# Patient Record
Sex: MALE | Race: WHITE | NOT HISPANIC OR LATINO | Employment: OTHER | URBAN - METROPOLITAN AREA
[De-identification: names, ages, dates, MRNs, and addresses within clinical notes are randomized per-mention and may not be internally consistent; named-entity substitution may affect disease eponyms.]

---

## 2018-02-14 ENCOUNTER — HOSPITAL ENCOUNTER (INPATIENT)
Facility: MEDICAL CENTER | Age: 58
LOS: 3 days | DRG: 175 | End: 2018-02-17
Attending: EMERGENCY MEDICINE | Admitting: INTERNAL MEDICINE
Payer: MEDICARE

## 2018-02-14 ENCOUNTER — RESOLUTE PROFESSIONAL BILLING HOSPITAL PROF FEE (OUTPATIENT)
Dept: HOSPITALIST | Facility: MEDICAL CENTER | Age: 58
End: 2018-02-14
Payer: MEDICARE

## 2018-02-14 ENCOUNTER — HOSPITAL ENCOUNTER (OUTPATIENT)
Dept: RADIOLOGY | Facility: MEDICAL CENTER | Age: 58
End: 2018-02-14

## 2018-02-14 DIAGNOSIS — E11.65 TYPE 2 DIABETES MELLITUS WITH HYPERGLYCEMIA, WITHOUT LONG-TERM CURRENT USE OF INSULIN (HCC): ICD-10-CM

## 2018-02-14 DIAGNOSIS — R79.89 ELEVATED TROPONIN: ICD-10-CM

## 2018-02-14 DIAGNOSIS — I26.09 OTHER ACUTE PULMONARY EMBOLISM WITH ACUTE COR PULMONALE (HCC): ICD-10-CM

## 2018-02-14 PROBLEM — I26.99 BILATERAL PULMONARY EMBOLISM (HCC): Status: ACTIVE | Noted: 2018-02-14

## 2018-02-14 PROBLEM — J96.01 ACUTE RESPIRATORY FAILURE WITH HYPOXIA (HCC): Status: ACTIVE | Noted: 2018-02-14

## 2018-02-14 LAB
ALBUMIN SERPL BCP-MCNC: 3.6 G/DL (ref 3.2–4.9)
ALBUMIN/GLOB SERPL: 1.2 G/DL
ALP SERPL-CCNC: 75 U/L (ref 30–99)
ALT SERPL-CCNC: 62 U/L (ref 2–50)
ANION GAP SERPL CALC-SCNC: 11 MMOL/L (ref 0–11.9)
APTT PPP: 53.9 SEC (ref 24.7–36)
AST SERPL-CCNC: 59 U/L (ref 12–45)
BASOPHILS # BLD AUTO: 0 % (ref 0–1.8)
BASOPHILS # BLD: 0 K/UL (ref 0–0.12)
BILIRUB SERPL-MCNC: 0.6 MG/DL (ref 0.1–1.5)
BNP SERPL-MCNC: 327 PG/ML (ref 0–100)
BUN SERPL-MCNC: 15 MG/DL (ref 8–22)
CALCIUM SERPL-MCNC: 8.4 MG/DL (ref 8.5–10.5)
CHLORIDE SERPL-SCNC: 107 MMOL/L (ref 96–112)
CO2 SERPL-SCNC: 19 MMOL/L (ref 20–33)
CREAT SERPL-MCNC: 0.74 MG/DL (ref 0.5–1.4)
EKG IMPRESSION: NORMAL
EOSINOPHIL # BLD AUTO: 0 K/UL (ref 0–0.51)
EOSINOPHIL NFR BLD: 0 % (ref 0–6.9)
ERYTHROCYTE [DISTWIDTH] IN BLOOD BY AUTOMATED COUNT: 43.5 FL (ref 35.9–50)
EST. AVERAGE GLUCOSE BLD GHB EST-MCNC: 309 MG/DL
FLUAV RNA SPEC QL NAA+PROBE: NEGATIVE
FLUBV RNA SPEC QL NAA+PROBE: NEGATIVE
GLOBULIN SER CALC-MCNC: 2.9 G/DL (ref 1.9–3.5)
GLUCOSE BLD-MCNC: 205 MG/DL (ref 65–99)
GLUCOSE SERPL-MCNC: 284 MG/DL (ref 65–99)
HBA1C MFR BLD: 12.4 % (ref 0–5.6)
HCT VFR BLD AUTO: 42.6 % (ref 42–52)
HGB BLD-MCNC: 14.2 G/DL (ref 14–18)
INR PPP: 1.22 (ref 0.87–1.13)
LIPASE SERPL-CCNC: 99 U/L (ref 11–82)
LYMPHOCYTES # BLD AUTO: 2.5 K/UL (ref 1–4.8)
LYMPHOCYTES NFR BLD: 24.8 % (ref 22–41)
MANUAL DIFF BLD: NORMAL
MCH RBC QN AUTO: 30.3 PG (ref 27–33)
MCHC RBC AUTO-ENTMCNC: 33.3 G/DL (ref 33.7–35.3)
MCV RBC AUTO: 90.8 FL (ref 81.4–97.8)
MONOCYTES # BLD AUTO: 0.44 K/UL (ref 0–0.85)
MONOCYTES NFR BLD AUTO: 4.4 % (ref 0–13.4)
MORPHOLOGY BLD-IMP: NORMAL
NEUTROPHILS # BLD AUTO: 7.15 K/UL (ref 1.82–7.42)
NEUTROPHILS NFR BLD: 69.9 % (ref 44–72)
NEUTS BAND NFR BLD MANUAL: 0.9 % (ref 0–10)
NRBC # BLD AUTO: 0 K/UL
NRBC BLD-RTO: 0 /100 WBC
PLATELET # BLD AUTO: 142 K/UL (ref 164–446)
PLATELET BLD QL SMEAR: NORMAL
PMV BLD AUTO: 9.9 FL (ref 9–12.9)
POTASSIUM SERPL-SCNC: 4.5 MMOL/L (ref 3.6–5.5)
PROT SERPL-MCNC: 6.5 G/DL (ref 6–8.2)
PROTHROMBIN TIME: 15.1 SEC (ref 12–14.6)
RBC # BLD AUTO: 4.69 M/UL (ref 4.7–6.1)
RBC BLD AUTO: PRESENT
SODIUM SERPL-SCNC: 137 MMOL/L (ref 135–145)
TROPONIN I SERPL-MCNC: 0.41 NG/ML (ref 0–0.04)
VARIANT LYMPHS BLD QL SMEAR: NORMAL
WBC # BLD AUTO: 10.1 K/UL (ref 4.8–10.8)

## 2018-02-14 PROCEDURE — 93005 ELECTROCARDIOGRAM TRACING: CPT | Performed by: EMERGENCY MEDICINE

## 2018-02-14 PROCEDURE — 83690 ASSAY OF LIPASE: CPT

## 2018-02-14 PROCEDURE — 83880 ASSAY OF NATRIURETIC PEPTIDE: CPT

## 2018-02-14 PROCEDURE — 83036 HEMOGLOBIN GLYCOSYLATED A1C: CPT

## 2018-02-14 PROCEDURE — 85610 PROTHROMBIN TIME: CPT

## 2018-02-14 PROCEDURE — 85007 BL SMEAR W/DIFF WBC COUNT: CPT

## 2018-02-14 PROCEDURE — 96365 THER/PROPH/DIAG IV INF INIT: CPT

## 2018-02-14 PROCEDURE — 82962 GLUCOSE BLOOD TEST: CPT

## 2018-02-14 PROCEDURE — 96375 TX/PRO/DX INJ NEW DRUG ADDON: CPT

## 2018-02-14 PROCEDURE — 84484 ASSAY OF TROPONIN QUANT: CPT

## 2018-02-14 PROCEDURE — 99291 CRITICAL CARE FIRST HOUR: CPT

## 2018-02-14 PROCEDURE — 37212 THROMBOLYTIC VENOUS THERAPY: CPT

## 2018-02-14 PROCEDURE — 770022 HCHG ROOM/CARE - ICU (200)

## 2018-02-14 PROCEDURE — 302128 INFUSION PUMP: Performed by: EMERGENCY MEDICINE

## 2018-02-14 PROCEDURE — 87502 INFLUENZA DNA AMP PROBE: CPT

## 2018-02-14 PROCEDURE — 93971 EXTREMITY STUDY: CPT

## 2018-02-14 PROCEDURE — 85730 THROMBOPLASTIN TIME PARTIAL: CPT

## 2018-02-14 PROCEDURE — 99223 1ST HOSP IP/OBS HIGH 75: CPT | Mod: AI | Performed by: INTERNAL MEDICINE

## 2018-02-14 PROCEDURE — 80053 COMPREHEN METABOLIC PANEL: CPT

## 2018-02-14 PROCEDURE — 700111 HCHG RX REV CODE 636 W/ 250 OVERRIDE (IP): Mod: JG | Performed by: INTERNAL MEDICINE

## 2018-02-14 PROCEDURE — 700102 HCHG RX REV CODE 250 W/ 637 OVERRIDE(OP): Performed by: INTERNAL MEDICINE

## 2018-02-14 PROCEDURE — 85027 COMPLETE CBC AUTOMATED: CPT

## 2018-02-14 RX ORDER — DEXTROSE MONOHYDRATE 25 G/50ML
25 INJECTION, SOLUTION INTRAVENOUS
Status: DISCONTINUED | OUTPATIENT
Start: 2018-02-14 | End: 2018-02-17 | Stop reason: HOSPADM

## 2018-02-14 RX ORDER — ONDANSETRON 2 MG/ML
4 INJECTION INTRAMUSCULAR; INTRAVENOUS EVERY 4 HOURS PRN
Status: DISCONTINUED | OUTPATIENT
Start: 2018-02-14 | End: 2018-02-17 | Stop reason: HOSPADM

## 2018-02-14 RX ORDER — ONDANSETRON 4 MG/1
4 TABLET, ORALLY DISINTEGRATING ORAL EVERY 4 HOURS PRN
Status: DISCONTINUED | OUTPATIENT
Start: 2018-02-14 | End: 2018-02-17 | Stop reason: HOSPADM

## 2018-02-14 RX ORDER — AMOXICILLIN 250 MG
2 CAPSULE ORAL 2 TIMES DAILY
Status: DISCONTINUED | OUTPATIENT
Start: 2018-02-14 | End: 2018-02-17 | Stop reason: HOSPADM

## 2018-02-14 RX ORDER — HEPARIN SODIUM 1000 [USP'U]/ML
3200 INJECTION, SOLUTION INTRAVENOUS; SUBCUTANEOUS PRN
Status: DISCONTINUED | OUTPATIENT
Start: 2018-02-14 | End: 2018-02-14 | Stop reason: ALTCHOICE

## 2018-02-14 RX ORDER — PROMETHAZINE HYDROCHLORIDE 25 MG/1
12.5-25 SUPPOSITORY RECTAL EVERY 4 HOURS PRN
Status: DISCONTINUED | OUTPATIENT
Start: 2018-02-14 | End: 2018-02-17 | Stop reason: HOSPADM

## 2018-02-14 RX ORDER — BISACODYL 10 MG
10 SUPPOSITORY, RECTAL RECTAL
Status: DISCONTINUED | OUTPATIENT
Start: 2018-02-14 | End: 2018-02-17 | Stop reason: HOSPADM

## 2018-02-14 RX ORDER — POLYETHYLENE GLYCOL 3350 17 G/17G
1 POWDER, FOR SOLUTION ORAL
Status: DISCONTINUED | OUTPATIENT
Start: 2018-02-14 | End: 2018-02-17 | Stop reason: HOSPADM

## 2018-02-14 RX ORDER — SODIUM CHLORIDE 9 MG/ML
1000 INJECTION, SOLUTION INTRAVENOUS ONCE
Status: ACTIVE | OUTPATIENT
Start: 2018-02-14 | End: 2018-02-15

## 2018-02-14 RX ORDER — LABETALOL HYDROCHLORIDE 5 MG/ML
10 INJECTION, SOLUTION INTRAVENOUS EVERY 4 HOURS PRN
Status: DISCONTINUED | OUTPATIENT
Start: 2018-02-14 | End: 2018-02-15

## 2018-02-14 RX ORDER — ENALAPRILAT 1.25 MG/ML
1.25 INJECTION INTRAVENOUS EVERY 6 HOURS PRN
Status: DISCONTINUED | OUTPATIENT
Start: 2018-02-14 | End: 2018-02-15

## 2018-02-14 RX ORDER — SODIUM CHLORIDE 9 MG/ML
INJECTION, SOLUTION INTRAVENOUS ONCE
Status: ACTIVE | OUTPATIENT
Start: 2018-02-14 | End: 2018-02-15

## 2018-02-14 RX ORDER — PROMETHAZINE HYDROCHLORIDE 25 MG/1
12.5-25 TABLET ORAL EVERY 4 HOURS PRN
Status: DISCONTINUED | OUTPATIENT
Start: 2018-02-14 | End: 2018-02-17 | Stop reason: HOSPADM

## 2018-02-14 RX ORDER — ACETAMINOPHEN 325 MG/1
650 TABLET ORAL EVERY 6 HOURS PRN
Status: DISCONTINUED | OUTPATIENT
Start: 2018-02-14 | End: 2018-02-17 | Stop reason: HOSPADM

## 2018-02-14 RX ORDER — HEPARIN SODIUM 1000 [USP'U]/ML
4000 INJECTION, SOLUTION INTRAVENOUS; SUBCUTANEOUS PRN
Status: DISCONTINUED | OUTPATIENT
Start: 2018-02-14 | End: 2018-02-17 | Stop reason: HOSPADM

## 2018-02-14 RX ADMIN — INSULIN HUMAN 2 UNITS: 100 INJECTION, SOLUTION PARENTERAL at 20:53

## 2018-02-14 RX ADMIN — ALTEPLASE 10 MG: KIT at 16:41

## 2018-02-14 RX ADMIN — HEPARIN SODIUM 25000 UNITS: 5000 INJECTION, SOLUTION INTRAVENOUS at 19:07

## 2018-02-14 RX ADMIN — HEPARIN SODIUM 4000 UNITS: 1000 INJECTION, SOLUTION INTRAVENOUS; SUBCUTANEOUS at 19:07

## 2018-02-14 RX ADMIN — ALTEPLASE 40 MG: KIT at 16:42

## 2018-02-14 ASSESSMENT — LIFESTYLE VARIABLES
EVER HAD A DRINK FIRST THING IN THE MORNING TO STEADY YOUR NERVES TO GET RID OF A HANGOVER: NO
HAVE YOU EVER FELT YOU SHOULD CUT DOWN ON YOUR DRINKING: NO
EVER FELT BAD OR GUILTY ABOUT YOUR DRINKING: NO
TOTAL SCORE: 0
HOW MANY TIMES IN THE PAST YEAR HAVE YOU HAD 5 OR MORE DRINKS IN A DAY: 2
HAVE PEOPLE ANNOYED YOU BY CRITICIZING YOUR DRINKING: NO
EVER_SMOKED: NEVER
EVER_SMOKED: YES
ALCOHOL_USE: YES
TOTAL SCORE: 0
TOTAL SCORE: 0
CONSUMPTION TOTAL: POSITIVE
AVERAGE NUMBER OF DAYS PER WEEK YOU HAVE A DRINK CONTAINING ALCOHOL: 2
ON A TYPICAL DAY WHEN YOU DRINK ALCOHOL HOW MANY DRINKS DO YOU HAVE: 1

## 2018-02-14 ASSESSMENT — ENCOUNTER SYMPTOMS
CHILLS: 0
SHORTNESS OF BREATH: 1
FEVER: 0
LOSS OF CONSCIOUSNESS: 1

## 2018-02-14 ASSESSMENT — COPD QUESTIONNAIRES
DURING THE PAST 4 WEEKS HOW MUCH DID YOU FEEL SHORT OF BREATH: NONE/LITTLE OF THE TIME
DO YOU EVER COUGH UP ANY MUCUS OR PHLEGM?: NO/ONLY WITH OCCASIONAL COLDS OR INFECTIONS
HAVE YOU SMOKED AT LEAST 100 CIGARETTES IN YOUR ENTIRE LIFE: NO/DON'T KNOW
COPD SCREENING SCORE: 1

## 2018-02-14 ASSESSMENT — PATIENT HEALTH QUESTIONNAIRE - PHQ9
SUM OF ALL RESPONSES TO PHQ9 QUESTIONS 1 AND 2: 0
SUM OF ALL RESPONSES TO PHQ QUESTIONS 1-9: 0
1. LITTLE INTEREST OR PLEASURE IN DOING THINGS: NOT AT ALL
2. FEELING DOWN, DEPRESSED, IRRITABLE, OR HOPELESS: NOT AT ALL

## 2018-02-14 ASSESSMENT — PAIN SCALES - GENERAL
PAINLEVEL_OUTOF10: 0

## 2018-02-14 NOTE — ED TRIAGE NOTES
58 y/o male bib air ambulance from [facility] in [facility] for evaluation of bilateral pulmonary embolism found on CT today. Pt reports he was not feeling well on Sunday, he felt sob last night and awoke this morning feeling worse, he was heading to the hospital with his wife and had a syncopal episode, EMS was called to bring pt to the ED in [place]. Pt on a heparin drip upon arrival to ED.

## 2018-02-14 NOTE — CONSULTS
Pulmonary & Critical Care Consult Note    DATE OF CONSULTATION:  2/14/2018     REFERRING PHYSICIAN:  Som Gudino M.D     CONSULTANT:  Douglas Barth DO     REASON FOR CONSULTATION: Submassive pulmonary embolus, syncope     HISTORY OF PRESENT ILLNESS: Mr. Small is a 57-year-old male with a past medical history of diabetes, ischemic CVA 3.5 years ago, and heart failure (now resolved per patient)  Patient [facility] [place] for evaluation of bilateral PEs. The patient is noted pain swelling and redness to his left leg for approximately 2 weeks, and yesterday began having shortness of breath. This morning he awoke feeling more short of breath and presented to the hospital after a syncopal episode. A CT scan of the chest was performed which demonstrated bilateral pulmonary emboli without abnormal RV:LV. He was started on heparin infusion and transferred here for subspecialty care. In our emergency department the patient was found to have an elevated troponin at 0.41, elevated BNP at 327, PTT was noted at 53.9 (on heparin drip), he is requiring 3 L oxygen via nasal cannula and has been hemodynamically stable.    He denies any melena, hematochezia, hematuria, no recent surgeries, no ischemic CVA within the last 3 months, no known malignant intracranial neoplasm, no known structural intracranial cerebrovascular disease, and no recent closed head injury.    He denies any trauma, hormone replacement therapy, history of blood clots, recent travel, family history of venous thromboembolic disease or known malignancy.     PAST MEDICAL HISTORY:  Past Medical History:   Diagnosis Date   • CVA (cerebral vascular accident) (CMS-[facility])     left sided weakness   • DM II (diabetes mellitus, type II), controlled (CMS-[facility])    • TIA (transient ischemic attack)         PAST SURGICAL HISTORY:    Past Surgical History:   Procedure Laterality Date   • TONSILLECTOMY          ALLERGIES:  Patient has no  "known allergies.     MEDICATIONS PRIOR TO ADMISSION:  none  No current facility-administered medications on file prior to encounter.      No current outpatient prescriptions on file prior to encounter.       SOCIAL HISTORY:   Social History     Social History   • Marital status:      Spouse name: N/A   • Number of children: N/A   • Years of education: N/A     Occupational History   • Not on file.     Social History Main Topics   • Smoking status: Never Smoker   • Smokeless tobacco: Never Used   • Alcohol use Yes      Comment: occ   • Drug use: No   • Sexual activity: Not on file     Other Topics Concern   • Not on file     Social History Narrative   • No narrative on file       FAMILY HISTORY:  History reviewed. No pertinent family history.     REVIEW OF SYSTEMS:   Constitutional: No fever, no chills,  Respiratory: No cough, no hemoptysis, +dyspnea  Cardiovascular: No chest pain, no palpitations, no orthopnea, no PND, no lower extremity swelling  GI: No nausea, no vomiting, no abdominal pain, no diarrhea, no constipation, no hematochezia, no melena  : no dysuria, no frequency, no urgency  Endocrine: No polyuria, no polydipsia, no hair loss  Hematology: No easy bruising, no bleeding  Musculoskeletal: No joint swelling, no joint erythema, no arthralgia, no myalgias, +LE swelling  Neuro: No seizures, no numbness, no tingling sensation, no extremity weakness, no change in vision.  Psychiatry: no depression, no suicidal ideation     PHYSICAL EXAMINATION:  /82   Pulse (!) 108   Temp 36.4 °C (97.6 °F)   Resp (!) 22   Ht 1.778 m (5' 10\")   Wt 95.3 kg (210 lb)   SpO2 96%   BMI 30.13 kg/m²   GENERAL: Well-nourished, well-developed, age-appropriate appearing male, in minimal distress  HEENT: Normocephalic, atraumatic, PERRL, EOMI, external ears normal, external nose normal, nasal cannula in place  NECK: Supple, no JVD, trachea midline  PULM: Clear to auscultation bilaterally, no wheeze  CVS: Mild " tachycardia, regular rhythm  ABDOMEN: Soft, nontender, bowel sounds, no rebound, no guarding  EXTREMITIES: Left lower extremity erythematous, edematous, tender to palpation  SKIN: Warm,  and dry  NEURO: Alert and oriented ×4 left upper extremity weakness    LABORATORY DATA:    Lab Results   Component Value Date/Time    WBC 10.1 02/14/2018 12:45 PM    RBC 4.69 (L) 02/14/2018 12:45 PM    HEMOGLOBIN 14.2 02/14/2018 12:45 PM    HEMATOCRIT 42.6 02/14/2018 12:45 PM    MCV 90.8 02/14/2018 12:45 PM    MCH 30.3 02/14/2018 12:45 PM    MCHC 33.3 (L) 02/14/2018 12:45 PM    MPV 9.9 02/14/2018 12:45 PM    NEUTSPOLYS 69.90 02/14/2018 12:45 PM    LYMPHOCYTES 24.80 02/14/2018 12:45 PM    MONOCYTES 4.40 02/14/2018 12:45 PM    EOSINOPHILS 0.00 02/14/2018 12:45 PM    BASOPHILS 0.00 02/14/2018 12:45 PM      Lab Results   Component Value Date/Time    SODIUM 137 02/14/2018 12:45 PM    POTASSIUM 4.5 02/14/2018 12:45 PM    CHLORIDE 107 02/14/2018 12:45 PM    CO2 19 (L) 02/14/2018 12:45 PM    GLUCOSE 284 (H) 02/14/2018 12:45 PM    BUN 15 02/14/2018 12:45 PM    CREATININE 0.74 02/14/2018 12:45 PM      Lab Results   Component Value Date/Time    PROTHROMBTM 15.1 (H) 02/14/2018 12:45 PM    INR 1.22 (H) 02/14/2018 12:45 PM         IMAGING:   CXR (personally reviewed)  LE VENOUS DUPLEX (Specify in Comments Left, Right Or Bilateral)         OUTSIDE IMAGES-CT CHEST   Final Result      ECHOCARDIOGRAM COMP W/O CONT    (Results Pending)     ASSESSMENT/RECS:  Submassive pulmonary embolus   - With evidence of myocardial necrosis: Troponin 0.41, . RV:LV ~1   - TPA offered to the patient and wife: Risks and benefits explained including 2% risk of cranial hemorrhage, 6% risk of hemorrhage requiring transfusion. Less long-term pulmonary hypertension, decreased recurrence of PE, decreased death and hemodynamic instability.   - They have elected to proceed with thrombolysis   - ICU orders have been placed   - neurochecks    Acute hypoxic respiratory  failure   - Secondary to pulmonary embolus   - RT/O2 Protocols   - Titrate supplemental FiO2 to maintain SpO2 >88%    Diabetes   - With hyperglycemia   - SSI, accuchecks    Hypertension   - Goal SBP <180 mmHg   - PRN's available    Prophylaxis: heparin    Patient is critically ill at this time.  I have spent 60 minutes examining this patient, all lab data, x-ray, and discussion with RN, RT, ED physician. Critical care time: 60 min. No time overlap. Procedures not included in time. Thank you for asking me to consult on the patient.  I appreciate the opportunity to assist in their care and will follow along closely with you.    Douglas Barth, DO  Critical Care Medicine    This dictation was created using voice recognition software. The accuracy of the dictation is limited to the abilities of the software. Errors of grammar and possibly content are to be expected.

## 2018-02-14 NOTE — ED NOTES
"Med rec updated and complete  Allergie reviewed  Pt states \"No prescription medications, OTC'S, or vitamins\".  Pt states \"No antibiotics in the last 30 days\".    "

## 2018-02-14 NOTE — ED PROVIDER NOTES
ED Provider Note    Scribed for Som Gudino M.D. by Afshan Azevedo. 2/14/2018, 12:14 PM.    Primary care provider: PCP, pt states none  Means of arrival: Med Flight  History obtained from: Patient  History limited by: None    CHIEF COMPLAINT  Chief Complaint   Patient presents with   • Pulmonary Embolism   • Syncope       HPI  Ryan Small is a 57 y.o. male who presents to the Emergency Department via Med Flight from [facility] [place], operated by [facility] in [facility] for evaluation of known bilateral PE, diagnosed via CT. Patient endorses shortness of breath, onset yesterday. His SOB worsened this morning with mild chest pain and a syncopal episode. 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 No complaints of fevers or chills. No new weakness to his extremities, however patient has felt slightly weaker in his left upper and lower extremities s/p CVA 2 years ago. Patient denies any fevers, chills, nausea, vomiting, is here for continued treatment and therapy.      REVIEW OF SYSTEMS  Review of Systems   Constitutional: Negative for chills and fever.   Respiratory: Positive for shortness of breath.    Cardiovascular: Positive for chest pain.   Musculoskeletal:        LE swelling   Neurological: Positive for loss of consciousness.        No new weakness   All other systems reviewed and are negative.  C.     PAST MEDICAL HISTORY   has a past medical history of CVA (cerebral vascular accident) (CMS-[facility]); DM II (diabetes mellitus, type II), controlled (CMS-[facility]); and TIA (transient ischemic attack).    SURGICAL HISTORY   has a past surgical history that includes tonsillectomy.    SOCIAL HISTORY  Social History   Substance Use Topics   • Smoking status: Never Smoker   • Smokeless tobacco: Never Used   • Alcohol use Yes      Comment: occ      History   Drug Use No   Works at a restaurant.     FAMILY HISTORY  History reviewed. No  pertinent family history.    CURRENT MEDICATIONS    Current Facility-Administered Medications:   •  NS infusion 1,000 mL, 1,000 mL, Intravenous, Once, Som Gudino M.D.  •  senna-docusate (PERICOLACE or SENOKOT S) 8.6-50 MG per tablet 2 Tab, 2 Tab, Oral, BID **AND** polyethylene glycol/lytes (MIRALAX) PACKET 1 Packet, 1 Packet, Oral, QDAY PRN **AND** magnesium hydroxide (MILK OF MAGNESIA) suspension 30 mL, 30 mL, Oral, QDAY PRN **AND** bisacodyl (DULCOLAX) suppository 10 mg, 10 mg, Rectal, QDAY PRN, Ki Chan M.D.  •  Respiratory Care per Protocol, , Nebulization, Continuous RT, Ki Chan M.D.  •  labetalol (NORMODYNE,TRANDATE) injection 10 mg, 10 mg, Intravenous, Q4HRS PRN, Ki Chan M.D.  •  enalaprilat (VASOTEC) injection 1.25 mg, 1.25 mg, Intravenous, Q6HRS PRN, Ki Chan M.D.  •  ondansetron (ZOFRAN) syringe/vial injection 4 mg, 4 mg, Intravenous, Q4HRS PRN, Ki Chan M.D.  •  ondansetron (ZOFRAN ODT) dispertab 4 mg, 4 mg, Oral, Q4HRS PRN, Ki Chan M.D.  •  promethazine (PHENERGAN) tablet 12.5-25 mg, 12.5-25 mg, Oral, Q4HRS PRN, Ki Chan M.D.  •  promethazine (PHENERGAN) suppository 12.5-25 mg, 12.5-25 mg, Rectal, Q4HRS PRN, Ki Chan M.D.  •  prochlorperazine (COMPAZINE) injection 5-10 mg, 5-10 mg, Intravenous, Q4HRS PRN, Ki Chan M.D.  •  acetaminophen (TYLENOL) tablet 650 mg, 650 mg, Oral, Q6HRS PRN, Ki Chan M.D.  •  insulin regular (HUMULIN R) injection 1-6 Units, 1-6 Units, Subcutaneous, 4X/DAY ACHS **AND** Accu-Chek ACHS, , , Q AC AND BEDTIME(S) **AND** NOTIFY MD and PharmD, , , Once **AND** glucose 4 g chewable tablet 16 g, 16 g, Oral, Q15 MIN PRN **AND** dextrose 50% (D50W) injection 25 mL, 25 mL, Intravenous, Q15 MIN PRN, Ki Chan M.D.  •  [COMPLETED] alteplase (ACTIVASE) SOLR 10 mg, 10 mg, Intravenous, Once, 10 mg at 02/14/18 1641 **FOLLOWED BY** [COMPLETED] alteplase (ACTIVASE) SOLR 40 mg, 40 mg, Intravenous, Once, 40 mg at  02/14/18 1642 **FOLLOWED BY** NS infusion, , Intravenous, Once, Douglas Barth Jr. D.O.  •  heparin injection 4,000 Units, 4,000 Units, Intravenous, PRN, 4,000 Units at 02/14/18 1907 **AND** heparin infusion 25,000 units in 500 ml 0.45% nacl, , Intravenous, Continuous, Last Rate: 20 mL/hr at 02/14/18 1937, 1,000 Units/hr at 02/14/18 1937 **AND** Action is required: Protocol 209 Heparin Post Fibrinolytic has been implemented, , , Once **AND** Protocol 209 Heparin Post Fibrinolytic Discontinue Enoxaparin (Lovenox), Dabigatran (Pradaxa), Rivaroxaban (Xarelto), Apixaban (Eliquis), Edoxaban (Savaysa, Lixiana), and Fondaparinux (Arixtra) and Argatroban prior to heparin administration, , , CONTINUOUS **AND** APTT, , , Q6H(S) **AND** [START ON 2/15/2018] APTT, , , TOMORROW AM (0300) **AND** RN to place APTT Orders IF, , , CONTINUOUS **AND** Protocol 209 HEPARIN LOADING DOSE - 1000 UNITS/ML GOAL APPROXIMATELY 1000 UNITS/ML, , , CONTINUOUS **AND** Protocol 209 INITIAL HEPARIN INFUSION - 25,000 UNITS  ML OF 0.45% NaCl = 50 UNITS/ML GOAL APPROXIMATELY 12 UNITS/KG/HOUR, , , CONTINUOUS **AND** Protocol 209 HEPARIN ADJUSTMENT RELOAD OR HOLD / RATE CHANGE, , , CONTINUOUS, Douglas Barth Jr., D.O.      ALLERGIES  No Known Allergies    PHYSICAL EXAM  VITAL SIGNS: /82   Pulse (!) 108   Temp 36.4 °C (97.6 °F)   Resp (!) 22   Wt 95.3 kg (210 lb)   SpO2 96%     Constitutional: Well developed, Well nourished, No acute distress, Non-toxic appearance.   HENT: Normocephalic, Atraumatic, Bilateral external ears normal, oropharynx moist, No oral exudates, Nose normal.   Eyes: Pupils are equal round and react to light, extraocular motions are intact, conjunctiva is normal, there are no signs of exudate.   Neck: Supple, no meningeal signs.  Lymphatic: No lymphadenopathy noted.   Cardiovascular: Tachycardic, regular rhythm without murmurs gallops or rubs.   Thorax & Lungs: No respiratory distress. Breathing comfortably. Lungs  are clear to auscultation bilaterally, there are no wheezes no rales. Chest wall is nontender.  Abdomen: Soft, nontender, nondistended. Bowel sounds are present.   Skin: Warm, Dry, No erythema,   Back: No tenderness, No CVA tenderness.  Musculoskeletal: 2+ pitting edema to LLE, no edema to RLE. Tenderness to calf of LLE.   Neurologic: Alert & oriented x 3, Moving all extremities. 4/5 Strength to LUE, LLE. 5/5 strength to RUE, RLE.   Psychiatric: Affect normal, Judgment normal, Mood normal.       LABS  Results for orders placed or performed during the hospital encounter of 02/14/18   CBC with Differential   Result Value Ref Range    WBC 10.1 4.8 - 10.8 K/uL    RBC 4.69 (L) 4.70 - 6.10 M/uL    Hemoglobin 14.2 14.0 - 18.0 g/dL    Hematocrit 42.6 42.0 - 52.0 %    MCV 90.8 81.4 - 97.8 fL    MCH 30.3 27.0 - 33.0 pg    MCHC 33.3 (L) 33.7 - 35.3 g/dL    RDW 43.5 35.9 - 50.0 fL    Platelet Count 142 (L) 164 - 446 K/uL    MPV 9.9 9.0 - 12.9 fL    Neutrophils-Polys 69.90 44.00 - 72.00 %    Lymphocytes 24.80 22.00 - 41.00 %    Monocytes 4.40 0.00 - 13.40 %    Eosinophils 0.00 0.00 - 6.90 %    Basophils 0.00 0.00 - 1.80 %    Nucleated RBC 0.00 /100 WBC    Neutrophils (Absolute) 7.15 1.82 - 7.42 K/uL    Lymphs (Absolute) 2.50 1.00 - 4.80 K/uL    Monos (Absolute) 0.44 0.00 - 0.85 K/uL    Eos (Absolute) 0.00 0.00 - 0.51 K/uL    Baso (Absolute) 0.00 0.00 - 0.12 K/uL    NRBC (Absolute) 0.00 K/uL   Complete Metabolic Panel (CMP)   Result Value Ref Range    Sodium 137 135 - 145 mmol/L    Potassium 4.5 3.6 - 5.5 mmol/L    Chloride 107 96 - 112 mmol/L    Co2 19 (L) 20 - 33 mmol/L    Anion Gap 11.0 0.0 - 11.9    Glucose 284 (H) 65 - 99 mg/dL    Bun 15 8 - 22 mg/dL    Creatinine 0.74 0.50 - 1.40 mg/dL    Calcium 8.4 (L) 8.5 - 10.5 mg/dL    AST(SGOT) 59 (H) 12 - 45 U/L    ALT(SGPT) 62 (H) 2 - 50 U/L    Alkaline Phosphatase 75 30 - 99 U/L    Total Bilirubin 0.6 0.1 - 1.5 mg/dL    Albumin 3.6 3.2 - 4.9 g/dL    Total Protein 6.5 6.0 - 8.2 g/dL     Globulin 2.9 1.9 - 3.5 g/dL    A-G Ratio 1.2 g/dL   Btype Natriuretic Peptide (BNP)   Result Value Ref Range    B Natriuretic Peptide 327 (H) 0 - 100 pg/mL   Prothrombin Time (PT/INR)   Result Value Ref Range    PT 15.1 (H) 12.0 - 14.6 sec    INR 1.22 (H) 0.87 - 1.13   APTT   Result Value Ref Range    APTT 53.9 (H) 24.7 - 36.0 sec   Lipase   Result Value Ref Range    Lipase 99 (H) 11 - 82 U/L   Troponin STAT   Result Value Ref Range    Troponin I 0.41 (H) 0.00 - 0.04 ng/mL   ESTIMATED GFR   Result Value Ref Range    GFR If African American >60 >60 mL/min/1.73 m 2    GFR If Non African American >60 >60 mL/min/1.73 m 2   HEMOGLOBIN A1C   Result Value Ref Range    Glycohemoglobin 12.4 (H) 0.0 - 5.6 %    Est Avg Glucose 309 mg/dL   INFLUENZA A/B BY PCR   Result Value Ref Range    Influenza virus A RNA Negative Negative    Influenza virus B, PCR Negative Negative   DIFFERENTIAL MANUAL   Result Value Ref Range    Bands-Stabs 0.90 0.00 - 10.00 %    Manual Diff Status PERFORMED    PERIPHERAL SMEAR REVIEW   Result Value Ref Range    Peripheral Smear Review see below    PLATELET ESTIMATE   Result Value Ref Range    Plt Estimation Decreased    MORPHOLOGY   Result Value Ref Range    RBC Morphology Present     Reactive Lymphocytes Few    EKG (ER)   Result Value Ref Range    Report       [facility] Emergency Dept.    Test Date:  2018  Pt Name:    JUNAID NAVARRETE                  Department: ER  MRN:        [id number]                      Room:       RD 11  Gender:     Male                         Technician: [id number]  :        1960                   Requested By:ER TRIAGE PROTOCOL  Order #:    [id number]                    Reading MD: LEIGHTON YANES MD    Measurements  Intervals                                Axis  Rate:       102                          P:          62  MT:         228                          QRS:        70  QRSD:       112                          T:          -71  QT:          340  QTc:        443    Interpretive Statements  SINUS TACHYCARDIA  FIRST DEGREE AV BLOCK  NONSPECIFIC INTRAVENTRICULAR CONDUCTION DELAY  BORDERLINE INFERIOR Q WAVES  No previous ECG available for comparison    Electronically Signed On 2- 12:21:22 PST by LEIGHTON YANES MD     All labs reviewed by me.    EKG  Interpreted by me as above.      RADIOLOGY  LE VENOUS DUPLEX (Specify in Comments Left, Right Or Bilateral)   Final Result      OUTSIDE IMAGES-CT CHEST   Final Result      ECHOCARDIOGRAM COMP W/O CONT    (Results Pending)     The radiologist's interpretation of all radiological studies have been reviewed by me.    COURSE & MEDICAL DECISION MAKING  Pertinent Labs & Imaging studies reviewed. (See chart for details)    12:14 PM - Patient seen and examined at bedside. Patient will be treated with IV Fluids for elevated sugars and tachycardia and Lovenox 100 mg. Ordered LE Venous Duplex, CBC, CMP, BNP, Prothrombin Time, APTT, Lipase, Troponin STAT, EKG to evaluate his symptoms. Discussed plan to admit, patient understands and agrees to plan. The differential diagnoses include but are not limited to: Pulmonary as above. Signs of cor pulmonale    12:22 PM - Records review note from [facility]: Sugars elevated at 345, Creatinine normal, TROP 0.21, CT scan shows prominent bilateral pulmonary embolism. Discussed case with Pharmacist.    12:31 PM - I discussed the patient's case and the above findings with Dr. Chan, Hospitalist, who advised that I consult Pulmonology.     2:58 PM - Per Radiologist, US shows DVT in left leg.       Decision Making:  Patient presents for evaluation. The patient has known pulmonary emboli done on CT scan today. There are signs of heart involvement. Because of this, I did speak with pulmonary. At this point, they will value. The patient most likely start the patient on thrombolytics. At this point, the patient will be admitted for further treatment and care.    DISPOSITION:  Patient  will be admitted to Dr. Chan, Hospitalist, in guarded condition.      FINAL IMPRESSION  1. Other acute pulmonary embolism with acute cor pulmonale (CMS-[facility])    2. Elevated troponin    3. Type 2 diabetes mellitus with hyperglycemia, without long-term current use of insulin (CMS-[facility])          Afshan YOUSIF (Scribe), am scribing for, and in the presence of, Som Gudino M.D..    Electronically signed by: Afshan Azevedo ([place]), 2/14/2018    ISom M.D. personally performed the services described in this documentation, as scribed by Afshan Azevedo in my presence, and it is both accurate and complete.    The note accurately reflects work and decisions made by me.  Som Gudino  2/14/2018  8:03 PM

## 2018-02-15 PROBLEM — I42.9 CARDIOMYOPATHY (HCC): Status: ACTIVE | Noted: 2018-02-15

## 2018-02-15 PROBLEM — I82.4Y2 DVT, LOWER EXTREMITY, PROXIMAL, ACUTE, LEFT (HCC): Status: ACTIVE | Noted: 2018-02-15

## 2018-02-15 PROBLEM — E11.9 TYPE 2 DIABETES MELLITUS (HCC): Status: ACTIVE | Noted: 2018-02-15

## 2018-02-15 LAB
ALBUMIN SERPL BCP-MCNC: 3 G/DL (ref 3.2–4.9)
ALBUMIN/GLOB SERPL: 1.1 G/DL
ALP SERPL-CCNC: 64 U/L (ref 30–99)
ALT SERPL-CCNC: 44 U/L (ref 2–50)
ANION GAP SERPL CALC-SCNC: 7 MMOL/L (ref 0–11.9)
ANION GAP SERPL CALC-SCNC: 8 MMOL/L (ref 0–11.9)
APTT PPP: 41.1 SEC (ref 24.7–36)
APTT PPP: 50.2 SEC (ref 24.7–36)
APTT PPP: 52.9 SEC (ref 24.7–36)
APTT PPP: 61.5 SEC (ref 24.7–36)
AST SERPL-CCNC: 20 U/L (ref 12–45)
BASOPHILS # BLD AUTO: 0 % (ref 0–1.8)
BASOPHILS # BLD: 0 K/UL (ref 0–0.12)
BILIRUB SERPL-MCNC: 0.6 MG/DL (ref 0.1–1.5)
BUN SERPL-MCNC: 16 MG/DL (ref 8–22)
BUN SERPL-MCNC: 16 MG/DL (ref 8–22)
CALCIUM SERPL-MCNC: 8 MG/DL (ref 8.5–10.5)
CALCIUM SERPL-MCNC: 8.4 MG/DL (ref 8.5–10.5)
CHLORIDE SERPL-SCNC: 105 MMOL/L (ref 96–112)
CHLORIDE SERPL-SCNC: 107 MMOL/L (ref 96–112)
CO2 SERPL-SCNC: 19 MMOL/L (ref 20–33)
CO2 SERPL-SCNC: 22 MMOL/L (ref 20–33)
CREAT SERPL-MCNC: 0.64 MG/DL (ref 0.5–1.4)
CREAT SERPL-MCNC: 0.7 MG/DL (ref 0.5–1.4)
EOSINOPHIL # BLD AUTO: 0.09 K/UL (ref 0–0.51)
EOSINOPHIL NFR BLD: 0.9 % (ref 0–6.9)
ERYTHROCYTE [DISTWIDTH] IN BLOOD BY AUTOMATED COUNT: 43.1 FL (ref 35.9–50)
GLOBULIN SER CALC-MCNC: 2.7 G/DL (ref 1.9–3.5)
GLUCOSE BLD-MCNC: 182 MG/DL (ref 65–99)
GLUCOSE BLD-MCNC: 186 MG/DL (ref 65–99)
GLUCOSE BLD-MCNC: 217 MG/DL (ref 65–99)
GLUCOSE BLD-MCNC: 226 MG/DL (ref 65–99)
GLUCOSE SERPL-MCNC: 185 MG/DL (ref 65–99)
GLUCOSE SERPL-MCNC: 199 MG/DL (ref 65–99)
HCT VFR BLD AUTO: 36.2 % (ref 42–52)
HGB BLD-MCNC: 12.2 G/DL (ref 14–18)
LV EJECT FRACT  99904: 20
LV EJECT FRACT MOD 2C 99903: 28.31
LV EJECT FRACT MOD 4C 99902: 24.57
LV EJECT FRACT MOD BP 99901: 27.38
LYMPHOCYTES # BLD AUTO: 2.17 K/UL (ref 1–4.8)
LYMPHOCYTES NFR BLD: 22.8 % (ref 22–41)
MANUAL DIFF BLD: NORMAL
MCH RBC QN AUTO: 30.3 PG (ref 27–33)
MCHC RBC AUTO-ENTMCNC: 33.7 G/DL (ref 33.7–35.3)
MCV RBC AUTO: 90 FL (ref 81.4–97.8)
MONOCYTES # BLD AUTO: 0.33 K/UL (ref 0–0.85)
MONOCYTES NFR BLD AUTO: 3.5 % (ref 0–13.4)
MORPHOLOGY BLD-IMP: NORMAL
NEUTROPHILS # BLD AUTO: 6.92 K/UL (ref 1.82–7.42)
NEUTROPHILS NFR BLD: 72.8 % (ref 44–72)
NRBC # BLD AUTO: 0 K/UL
NRBC BLD-RTO: 0 /100 WBC
PLATELET # BLD AUTO: 140 K/UL (ref 164–446)
PLATELET BLD QL SMEAR: NORMAL
PMV BLD AUTO: 10.6 FL (ref 9–12.9)
POTASSIUM SERPL-SCNC: 4.3 MMOL/L (ref 3.6–5.5)
POTASSIUM SERPL-SCNC: 5.6 MMOL/L (ref 3.6–5.5)
PROT SERPL-MCNC: 5.7 G/DL (ref 6–8.2)
RBC # BLD AUTO: 4.02 M/UL (ref 4.7–6.1)
RBC BLD AUTO: NORMAL
SODIUM SERPL-SCNC: 134 MMOL/L (ref 135–145)
SODIUM SERPL-SCNC: 134 MMOL/L (ref 135–145)
TROPONIN I SERPL-MCNC: 0.24 NG/ML (ref 0–0.04)
WBC # BLD AUTO: 9.5 K/UL (ref 4.8–10.8)

## 2018-02-15 PROCEDURE — 770022 HCHG ROOM/CARE - ICU (200)

## 2018-02-15 PROCEDURE — 93306 TTE W/DOPPLER COMPLETE: CPT | Mod: 26 | Performed by: INTERNAL MEDICINE

## 2018-02-15 PROCEDURE — 700111 HCHG RX REV CODE 636 W/ 250 OVERRIDE (IP): Performed by: INTERNAL MEDICINE

## 2018-02-15 PROCEDURE — 700102 HCHG RX REV CODE 250 W/ 637 OVERRIDE(OP): Performed by: HOSPITALIST

## 2018-02-15 PROCEDURE — A9270 NON-COVERED ITEM OR SERVICE: HCPCS | Performed by: INTERNAL MEDICINE

## 2018-02-15 PROCEDURE — 80053 COMPREHEN METABOLIC PANEL: CPT

## 2018-02-15 PROCEDURE — 85730 THROMBOPLASTIN TIME PARTIAL: CPT

## 2018-02-15 PROCEDURE — 700102 HCHG RX REV CODE 250 W/ 637 OVERRIDE(OP): Performed by: INTERNAL MEDICINE

## 2018-02-15 PROCEDURE — 85027 COMPLETE CBC AUTOMATED: CPT

## 2018-02-15 PROCEDURE — 84484 ASSAY OF TROPONIN QUANT: CPT

## 2018-02-15 PROCEDURE — 82962 GLUCOSE BLOOD TEST: CPT | Mod: 91

## 2018-02-15 PROCEDURE — A9270 NON-COVERED ITEM OR SERVICE: HCPCS | Performed by: HOSPITALIST

## 2018-02-15 PROCEDURE — 99233 SBSQ HOSP IP/OBS HIGH 50: CPT | Performed by: HOSPITALIST

## 2018-02-15 PROCEDURE — 93306 TTE W/DOPPLER COMPLETE: CPT

## 2018-02-15 PROCEDURE — 85007 BL SMEAR W/DIFF WBC COUNT: CPT

## 2018-02-15 PROCEDURE — 80048 BASIC METABOLIC PNL TOTAL CA: CPT

## 2018-02-15 RX ORDER — GLYBURIDE 5 MG/1
5 TABLET ORAL 2 TIMES DAILY WITH MEALS
Status: DISCONTINUED | OUTPATIENT
Start: 2018-02-15 | End: 2018-02-17 | Stop reason: HOSPADM

## 2018-02-15 RX ORDER — SPIRONOLACTONE 25 MG/1
12.5 TABLET ORAL
Status: DISCONTINUED | OUTPATIENT
Start: 2018-02-15 | End: 2018-02-17

## 2018-02-15 RX ORDER — FUROSEMIDE 20 MG/1
20 TABLET ORAL
Status: DISCONTINUED | OUTPATIENT
Start: 2018-02-15 | End: 2018-02-17 | Stop reason: HOSPADM

## 2018-02-15 RX ORDER — METOPROLOL SUCCINATE 25 MG/1
50 TABLET, EXTENDED RELEASE ORAL EVERY EVENING
Status: DISCONTINUED | OUTPATIENT
Start: 2018-02-15 | End: 2018-02-16

## 2018-02-15 RX ORDER — LISINOPRIL 5 MG/1
5 TABLET ORAL
Status: DISCONTINUED | OUTPATIENT
Start: 2018-02-15 | End: 2018-02-17 | Stop reason: HOSPADM

## 2018-02-15 RX ADMIN — INSULIN HUMAN 1 UNITS: 100 INJECTION, SOLUTION PARENTERAL at 17:18

## 2018-02-15 RX ADMIN — ACETAMINOPHEN 650 MG: 325 TABLET, FILM COATED ORAL at 23:30

## 2018-02-15 RX ADMIN — HEPARIN SODIUM 4000 UNITS: 1000 INJECTION, SOLUTION INTRAVENOUS; SUBCUTANEOUS at 09:06

## 2018-02-15 RX ADMIN — HEPARIN SODIUM 1100 UNITS/HR: 5000 INJECTION, SOLUTION INTRAVENOUS at 21:10

## 2018-02-15 RX ADMIN — METOPROLOL SUCCINATE 50 MG: 25 TABLET, EXTENDED RELEASE ORAL at 21:07

## 2018-02-15 RX ADMIN — INSULIN HUMAN 2 UNITS: 100 INJECTION, SOLUTION PARENTERAL at 21:10

## 2018-02-15 RX ADMIN — LISINOPRIL 5 MG: 5 TABLET ORAL at 17:44

## 2018-02-15 RX ADMIN — SPIRONOLACTONE 12.5 MG: 25 TABLET, FILM COATED ORAL at 17:44

## 2018-02-15 RX ADMIN — GLYBURIDE 5 MG: 5 TABLET ORAL at 17:18

## 2018-02-15 RX ADMIN — INSULIN HUMAN 2 UNITS: 100 INJECTION, SOLUTION PARENTERAL at 11:30

## 2018-02-15 RX ADMIN — HEPARIN SODIUM 4000 UNITS: 1000 INJECTION, SOLUTION INTRAVENOUS; SUBCUTANEOUS at 02:18

## 2018-02-15 RX ADMIN — INSULIN HUMAN 1 UNITS: 100 INJECTION, SOLUTION PARENTERAL at 08:19

## 2018-02-15 RX ADMIN — HEPARIN SODIUM 4000 UNITS: 1000 INJECTION, SOLUTION INTRAVENOUS; SUBCUTANEOUS at 23:22

## 2018-02-15 RX ADMIN — FUROSEMIDE 20 MG: 20 TABLET ORAL at 17:44

## 2018-02-15 ASSESSMENT — PAIN SCALES - GENERAL
PAINLEVEL_OUTOF10: 0
PAINLEVEL_OUTOF10: 6
PAINLEVEL_OUTOF10: 0

## 2018-02-15 ASSESSMENT — ENCOUNTER SYMPTOMS
FEVER: 0
NAUSEA: 0
SHORTNESS OF BREATH: 0
CHILLS: 0
COUGH: 0

## 2018-02-15 NOTE — ASSESSMENT & PLAN NOTE
This is his second DVT with the first being in 2002  Ultrasound:  Left lower extremity -    Echolucent material is visualized extending from the distal external iliac    vein into the common femoral, profunda femoral, superficial femoral, and    the  popliteal veins that has the appearance of acute venous thrombosis.    The peroneal and posterior tibial veins appear to be partially compressible    with limited flow response to augmentation demonstrated.

## 2018-02-15 NOTE — ASSESSMENT & PLAN NOTE
Cardiology consult.  Unclear if ischemic or non-ischemic thus stress test ordered and +/- heart cath afterwards thus continue IV heparin which can be turned off.  Echo:  CONCLUSIONS  No prior study is available for comparison.   Severely reduced left ventricular systolic function.  Left ventricular ejection fraction is visually estimated to be 20%.  Diastolic function is difficult to asses.   Mild mitral regurgitation.  Unable to estimate pulmonary artery pressure due to an inadequate   tricuspid regurgitant jet

## 2018-02-15 NOTE — H&P
CHIEF COMPLAINT:  Shortness of breath, lower extremity swelling, collapse.    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    He was doing well until Sunday morning when he started to feel sick and   unwell, and prompting him to stay in bed the whole day.  He also shared that   he has noticed left lower extremity swelling about 1 week ago, which   persisted.    He felt okay on Monday and Tuesday, and went back to work.  However, this   morning, he started to feel short of breath even at rest, and even worse with   movement.  He denied any chest pain.  His wife recommended for him to go to   the hospital, but as he was walking to the car patient collapsed, although   patient denied loss of consciousness or syncope.  He stated that he just could   not walk, and had no strength in his legs.  EMS was then called, who then   brought him to [facility], where a CT scan was obtained, which   showed bilateral submassive PE with right heart strain.  He was then   transferred to the [facility] for further evaluation and   management.    Additionally, he denied any fevers, chills, nausea or vomiting, abdominal   pain, diarrhea, or any other symptoms except those above.    EMERGENCY DEPARTMENT COURSE:  The patient was initially evaluated in the   emergency department, was maintaining good hemodynamics, although tachycardic,   and requiring 3 liters nasal cannula to keep saturations up.  He was   afebrile.  Initial blood workup showed unremarkable CBC, and unremarkable BMP   except for hyperglycemia.  Initial troponin was 0.21, which increased to 0.41.    BNP was 327.  INR was 1.22.  CT angiogram of the chest from [place] was   reviewed which showed bilateral submassive PE, with right heart strain.     Patient was just started on IV Lovenox and was subsequently admitted to the   hospitalist service.    REVIEW OF SYSTEMS  A complete review of system was done. All other systems were negative.    PMH/PSH/FMH: I personally reviewed all ancillary histories as noted.    PAST MEDICAL HISTORY:  Past Medical History:   Diagnosis Date   • CVA (cerebral vascular accident) (CMS-formerly [facility])     left sided weakness   • DM II (diabetes mellitus, type II), controlled (CMS-HCC)    • TIA (transient ischemic attack)        PAST SURGICAL HISTORY:  Past Surgical History:   Procedure Laterality Date   • TONSILLECTOMY         PERSONAL/SOCIAL HISTORY:  Social History   Substance Use Topics   • Smoking status: Never Smoker   • Smokeless tobacco: Never Used   • Alcohol use Yes      Comment: occ       FAMILY MEDICAL HISTORY:  History reviewed. No pertinent family history.    ALLERGIES:  Patient has no known allergies.    HOME MEDICATIONS:  Prior to Admission medications    Not on File           PHYSICAL EXAMINATION:  VITAL SIGNS:  Blood pressure 114/82, heart rate 108, respiratory rate 22,   oxygen saturation 96% on 3 liters nasal cannula, temperature 36.4 degrees   Celsius.  CONSTITUTIONAL:  Weak looking, not in cardiorespiratory distress.  HENT: Normocephalic, atraumatic, (-) tonsillopharyngal congestion or exudate.  EYES: PERRLA, pink conjuctivae, (-) icteric sclerae  NECK: (-) cervical lymphadenopathy, (-) neck rigidity  RESPIRATORY:  Diminished air entry, bilateral lung bases, otherwise clear to   auscultation bilaterally.  CARDIOVASCULAR:  Tachycardic, regular rhythm.  No murmurs, rubs, or gallops.    2+ left lower extremity edema, with calf tenderness.  GASTROINTESTINAL: normoactive bowel sounds, soft, (-) tenderness, (-) masses, (-) guarding/rebound  MUSCULOSKELETAL:  No joint tenderness or swelling.  (+) 2+ left lower extremity   edema with positive Sebastian sign. (-) gross limitation of movement of 4 extremities  SKIN: (-) erythema,  warmth, rashes, ulcers, open wounds  PSYCHIATRIC: mood, affect, and thought content WNL, behavior age appropriate  NEUROLOGIC: Non-focal, moves all 4 extremities, sensory grossly intact      PERTINENT DIAGNOSTIC RESULTS:  Reviewed, and as mentioned above. Please refer to ED course.      ASSESSMENT:  1.  Acute respiratory failure with hypoxia secondary to bilateral submassive   pulmonary embolism.  2.  Bilateral submassive pulmonary embolism with right heart strain.  3.  Left lower extremity edema, suspect deep vein thrombosis.  4.  Elevated troponin, likely demand ischemia from pulmonary embolism and   right heart strain.  5.  History of left lower extremity deep vein thrombosis.  6.  History of cerebrovascular accident with residual left-sided weakness.  7.  Type 2 diabetes mellitus.    PLAN:  ---  I will admit him to the telemetry unit for now.  I anticipate that he will   need at least 2 midnights hospital stay to provide medically necessary   services.  I will continue him on anticoagulation with therapeutic dose   Lovenox.  Given his right heart strain, and elevated troponin, I asked the ER   physician to call pulmonology for consideration of thrombolysis.  If he does   qualify for thrombolysis, he will need to be sent to the ICU following that.    I will obtain an echocardiogram to further evaluate his right heart strain,   and we will do serial troponins.  We will continue to monitor him on   telemetry.  I will continue him on respiratory support with RT protocol, and   oxygen supplementation to keep saturations above 88%.  ---  I suspect that he will need lifelong anticoagulation as this is already   his second episode of venous thromboembolism.  I will defer starting him on   oral anticoagulants until pulmonology inputs regarding thrombolysis.  ---  His leg duplex ultrasound is pending, but I highly suspect thrombosis.    Continue anticoagulation as above.  ---  The rest of his home medications needs to be  reconciled.  I will start him   on sliding scale insulin while he is in house, I will send for hemoglobin   A1c, and we will do Accu-Cheks before meals and at bedtime.  I will put him on   diabetic diet.      Deep venous thrombosis prophylaxis - Lovenox subcutaneously daily.  Gastrointestinal prophylaxis - not indicated.  Code status - full code.       ____________________________________     IVELISSE Carballo / DAX    DD:  02/14/2018 17:21:24  DT:  02/14/2018 18:25:14    D#:  [id number]  Job#:  [id number]

## 2018-02-15 NOTE — PROGRESS NOTES
[facility] Hospitalist Progress Note    Date of Service: 2/15/2018    Chief Complaint  57 y.o. male admitted 2018 with syncope.    Interval Problem Update  Mr. Small has a history of DM and CVA that had been experiencing chest pain and shortness of breath for a day prior to a syncopal episode. He had also been experiencing swelling of the left leg for 2 weeks. He presented to the ER in [place] and was found to have bilaterally PEs thus was transferred to [facility] where he was given alteplase therapy and admitted to the ICU.  He notes bruising of his tongue. He is on 2 liters nasal cannula oxygen. We had a long discussion about anticoagulation and diabetes control.   Consultants/Specialty  Critical Care. I discussed his condition with Dr. Gonda on ICU Hot Rounds.  Cardiology  Disposition  ICU        Review of Systems   Constitutional: Negative for chills and fever.   Respiratory: Negative for cough and shortness of breath.    Cardiovascular: Negative for chest pain.   Gastrointestinal: Negative for nausea.   All other systems reviewed and are negative.     Physical Exam  Laboratory/Imaging   Hemodynamics  Temp (24hrs), Av.4 °C (97.6 °F), Min:36.4 °C (97.5 °F), Max:36.5 °C (97.7 °F)   Temperature: 36.5 °C (97.7 °F)  Pulse  Av.2  Min: 74  Max: 109 Heart Rate (Monitored): 75  Blood Pressure: 114/82, NIBP: 129/81      Respiratory      Respiration: (!) 10, Pulse Oximetry: 99 %, O2 Daily Delivery Respiratory : Silicone Nasal Cannula     Work Of Breathing / Effort: Moderate  RUL Breath Sounds: Clear;Diminished, RML Breath Sounds: Clear;Diminished, RLL Breath Sounds: Diminished, LATASHA Breath Sounds: Clear;Diminished, LLL Breath Sounds: Diminished    Fluids    Intake/Output Summary (Last 24 hours) at 02/15/18 0654  Last data filed at 02/15/18 0600   Gross per 24 hour   Intake           429.39 ml   Output              250 ml   Net           179.39 ml       Nutrition  Orders Placed This Encounter   Procedures   • Diet  Order     Standing Status:   Standing     Number of Occurrences:   1     Order Specific Question:   Diet:     Answer:   Diabetic [3]     Physical Exam   Constitutional: He is oriented to person, place, and time. No distress.   Neck: Neck supple.   Cardiovascular: Normal rate and regular rhythm.    Murmur heard.  Pulmonary/Chest: Effort normal. No respiratory distress. He has no wheezes.   Abdominal: Soft. He exhibits no distension. There is no tenderness.   Musculoskeletal:   Swelling left leg   Neurological: He is alert and oriented to person, place, and time.   Skin: Skin is warm and dry. He is not diaphoretic.   Psychiatric: He has a normal mood and affect. His behavior is normal.   Nursing note and vitals reviewed.      Recent Labs      02/14/18   1245  02/15/18   0300   WBC  10.1  9.5   RBC  4.69*  4.02*   HEMOGLOBIN  14.2  12.2*   HEMATOCRIT  42.6  36.2*   MCV  90.8  90.0   MCH  30.3  30.3   MCHC  33.3*  33.7   RDW  43.5  43.1   PLATELETCT  142*  140*   MPV  9.9  10.6     Recent Labs      02/14/18   1245  02/15/18   0420   SODIUM  137  134*   POTASSIUM  4.5  5.6*   CHLORIDE  107  107   CO2  19*  19*   GLUCOSE  284*  199*   BUN  15  16   CREATININE  0.74  0.64   CALCIUM  8.4*  8.0*     Recent Labs      02/14/18   1245  02/15/18   0045   APTT  53.9*  50.2*   INR  1.22*   --      Recent Labs      02/14/18   1245   BNPBTYPENAT  327*              Assessment/Plan     * Bilateral pulmonary embolism (CMS-HCC)- (present on admission)   Assessment & Plan    Noted on CT scan from [place].   Troponin is elevated at 0.41 consistent with right heart strain.  Alteplace was administered on 2/14 with admission to the ICU.  IV heparin drip will transition to DOAC after 24 hours post lytic therapy.  Critical care consulted.        DVT, lower extremity, proximal, acute, left (CMS-HCC)- (present on admission)   Assessment & Plan    This is his second DVT with the first being in 2002  Ultrasound:  Left lower extremity -     Echolucent material is visualized extending from the distal external iliac    vein into the common femoral, profunda femoral, superficial femoral, and    the  popliteal veins that has the appearance of acute venous thrombosis.    The peroneal and posterior tibial veins appear to be partially compressible    with limited flow response to augmentation demonstrated.         Acute respiratory failure with hypoxia (CMS-[facility])- (present on admission)   Assessment & Plan    Requiring supplemental oxygen.        Cardiomyopathy (CMS-[facility])- (present on admission)   Assessment & Plan    Cardiology consult.  Unclear if ischemic or non-ischemic  Echo:  CONCLUSIONS  No prior study is available for comparison.   Severely reduced left ventricular systolic function.  Left ventricular ejection fraction is visually estimated to be 20%.  Diastolic function is difficult to asses.   Mild mitral regurgitation.  Unable to estimate pulmonary artery pressure due to an inadequate   tricuspid regurgitant jet        Type 2 diabetes mellitus (CMS-[facility])- (present on admission)   Assessment & Plan    Glycohemoglobin is 12 consistent with poor outpatient control.  He is not interested in insulin but is amenable to oral metformin (hold due to contrast) and glyburide which he has been on.  Diet and exercise discussed.          Quality-Core Measures

## 2018-02-15 NOTE — PROGRESS NOTES
Pulmonary Critical Care Progress Note        Date of admission: 2/14/2018  Date of service: 2/15/2018    Chief Complaint: SOB, leg swelling    History of Present Illness: 57 y.o. male with a past medical history of diabetes, ischemic CVA 3.5 years ago, and heart failure (now resolved per patient)  Patient presents as a transfer from [facility] for evaluation of bilateral PEs. The patient is noted pain swelling and redness to his left leg for approximately 2 weeks, and yesterday began having shortness of breath. This morning he awoke feeling more short of breath and presented to the hospital after a syncopal episode. A CT scan of the chest was performed which demonstrated bilateral pulmonary emboli without abnormal RV:LV. He was started on heparin infusion and transferred here for subspecialty care. In our emergency department the patient was found to have an elevated troponin at 0.41, elevated BNP at 327, PTT was noted at 53.9 (on heparin drip), he is requiring 3 L oxygen via nasal cannula and has been hemodynamically stable.     He denies any melena, hematochezia, hematuria, no recent surgeries, no ischemic CVA within the last 3 months, no known malignant intracranial neoplasm, no known structural intracranial cerebrovascular disease, and no recent closed head injury.     He denies any trauma, hormone replacement therapy, history of blood clots, recent travel, family history of venous thromboembolic disease or known malignancy      ROS:  Respiratory: negative cough, negative hemoptysis, positive pleuritic chest pain and negative shortness of breath, Cardiac: negative chest pain, negative orthopnea and negative leg swelling, GI: negative nausea, negative vomiting and negative abdominal pain.  All other systems negative.    Interval Events:  24 hour interval history reviewed    - L sided weakness from prior CVA, unchanged   - mild drop in Hgb   - TPA @ 1641, now on heparin gtt   - mild tongue  ecchymosis/hematoma    - elevated glucose, HGBA1c @ 12   - decreased trop   - LE doppler: CONCLUSIONS Left  lower extremity - Acute thrombosis of iliac through popliteal veins and probably calf veins.     PFSH:  No change.    Respiratory:   3-->2 lpm n/c  Pulse Oximetry: 99 %          Exam: unlabored respirations, no intercostal retractions or accessory muscle use and rhonchi bibasilar  ImagingAvailable data reviewed (personally reviewed CT chest): B extensive pulmonary emboli with elevated RV/LV ratio and contrast reflux seen into liver        Invalid input(s): [id number]    HemoDynamics:  Pulse: 75, Heart Rate (Monitored): 75  Blood Pressure: 114/82, NIBP: 129/81       Exam: regular rate and rhythm, regular rhythm (Sinus)  Imaging: Available data reviewed  Recent Labs      02/14/18   1245  02/15/18   0300   TROPONINI  0.41*  0.24*   BNPBTYPENAT  327*   --        Neuro:  GCS Total [place] Coma Score: 15       Exam: no focal deficits noted mental status intact oriented for age x3  Imaging: Available data reviewed    Fluids:  Intake/Output       02/13/18 0700 - 02/14/18 0659 (Not Admitted) 02/14/18 0700 - 02/15/18 0659 02/15/18 0700 - 02/16/18 0659      [id number] Total 8745-0548 4553-0805 Total 6134-3720 0443-5215 Total       Intake    P.O.  --  -- --  --  200 200  --  -- --    P.O. -- -- -- -- 200 200 -- -- --    I.V.  --  -- --  --  229.4 229.4  --  -- --    Heparin Volume -- -- -- -- 229.4 229.4 -- -- --    Total Intake -- -- -- -- 429.4 429.4 -- -- --       Output    Urine  --  -- --  --  250 250  --  -- --    Void (ml) -- -- -- -- 250 250 -- -- --    Total Output -- -- -- -- 250 250 -- -- --       Net I/O     -- -- -- -- 179.4 179.4 -- -- --        Weight: 95.3 kg (210 lb)  Recent Labs      02/14/18   1245  02/15/18   0420   SODIUM  137  134*   POTASSIUM  4.5  5.6*   CHLORIDE  107  107   CO2  19*  19*   BUN  15  16   CREATININE  0.74  0.64   CALCIUM  8.4*  8.0*       GI/Nutrition:  Exam: abdomen is  soft and non-tender, normal active bowel sounds  Imaging: Available data reviewed  taking PO  Liver Function  Recent Labs      18   1245  02/15/18   0420   ALTSGPT  62*   --    ASTSGOT  59*   --    ALKPHOSPHAT  75   --    TBILIRUBIN  0.6   --    LIPASE  99*   --    GLUCOSE  284*  199*       Heme:  Recent Labs      18   1245  02/15/18   0045  02/15/18   0300   RBC  4.69*   --   4.02*   HEMOGLOBIN  14.2   --   12.2*   HEMATOCRIT  42.6   --   36.2*   PLATELETCT  142*   --   140*   PROTHROMBTM  15.1*   --    --    APTT  53.9*  50.2*   --    INR  1.22*   --    --        Infectious Disease:  Temp  Av.4 °C (97.6 °F)  Min: 36.4 °C (97.5 °F)  Max: 36.5 °C (97.7 °F)  Micro: reviewed  Recent Labs      18   1245  02/15/18   0300   WBC  10.1  9.5   NEUTSPOLYS  69.90  72.80*   LYMPHOCYTES  24.80  22.80   MONOCYTES  4.40  3.50   EOSINOPHILS  0.00  0.90   BASOPHILS  0.00  0.00   ASTSGOT  59*   --    ALTSGPT  62*   --    ALKPHOSPHAT  75   --    TBILIRUBIN  0.6   --      Current Facility-Administered Medications   Medication Dose Frequency Provider Last Rate Last Dose   • NS infusion 1,000 mL  1,000 mL Once Som Gudino M.D.       • senna-docusate (PERICOLACE or SENOKOT S) 8.6-50 MG per tablet 2 Tab  2 Tab BID Ki Chan M.D.        And   • polyethylene glycol/lytes (MIRALAX) PACKET 1 Packet  1 Packet QDAY PRN Ki Chan M.D.        And   • magnesium hydroxide (MILK OF MAGNESIA) suspension 30 mL  30 mL QDAY PRN Ki Chan M.D.        And   • bisacodyl (DULCOLAX) suppository 10 mg  10 mg QDAY PRN Ki Chan M.D.       • Respiratory Care per Protocol   Continuous RT Ki Chan M.D.       • labetalol (NORMODYNE,TRANDATE) injection 10 mg  10 mg Q4HRS PRN Ki Chan M.D.       • enalaprilat (VASOTEC) injection 1.25 mg  1.25 mg Q6HRS PRN Ki Chan M.D.       • ondansetron (ZOFRAN) syringe/vial injection 4 mg  4 mg Q4HRS PRN Ki Chan M.D.       • ondansetron (ZOFRAN ODT)  dispertab 4 mg  4 mg Q4HRS PRN Ki Chan M.D.       • promethazine (PHENERGAN) tablet 12.5-25 mg  12.5-25 mg Q4HRS PRN Ki Chan M.D.       • promethazine (PHENERGAN) suppository 12.5-25 mg  12.5-25 mg Q4HRS PRN Ki Chan M.D.       • prochlorperazine (COMPAZINE) injection 5-10 mg  5-10 mg Q4HRS PRN Ki Chan M.D.       • acetaminophen (TYLENOL) tablet 650 mg  650 mg Q6HRS PRN Ki Chan M.D.       • insulin regular (HUMULIN R) injection 1-6 Units  1-6 Units 4X/DAY ACHS Ki Chan M.D.   2 Units at 02/14/18 2053    And   • glucose 4 g chewable tablet 16 g  16 g Q15 MIN PRN Ki Chan M.D.        And   • dextrose 50% (D50W) injection 25 mL  25 mL Q15 MIN PRN Ki Chan M.D.       • NS infusion   Once Douglas Barth Jr., D.O.       • heparin injection 4,000 Units  4,000 Units PRN Douglas Barth Jr., D.O.   4,000 Units at 02/15/18 0218    And   • heparin infusion 25,000 units in 500 ml 0.45% nacl   Continuous Douglas Barth Jr., D.O. 21 mL/hr at 02/15/18 0217 1,050 Units/hr at 02/15/18 0217     Last reviewed on 2/14/2018 12:48 PM by Gennaro oSsa    Quality  Measures:  Radiology images reviewed, Labs reviewed and Medications reviewed        DVT Prophylaxis: Heparin  DVT prophylaxis - mechanical: SCDs  Ulcer prophylaxis: Not indicated    Assessed for rehab: Patient returned to prior level of function, rehabilitation not indicated at this time      Assessment/Plan:  Submassive pulmonary embolus with evidence of right heart strain s/p TPA 2/14              - cont pos-fibrinolysis heparin gtt   - TTE pending   - will resume Eliquis (has been on it in the past) post 24 hr TPA window   - monitor for bleeding complications including neuro checks   Acute hypoxic respiratory failure Secondary to pulmonary embolus              - RT/O2 Protocols              - Titrate supplemental FiO2 to maintain SpO2 >88%  Diabetes - refusing insulin   - glyburide, eventual metformin (72  hrs post-CT with contrast)   - monitor glucose closely   Hypertension              - Goal SBP <180 mmHg              - PRN's available  Acute LLE DVT - anticoagulation as described above  Prior CVA with deficits  Prophylaxis, diet       Discussed patient condition and risk of morbidity and/or mortality with RN, RT, Pharmacy, Charge nurse / hot rounds, Patient and hospitalist.    The patient remains critically ill.  Critical care time = 32 minutes in directly providing and coordinating critical care and extensive data review.  No time overlap and excludes procedures.

## 2018-02-15 NOTE — ASSESSMENT & PLAN NOTE
Glycohemoglobin is 12 consistent with poor outpatient control.  He is not interested in insulin but is amenable to oral metformin (hold due to contrast and possible heart cath) and glyburide which he has been on in the past.  Diet and exercise discussed at length.

## 2018-02-15 NOTE — CARE PLAN
Problem: Communication  Goal: The ability to communicate needs accurately and effectively will improve    Intervention: Educate patient and significant other/support system about the plan of care, procedures, treatments, medications and allow for questions  Educated patient on plan of care, medication and safety. Patient and wife verbalized understanding.       Problem: Safety  Goal: Will remain free from injury  Outcome: PROGRESSING AS EXPECTED  Educated patient on safety precautions and bed rest due to medication TPA. Patient verbalized understanding.

## 2018-02-15 NOTE — ASSESSMENT & PLAN NOTE
Noted on CT scan from [place].   Troponin is elevated at 0.41 consistent with right heart strain.  Alteplace was administered on 2/14 with admission to the ICU.  IV heparin drip will transition to DOAC if stress test is negative otherwise keep heparin until heart cath  Critical care consulted.

## 2018-02-15 NOTE — CARE PLAN
Problem: Safety  Goal: Will remain free from falls    Intervention: Assess risk factors for falls  Progressing as expected      Problem: Knowledge Deficit  Goal: Knowledge of the prescribed therapeutic regimen will improve    Intervention: Discuss information regarding therpeutic regimen and document in education  Progressing as expected.

## 2018-02-15 NOTE — PROGRESS NOTES
Late Entry:    Pt transported to unit with MANISHA Agee at 1930. All belongings with patient. Family at bedside. CHG done. Skin check done. Admit profile done. Call light in place. Bed alarm on. Pt educated on bed rest and plan of care. Verbalized understanding.

## 2018-02-16 LAB
ANION GAP SERPL CALC-SCNC: 9 MMOL/L (ref 0–11.9)
APTT PPP: 35.1 SEC (ref 24.7–36)
APTT PPP: 46.8 SEC (ref 24.7–36)
APTT PPP: 55 SEC (ref 24.7–36)
BUN SERPL-MCNC: 14 MG/DL (ref 8–22)
CALCIUM SERPL-MCNC: 8.3 MG/DL (ref 8.5–10.5)
CHLORIDE SERPL-SCNC: 105 MMOL/L (ref 96–112)
CO2 SERPL-SCNC: 19 MMOL/L (ref 20–33)
CREAT SERPL-MCNC: 0.55 MG/DL (ref 0.5–1.4)
ERYTHROCYTE [DISTWIDTH] IN BLOOD BY AUTOMATED COUNT: 42.1 FL (ref 35.9–50)
GLUCOSE BLD-MCNC: 136 MG/DL (ref 65–99)
GLUCOSE BLD-MCNC: 164 MG/DL (ref 65–99)
GLUCOSE BLD-MCNC: 200 MG/DL (ref 65–99)
GLUCOSE SERPL-MCNC: 125 MG/DL (ref 65–99)
HCT VFR BLD AUTO: 39.3 % (ref 42–52)
HGB BLD-MCNC: 13.1 G/DL (ref 14–18)
MCH RBC QN AUTO: 30.3 PG (ref 27–33)
MCHC RBC AUTO-ENTMCNC: 33.3 G/DL (ref 33.7–35.3)
MCV RBC AUTO: 91 FL (ref 81.4–97.8)
PLATELET # BLD AUTO: 189 K/UL (ref 164–446)
PMV BLD AUTO: 9.8 FL (ref 9–12.9)
POTASSIUM SERPL-SCNC: 4 MMOL/L (ref 3.6–5.5)
RBC # BLD AUTO: 4.32 M/UL (ref 4.7–6.1)
SODIUM SERPL-SCNC: 133 MMOL/L (ref 135–145)
WBC # BLD AUTO: 10.1 K/UL (ref 4.8–10.8)

## 2018-02-16 PROCEDURE — 82962 GLUCOSE BLOOD TEST: CPT

## 2018-02-16 PROCEDURE — 85730 THROMBOPLASTIN TIME PARTIAL: CPT

## 2018-02-16 PROCEDURE — 85027 COMPLETE CBC AUTOMATED: CPT

## 2018-02-16 PROCEDURE — 80048 BASIC METABOLIC PNL TOTAL CA: CPT

## 2018-02-16 PROCEDURE — A9270 NON-COVERED ITEM OR SERVICE: HCPCS | Performed by: HOSPITALIST

## 2018-02-16 PROCEDURE — 36415 COLL VENOUS BLD VENIPUNCTURE: CPT

## 2018-02-16 PROCEDURE — A9270 NON-COVERED ITEM OR SERVICE: HCPCS | Performed by: INTERNAL MEDICINE

## 2018-02-16 PROCEDURE — 700102 HCHG RX REV CODE 250 W/ 637 OVERRIDE(OP): Performed by: HOSPITALIST

## 2018-02-16 PROCEDURE — 99233 SBSQ HOSP IP/OBS HIGH 50: CPT | Performed by: HOSPITALIST

## 2018-02-16 PROCEDURE — 700102 HCHG RX REV CODE 250 W/ 637 OVERRIDE(OP): Performed by: INTERNAL MEDICINE

## 2018-02-16 PROCEDURE — 770020 HCHG ROOM/CARE - TELE (206)

## 2018-02-16 PROCEDURE — 700111 HCHG RX REV CODE 636 W/ 250 OVERRIDE (IP): Performed by: INTERNAL MEDICINE

## 2018-02-16 RX ORDER — METOPROLOL SUCCINATE 50 MG/1
100 TABLET, EXTENDED RELEASE ORAL EVERY EVENING
Status: DISCONTINUED | OUTPATIENT
Start: 2018-02-16 | End: 2018-02-17 | Stop reason: HOSPADM

## 2018-02-16 RX ADMIN — METOPROLOL SUCCINATE 100 MG: 50 TABLET, EXTENDED RELEASE ORAL at 20:31

## 2018-02-16 RX ADMIN — GLYBURIDE 5 MG: 5 TABLET ORAL at 17:48

## 2018-02-16 RX ADMIN — HEPARIN SODIUM 1300 UNITS/HR: 5000 INJECTION, SOLUTION INTRAVENOUS at 20:43

## 2018-02-16 RX ADMIN — SPIRONOLACTONE 12.5 MG: 25 TABLET, FILM COATED ORAL at 08:16

## 2018-02-16 RX ADMIN — LISINOPRIL 5 MG: 5 TABLET ORAL at 08:16

## 2018-02-16 RX ADMIN — GLYBURIDE 5 MG: 5 TABLET ORAL at 08:16

## 2018-02-16 RX ADMIN — HEPARIN SODIUM 4000 UNITS: 1000 INJECTION, SOLUTION INTRAVENOUS; SUBCUTANEOUS at 22:55

## 2018-02-16 RX ADMIN — HEPARIN SODIUM 4000 UNITS: 1000 INJECTION, SOLUTION INTRAVENOUS; SUBCUTANEOUS at 08:18

## 2018-02-16 RX ADMIN — INSULIN HUMAN 1 UNITS: 100 INJECTION, SOLUTION PARENTERAL at 10:33

## 2018-02-16 RX ADMIN — FUROSEMIDE 20 MG: 20 TABLET ORAL at 16:51

## 2018-02-16 RX ADMIN — FUROSEMIDE 20 MG: 20 TABLET ORAL at 06:21

## 2018-02-16 ASSESSMENT — ENCOUNTER SYMPTOMS
FEVER: 0
COUGH: 0
CHILLS: 0
VOMITING: 0
SHORTNESS OF BREATH: 0
ROS GI COMMENTS: EATING WELL
DIZZINESS: 0
NAUSEA: 0

## 2018-02-16 ASSESSMENT — COGNITIVE AND FUNCTIONAL STATUS - GENERAL
SUGGESTED CMS G CODE MODIFIER MOBILITY: CH
DAILY ACTIVITIY SCORE: 24
SUGGESTED CMS G CODE MODIFIER DAILY ACTIVITY: CH
MOBILITY SCORE: 24

## 2018-02-16 ASSESSMENT — PAIN SCALES - GENERAL
PAINLEVEL_OUTOF10: 0
PAINLEVEL_OUTOF10: 6
PAINLEVEL_OUTOF10: 0
PAINLEVEL_OUTOF10: 0

## 2018-02-16 NOTE — PROGRESS NOTES
Late Entry    Bedside report received from Manjit Julien RN and pt care assumed at approx. 1845.  Lines and infusions verified.  Heparin infusing at 1100 units/hr through R PIV.  Next APTT at 2230.  Plan of care discussed with pt and pt's wife.      2230 APTT 41.1.  Gave 4000 unit heparin bolus and increased heparin infusion to 1200 units/hr per post-fibrinolytic protocol.

## 2018-02-16 NOTE — CARE PLAN
Problem: Safety  Goal: Will remain free from injury  Bed alarm on. Patient educated on fall risks and importance of using call light.  Fall precautions in place: treaded slipper socks, bed is in low position, personal belongings, wastebasket, call light, and phone are within patient's reach.        Problem: Pain Management  Goal: Pain level will decrease to patient's comfort goal    Intervention: Follow pain managment plan developed in collaboration with patient and Interdisciplinary Team  Pain assessed q4hrs and PRN.  Pain medications given per MAR.  Refer to flowsheet for non-pharmalogical interventions. Reviewed pain goals with patient and family.

## 2018-02-16 NOTE — CONSULTS
DATE OF SERVICE:  02/15/2018    REQUESTING PHYSICIAN:  Matty Valentine MD    PATIENT IDENTIFICATION:  The patient is a 57-year-old male who was admitted   with a submassive bilateral pulmonary emboli and received thrombolytic   therapy.  Cardiology consultation was requested because of a cardiomyopathy   noted on echocardiography.    HISTORY OF PRESENT ILLNESS:  Patient has a history of DVT in 2006.  In   addition, he had a stroke in August 2015.  At that time, he was found to have   a cardiomyopathy.  He underwent stress testing, but no cardiac   catheterization.  He was placed on medical therapy and his ejection fraction   improved.  He was followed by [facility] cardiology, but   he has not followed up with the intensive medications and were discontinued in   April of 2016.  He was doing well until Sunday when he had an episode of   nausea and chills.  He pretty much stayed in bed that day.  Went back to work   earlier in the week, but began having shortness of breath even at rest.  He   became very weak and ultimately ended up in [facility]   emergency room.  A CT scan was obtained, which showed submassive pulmonary   embolism with EKG evidence of right heart strain.  He was transferred to   [facility] where pulmonary consultation was obtained and   thrombolytic therapy was recommended.  He received thrombolytic therapy and   then today he had an echocardiogram, which showed a left ventricular ejection   fraction of approximately 20%.    Prior to his episode of pulmonary embolism, patient was feeling well.  He has   had no chest discomfort, dyspnea on exertion, PND, orthopnea, or edema.  He   has had no palpitations or lightheadedness.    PAST MEDICAL HISTORY AND MEDICAL ILLNESSES:  Remarkable for prior CVA, which   was felt to be embolic in etiology.  He has some mild residual left-sided   weakness.  He has type 2 diabetes, but is not taking medical  "therapy.  Patient   also has a history of DVT.    PAST SURGICAL HISTORY:  Tonsillectomy.    ALLERGIES:  None known.    MEDICATIONS:  None.    SOCIAL HISTORY:  Patient is .  He does not smoke.  He does occasionally   drink.    FAMILY HISTORY:  Remarkable for his father having bypass graft surgery in his   mid to late 70s.    REVIEW OF SYSTEMS:  CONSTITUTIONAL:  Remarkable for feeling cold \"all the time.\"  EYES:  Unremarkable.  ENT:  Unremarkable.  RESPIRATORY:  Remarkable for chronic cough productive of clear sputum.  Does   have some postnasal drip, which may be contributory.  CARDIOVASCULAR:  As noted above.  GI:  Unremarkable.  :  Unremarkable.  ORTHOPEDIC:  Unremarkable.  NEUROLOGIC:  Remarkable for some mild left-sided weakness since his stroke.  HEMATOLOGIC:  Unremarkable.  ENDOCRINE:  Remarkable for adult-onset diabetes for which he is not taking any   medications.  He does not know his thyroid status.  SKIN:  Unremarkable.  ALLERGIC AND IMMUNOLOGIC:  Unremarkable.    PHYSICAL EXAMINATION:  VITAL SIGNS:  Blood pressure 140/95, pulse of 90.  Patient is afebrile, height   5 feet 10 inches, weight 210 pounds.  GENERAL APPEARANCE:  Alert, mildly obese, well-developed, middle-aged male in   no acute distress.  EYES:  PERRLA, EOMI.  Lids and conjunctivae are clear.  MOUTH:  Good oral hygiene.  Mucosa is normal.  NECK:  No thyromegaly, cervical adenopathy, jugular venous distention is   noted.  CHEST:  Respiratory is normal.  LUNGS:  Clear to auscultation and percussion.  CARDIAC:  There is a regular rate and rhythm.  S1, S2 are grossly normal.    There is no S3 or S4 appreciated.  No murmurs, clicks, rubs are noted.  ABDOMEN:  Soft, obese, nontender, no hepatosplenomegaly noted.  MUSCULOSKELETAL:  Patient has significant kyphoscoliosis.  Muscle strength and   tone are normal.  EXTREMITIES:  No clubbing or cyanosis is noted.  The patient does have 2+ left   pretibial edema.  Pulses are 2+ and symmetrical.  " No carotid bruits are   noted.  SKIN:  Inspection and palpation noncontributory.  NEUROLOGIC:  No focal exam is noted.  The patient actually has good bilateral   strength in the upper and lower extremities.  PSYCHIATRIC:  The patient is alert and oriented x3.  Mood and affect are   appropriate.    LABORATORY DATA:  Hemoglobin 12.2, white blood cell count 9500.  Sodium 134,   potassium 4.3, BUN 16, creatinine 0.70.  LFTs are normal, though they were   minimally elevated on admission yesterday.  TSH has not been obtained.  A   lower extremity venous study showed extensive left lower extremity DVT   extending into the iliac vein.  Echocardiogram showed normal right atrial and   right ventricular size.  The estimated right ventricular systolic pressure was   unable to be obtained because of an inadequate TR jet.  There was dilated   inferior vena cava with inspiratory collapse.  Patient's left ventricle was   normal size and there was mild concentric LVH.  There is severe reduction in   ventricular systolic function with global hypokinesis.  Ejection fraction was   visually estimated to be 20%.    EKGs:  Patient's EKG from yesterday showed a normal sinus rhythm with a   nonspecific intraventricular conduction delay.  Borderline first-degree AV   block was present.  I do not have the EKGs to review from [place].    IMPRESSION:  1.  Deep venous thrombosis with submassive pulmonary emboli.  I suspect the   patient did have significant right ventricular dysfunction prior to   thrombolytic therapy.  However, post thrombolytic therapy, his lower extremity edema is improving and the   right side of his heart is of normal size.  Thrombolytic therapy appears to   have been quite successful.  2.  Cardiomyopathy.  The patient has a history of a cardiomyopathy and prior   stress test, which was probably a myocardial perfusion scan.  I would like to   obtain those records for review.  However, given his prior history, I feel we    should place him back on medical therapy and see if his LV function improves.    If it does not, then he needs to be considered for a defibrillator and   cardiac catheterization at that time.  However, we would repeat a myocardial   perfusion scan during this admission.  Obviously, if he has evidence of   significant ischemia, then we will consider earlier cardiac catheterization.  3.  Recurrent deep venous thrombosis.  Patient will need lifelong   anticoagulation.  4.  History of prior stroke with minimal residual.  This was probably embolic.    RECOMMENDATIONS:  1.  Obtain records from [facility] cardiology.  2.  Start medical therapy for cardiomyopathy, including low-dose   beta-blocker therapy, ACE inhibitor therapy and diuretics.  3.  Patient will be considered for a myocardial perfusion scan once he is out   of the coronary intensive care unit.       ____________________________________     MD AGUEDA PINON / DAX    DD:  02/15/2018 17:15:02  DT:  02/15/2018 22:45:19    D#:  [id number]  Job#:  [id number]

## 2018-02-16 NOTE — PROGRESS NOTES
Cardiology Progress Note               Author: Michel Gonzalez Date & Time created: 2018  8:42 AM     Interval History:  Stable over night    Chief Complaint:  Dyspnea due to pulmonary emboli  Cardiomyopathy     Review of Systems   Respiratory: Negative for shortness of breath.    Cardiovascular: Positive for leg swelling. Negative for chest pain.       Physical Exam   Neck: No JVD present.   Cardiovascular: Normal rate and regular rhythm.  Exam reveals no gallop.    No murmur heard.  Pulmonary/Chest: Effort normal. He has no rales.   Abdominal: Soft. There is no tenderness.   Musculoskeletal: He exhibits edema (1-2+ left pretibial).       Hemodynamics:  Temp (24hrs), Av.2 °C (97.1 °F), Min:35.9 °C (96.6 °F), Max:36.3 °C (97.4 °F)  Temperature: 36.3 °C (97.3 °F)  Pulse  Av.8  Min: 64  Max: 109Heart Rate (Monitored): 84  NIBP: 121/73     Respiratory:    Respiration: 20, Pulse Oximetry: 100 %     Work Of Breathing / Effort: Mild  RUL Breath Sounds: Clear, RML Breath Sounds: Clear, RLL Breath Sounds: Diminished, LATASHA Breath Sounds: Clear, LLL Breath Sounds: Diminished  Fluids:     Weight: 99.7 kg (219 lb 12.8 oz)  GI/Nutrition:  Orders Placed This Encounter   Procedures   • DIET ORDER     Standing Status:   Standing     Number of Occurrences:   1     Order Specific Question:   Diet:     Answer:   Regular [1]     Order Specific Question:   Miscellaneous modifications:     Answer:   No Decaf, No Caffeine(for test) [11]     Comments:   Protocol 1313 Patient to have no caffeine for 12 hours prior to exam (decaf, coffee, cola, tea, chocolate)     Lab Results:  Recent Labs      18   1245  02/15/18   0300  18   0600   WBC  10.1  9.5  10.1   RBC  4.69*  4.02*  4.32*   HEMOGLOBIN  14.2  12.2*  13.1*   HEMATOCRIT  42.6  36.2*  39.3*   MCV  90.8  90.0  91.0   MCH  30.3  30.3  30.3   MCHC  33.3*  33.7  33.3*   RDW  43.5  43.1  42.1   PLATELETCT  142*  140*  189   MPV  9.9  10.6  9.8     Recent Labs       02/15/18   0420  02/15/18   0804  02/16/18   0600   SODIUM  134*  134*  133*   POTASSIUM  5.6*  4.3  4.0   CHLORIDE  107  105  105   CO2  19*  22  19*   GLUCOSE  199*  185*  125*   BUN  16  16  14   CREATININE  0.64  0.70  0.55   CALCIUM  8.0*  8.4*  8.3*     Recent Labs      02/14/18   1245   02/15/18   1537  02/15/18   2235  02/16/18   0600   APTT  53.9*   < >  61.5*  41.1*  35.1   INR  1.22*   --    --    --    --     < > = values in this interval not displayed.     Recent Labs      02/14/18   1245   BNPBTYPENAT  327*     Recent Labs      02/14/18   1245  02/15/18   0300   TROPONINI  0.41*  0.24*   BNPBTYPENAT  327*   --              Medical Decision Making, by Problem:  Active Hospital Problems    Diagnosis   • *Bilateral pulmonary embolism (CMS-[facility]) [I26.99]   • DVT, lower extremity, proximal, acute, left (CMS-[facility]) [I82.4Y2]   • Acute respiratory failure with hypoxia (CMS-[facility]) [J96.01]   • Type 2 diabetes mellitus (CMS-[facility]) [E11.9]   • Cardiomyopathy (CMS-[facility]) [I42.9]     Pt stable. Awaiting records from [facility].    Plan:  OK for tele  MPI in AM  Increase BB    Quality-Core Measures

## 2018-02-16 NOTE — RESPIRATORY CARE
COPD EDUCATION by COPD CLINICAL EDUCATOR  2/15/2018 at 5:34 PM by Julieta Jackson     Patient reviewed by COPD education team. Patient does not qualify for COPD program.

## 2018-02-16 NOTE — PROGRESS NOTES
Pulmonary Critical Care Progress Note        Date of admission: 2/14/2018  Date of service: 2/16/2018    Chief Complaint: SOB, leg swelling    History of Present Illness: 57 y.o. male with a past medical history of diabetes, ischemic CVA 3.5 years ago, and heart failure (now resolved per patient)  Patient presents as a transfer from [facility] for evaluation of bilateral PEs. The patient is noted pain swelling and redness to his left leg for approximately 2 weeks, and yesterday began having shortness of breath. This morning he awoke feeling more short of breath and presented to the hospital after a syncopal episode. A CT scan of the chest was performed which demonstrated bilateral pulmonary emboli without abnormal RV:LV. He was started on heparin infusion and transferred here for subspecialty care. In our emergency department the patient was found to have an elevated troponin at 0.41, elevated BNP at 327, PTT was noted at 53.9 (on heparin drip), he is requiring 3 L oxygen via nasal cannula and has been hemodynamically stable.     He denies any melena, hematochezia, hematuria, no recent surgeries, no ischemic CVA within the last 3 months, no known malignant intracranial neoplasm, no known structural intracranial cerebrovascular disease, and no recent closed head injury.     He denies any trauma, hormone replacement therapy, history of blood clots, recent travel, family history of venous thromboembolic disease or known malignancy      ROS:  Respiratory: negative cough, negative hemoptysis, negative pleuritic chest pain and negative shortness of breath, Cardiac: negative chest pain, negative palpitations and negative leg swelling, GI: negative nausea, negative abdominal pain and negative blood in stool.  All other systems negative.    Interval Events:  24 hour interval history reviewed    - no overnight events   - Hgb and neuro exam wnl    PFSH:  No change.    Physical Exam   Constitutional: He is  oriented to person, place, and time. He appears well-developed and well-nourished. No distress.   HENT:   Head: Normocephalic and atraumatic.   Mouth/Throat: Oropharynx is clear and moist. No oropharyngeal exudate.   Eyes: Conjunctivae and EOM are normal. Pupils are equal, round, and reactive to light.   Neck: Normal range of motion. Neck supple. No JVD present.   Cardiovascular: Normal rate, regular rhythm, normal heart sounds and intact distal pulses.    No murmur heard.  Pulmonary/Chest: Effort normal and breath sounds normal. No respiratory distress. He has no wheezes. He has no rales.   Abdominal: Soft. Bowel sounds are normal. He exhibits no distension. There is no tenderness.   Musculoskeletal: Normal range of motion. He exhibits no edema or tenderness.   Neurological: He is alert and oriented to person, place, and time. No cranial nerve deficit or sensory deficit.   Skin: Skin is warm and dry. Capillary refill takes less than 2 seconds. No erythema.   Psychiatric: He has a normal mood and affect. His behavior is normal. Judgment and thought content normal.   Nursing note and vitals reviewed.      Respiratory:   2 lpm n/c  Pulse Oximetry: 100 %          ImagingAvailable data reviewedno film today        Invalid input(s): [id number]    HemoDynamics:  Pulse: 68, Heart Rate (Monitored): 84  NIBP: 108/70       Imaging: Available data reviewed - TTE reviewed (2/15):  CONCLUSIONS  No prior study is available for comparison.   Severely reduced left ventricular systolic function.  Left ventricular ejection fraction is visually estimated to be 20%.  Diastolic function is difficult to asses.   Mild mitral regurgitation.  Unable to estimate pulmonary artery pressure due to an inadequate   tricuspid regurgitant jet.  Recent Labs      02/14/18   1245  02/15/18   0300   TROPONINI  0.41*  0.24*   BNPBTYPENAT  327*   --        Neuro:  GCS Total Marilynn Coma Score: 15       Imaging: Available data  reviewed    Fluids:  Intake/Output       02/14/18 0700 - 02/15/18 0659 02/15/18 0700 - 02/16/18 0659 02/16/18 0700 - 02/17/18 0659      0259-7158 4160-1918 Total 3345-3966 2885-0257 Total 7826-3929 6621-7495 Total       Intake    P.O.  --  200 200  1200  360 1560  --  -- --    P.O. --  360 1560 -- -- --    I.V.  --  229.4 229.4  264.9  277 541.9  --  -- --    Heparin Volume -- 229.4 229.4 264.9 277 541.9 -- -- --    Total Intake -- 429.4 429.4 1464.9 637 2101.9 -- -- --       Output    Urine  --  250 250  1050  -- 1050  --  -- --    Number of Times Voided -- -- -- 2 x 1 x 3 x -- -- --    Void (ml) --  -- 1050 -- -- --    Total Output --  -- 1050 -- -- --       Net I/O     -- 179.4 179.4 414.9 637 1051.9 -- -- --        Weight: 99.7 kg (219 lb 12.8 oz)  Recent Labs      02/15/18   0420  02/15/18   0804  02/16/18   0600   SODIUM  134*  134*  133*   POTASSIUM  5.6*  4.3  4.0   CHLORIDE  107  105  105   CO2  19*  22  19*   BUN  16  16  14   CREATININE  0.64  0.70  0.55   CALCIUM  8.0*  8.4*  8.3*       GI/Nutrition:    Imaging: Available data reviewed  taking PO  Liver Function  Recent Labs      02/14/18   1245  02/15/18   0420  02/15/18   0804  02/16/18   0600   ALTSGPT  62*   --   44   --    ASTSGOT  59*   --   20   --    ALKPHOSPHAT  75   --   64   --    TBILIRUBIN  0.6   --   0.6   --    LIPASE  99*   --    --    --    GLUCOSE  284*  199*  185*  125*       Heme:  Recent Labs      02/14/18   1245   02/15/18   0300   02/15/18   1537  02/15/18   2235  02/16/18   0600   RBC  4.69*   --   4.02*   --    --    --   4.32*   HEMOGLOBIN  14.2   --   12.2*   --    --    --   13.1*   HEMATOCRIT  42.6   --   36.2*   --    --    --   39.3*   PLATELETCT  142*   --   140*   --    --    --   189   PROTHROMBTM  15.1*   --    --    --    --    --    --    APTT  53.9*   < >   --    < >  61.5*  41.1*  35.1   INR  1.22*   --    --    --    --    --    --     < > = values in this interval not displayed.        Infectious Disease:  Temp  Av.2 °C (97.1 °F)  Min: 35.9 °C (96.6 °F)  Max: 36.3 °C (97.4 °F)  Micro: reviewed  Recent Labs      18   1245  02/15/18   0300  02/15/18   0804  18   0600   WBC  10.1  9.5   --   10.1   NEUTSPOLYS  69.90  72.80*   --    --    LYMPHOCYTES  24.80  22.80   --    --    MONOCYTES  4.40  3.50   --    --    EOSINOPHILS  0.00  0.90   --    --    BASOPHILS  0.00  0.00   --    --    ASTSGOT  59*   --   20   --    ALTSGPT  62*   --   44   --    ALKPHOSPHAT  75   --   64   --    TBILIRUBIN  0.6   --   0.6   --      Current Facility-Administered Medications   Medication Dose Frequency Provider Last Rate Last Dose   • glyBURIDE (DIABETA) tablet 5 mg  5 mg BID WITH MEALS Matty Valentine M.D.   5 mg at 02/15/18 1718   • metoprolol SR (TOPROL XL) tablet 50 mg  50 mg Q EVENING Michel Gonzalez M.D.   50 mg at 02/15/18 2107   • lisinopril (PRINIVIL) tablet 5 mg  5 mg Q DAY Michel Gonzalez M.D.   5 mg at 02/15/18 1744   • spironolactone (ALDACTONE) tablet 12.5 mg  12.5 mg Q DAY Michel Gonzalez M.D.   12.5 mg at 02/15/18 1744   • furosemide (LASIX) tablet 20 mg  20 mg BID DIURETIC Michel Gonzalez M.D.   20 mg at 18 0621   • senna-docusate (PERICOLACE or SENOKOT S) 8.6-50 MG per tablet 2 Tab  2 Tab BID Ki Chan M.D.   Stopped at 02/15/18 0900    And   • polyethylene glycol/lytes (MIRALAX) PACKET 1 Packet  1 Packet QDAY PRN Ki Chan M.D.        And   • magnesium hydroxide (MILK OF MAGNESIA) suspension 30 mL  30 mL QDAY PRN Ki Chan M.D.        And   • bisacodyl (DULCOLAX) suppository 10 mg  10 mg QDAY PRN Ki Chan M.D.       • Respiratory Care per Protocol   Continuous RT Ki Chan M.D.       • ondansetron (ZOFRAN) syringe/vial injection 4 mg  4 mg Q4HRS PRN Ki Chan M.D.       • ondansetron (ZOFRAN ODT) dispertab 4 mg  4 mg Q4HRS PRN Ki Chan M.D.       • promethazine (PHENERGAN) tablet 12.5-25 mg  12.5-25 mg Q4HRS PRN Ki FOSTER  IVELISSE Chan       • promethazine (PHENERGAN) suppository 12.5-25 mg  12.5-25 mg Q4HRS PRN Ki Chan M.D.       • prochlorperazine (COMPAZINE) injection 5-10 mg  5-10 mg Q4HRS PRN Ki Chan M.D.       • acetaminophen (TYLENOL) tablet 650 mg  650 mg Q6HRS PRN Ki Chan M.D.   650 mg at 02/15/18 2330   • insulin regular (HUMULIN R) injection 1-6 Units  1-6 Units 4X/DAY ACHLOWELL Chan M.D.   Stopped at 02/16/18 0630    And   • glucose 4 g chewable tablet 16 g  16 g Q15 MIN PRN Ki Chan M.D.        And   • dextrose 50% (D50W) injection 25 mL  25 mL Q15 MIN PRN Ki Chan M.D.       • heparin injection 4,000 Units  4,000 Units PRN Douglas Barth Jr., D.O.   4,000 Units at 02/15/18 2322    And   • heparin infusion 25,000 units in 500 ml 0.45% nacl   Continuous Douglas Barth Jr., D.O. 24 mL/hr at 02/15/18 2322 1,200 Units/hr at 02/15/18 2322     Last reviewed on 2/14/2018 12:48 PM by Gennaro Sosa    Quality  Measures:  Radiology images reviewed, Labs reviewed and Medications reviewed  Platt catheter: No Platt      DVT Prophylaxis: Heparin  DVT prophylaxis - mechanical: SCDs  Ulcer prophylaxis: Not indicated    Assessed for rehab: Patient returned to prior level of function, rehabilitation not indicated at this time      Assessment/Plan:  Submassive pulmonary embolus with evidence of right heart strain s/p TPA 2/14              - cont heparin gtt with plans for Eliquis once cardiac eval completed   - monitor for bleeding  Acute hypoxic respiratory failure Secondary to pulmonary embolus - improving              - RT/O2 Protocols  Diabetes - refusing insulin   - glyburide, eventual metformin (72 hrs post-CT with contrast)   - monitor glucose closely   Hypertension              - Goal SBP <180 mmHg              - PRN's available  Acute LLE DVT - anticoagulation as described above  Acute ischemic systolic heart failure with EF 20%   - cardiology consulted, ongoing ischemic w/u  (MPI)   - diuresis, BB, ACE-I, aldactone  Prior CVA with deficits  Prophylaxis, diet     Ok to transfer patient out of ICU today. [facility] will sign off at transfer. Please call with questions.    Discussed patient condition and risk of morbidity and/or mortality with RN, RT, Pharmacy, Charge nurse / hot rounds, Patient and hospitalist.

## 2018-02-16 NOTE — PROGRESS NOTES
Report called to RN on T811 be 2. All belongs were accounted for and with pt at time of transfer. Pt was transferred to tele on tele via wheel chair.

## 2018-02-16 NOTE — PROGRESS NOTES
[facility] Hospitalist Progress Note    Date of Service: 2018    Chief Complaint  57 y.o. male admitted 2018 with syncope.    Interval Problem Update  Mr. Small has a history of DM and CVA that had been experiencing chest pain and shortness of breath for a day prior to a syncopal episode. He had also been experiencing swelling of the left leg for 2 weeks. He presented to the ER in [place] and was found to have bilaterally PEs thus was transferred to [facility] where he was given alteplase therapy and admitted to the ICU.  Today he is feeling much better. We discussed the stress test which is ordered for risk stratification. His wife and son are at bedside and we discussed the cardiac work up.   Consultants/Specialty  Critical Care. I discussed his condition with Dr. Gonda on ICU Hot Rounds.  Cardiology. I discussed with Dr. Gonzalez this morning.  Disposition  tele        Review of Systems   Constitutional: Negative for chills and fever.   Respiratory: Negative for cough and shortness of breath.    Cardiovascular: Negative for chest pain.   Gastrointestinal: Negative for nausea and vomiting.        Eating well   Musculoskeletal:        Persistent left leg swelling   Neurological: Negative for dizziness.   All other systems reviewed and are negative.     Physical Exam  Laboratory/Imaging   Hemodynamics  Temp (24hrs), Av.2 °C (97.1 °F), Min:35.9 °C (96.6 °F), Max:36.3 °C (97.4 °F)   Temperature: 36.3 °C (97.3 °F)  Pulse  Av.8  Min: 64  Max: 109 Heart Rate (Monitored): 84  NIBP: 121/73      Respiratory      Respiration: 20, Pulse Oximetry: 100 %     Work Of Breathing / Effort: Mild  RUL Breath Sounds: Clear, RML Breath Sounds: Clear, RLL Breath Sounds: Diminished, LATASHA Breath Sounds: Clear, LLL Breath Sounds: Diminished    Fluids    Intake/Output Summary (Last 24 hours) at 18 0835  Last data filed at 18 0600   Gross per 24 hour   Intake          1699.88 ml   Output              800 ml   Net            899.88 ml       Nutrition  Orders Placed This Encounter   Procedures   • DIET ORDER     Standing Status:   Standing     Number of Occurrences:   1     Order Specific Question:   Diet:     Answer:   Regular [1]     Order Specific Question:   Miscellaneous modifications:     Answer:   No Decaf, No Caffeine(for test) [11]     Comments:   Protocol 1313 Patient to have no caffeine for 12 hours prior to exam (decaf, coffee, cola, tea, chocolate)     Physical Exam   Constitutional: He is oriented to person, place, and time. He appears well-developed and well-nourished. No distress.   HENT:   Bruising of the tongue   Neck: Neck supple.   Cardiovascular: Normal rate and regular rhythm.    Murmur heard.  Pulmonary/Chest: Effort normal. No respiratory distress. He has no wheezes.   Abdominal: Soft. He exhibits no distension.   Musculoskeletal:   Swelling left leg though negative Homans sign   Neurological: He is alert and oriented to person, place, and time.   Skin: Skin is warm and dry. He is not diaphoretic.   Bruise at previous IV site   Psychiatric: He has a normal mood and affect. His behavior is normal.   He is in good spirits.   Nursing note and vitals reviewed.      Recent Labs      02/14/18   1245  02/15/18   0300  02/16/18   0600   WBC  10.1  9.5  10.1   RBC  4.69*  4.02*  4.32*   HEMOGLOBIN  14.2  12.2*  13.1*   HEMATOCRIT  42.6  36.2*  39.3*   MCV  90.8  90.0  91.0   MCH  30.3  30.3  30.3   MCHC  33.3*  33.7  33.3*   RDW  43.5  43.1  42.1   PLATELETCT  142*  140*  189   MPV  9.9  10.6  9.8     Recent Labs      02/15/18   0420  02/15/18   0804  02/16/18   0600   SODIUM  134*  134*  133*   POTASSIUM  5.6*  4.3  4.0   CHLORIDE  107  105  105   CO2  19*  22  19*   GLUCOSE  199*  185*  125*   BUN  16  16  14   CREATININE  0.64  0.70  0.55   CALCIUM  8.0*  8.4*  8.3*     Recent Labs      02/14/18   1245   02/15/18   1537  02/15/18   2235  02/16/18   0600   APTT  53.9*   < >  61.5*  41.1*  35.1   INR  1.22*   --    --     --    --     < > = values in this interval not displayed.     Recent Labs      02/14/18   1245   BNPBTYPENAT  327*              Assessment/Plan     * Bilateral pulmonary embolism (CMS-HCC)- (present on admission)   Assessment & Plan    Noted on CT scan from [place].   Troponin is elevated at 0.41 consistent with right heart strain.  Alteplace was administered on 2/14 with admission to the ICU.  IV heparin drip will transition to DOAC if stress test is negative otherwise keep heparin until heart cath  Critical care consulted.        DVT, lower extremity, proximal, acute, left (CMS-HCC)- (present on admission)   Assessment & Plan    This is his second DVT with the first being in 2002  Ultrasound:  Left lower extremity -    Echolucent material is visualized extending from the distal external iliac    vein into the common femoral, profunda femoral, superficial femoral, and    the  popliteal veins that has the appearance of acute venous thrombosis.    The peroneal and posterior tibial veins appear to be partially compressible    with limited flow response to augmentation demonstrated.         Acute respiratory failure with hypoxia (CMS-[facility])- (present on admission)   Assessment & Plan    Requiring supplemental oxygen.        Cardiomyopathy (CMS-[facility])- (present on admission)   Assessment & Plan    Cardiology consult.  Unclear if ischemic or non-ischemic thus stress test ordered and +/- heart cath afterwards thus continue IV heparin which can be turned off.  Echo:  CONCLUSIONS  No prior study is available for comparison.   Severely reduced left ventricular systolic function.  Left ventricular ejection fraction is visually estimated to be 20%.  Diastolic function is difficult to asses.   Mild mitral regurgitation.  Unable to estimate pulmonary artery pressure due to an inadequate   tricuspid regurgitant jet        Type 2 diabetes mellitus (CMS-[facility])- (present on admission)   Assessment & Plan    Glycohemoglobin is 12  consistent with poor outpatient control.  He is not interested in insulin but is amenable to oral metformin (hold due to contrast and possible heart cath) and glyburide which he has been on in the past.  Diet and exercise discussed at length.          Quality-Core Measures   Reviewed items::  Labs reviewed and Medications reviewed  Platt catheter::  No Platt  DVT prophylaxis pharmacological::  Heparin

## 2018-02-17 ENCOUNTER — PATIENT OUTREACH (OUTPATIENT)
Dept: HEALTH INFORMATION MANAGEMENT | Facility: OTHER | Age: 58
End: 2018-02-17

## 2018-02-17 ENCOUNTER — APPOINTMENT (OUTPATIENT)
Dept: RADIOLOGY | Facility: MEDICAL CENTER | Age: 58
DRG: 175 | End: 2018-02-17
Attending: HOSPITALIST
Payer: MEDICARE

## 2018-02-17 VITALS
OXYGEN SATURATION: 95 % | HEART RATE: 68 BPM | SYSTOLIC BLOOD PRESSURE: 101 MMHG | DIASTOLIC BLOOD PRESSURE: 68 MMHG | BODY MASS INDEX: 31.5 KG/M2 | RESPIRATION RATE: 16 BRPM | HEIGHT: 70 IN | TEMPERATURE: 98.1 F | WEIGHT: 220.02 LBS

## 2018-02-17 LAB
ANION GAP SERPL CALC-SCNC: 9 MMOL/L (ref 0–11.9)
APTT PPP: 54.3 SEC (ref 24.7–36)
APTT PPP: 77.3 SEC (ref 24.7–36)
BUN SERPL-MCNC: 15 MG/DL (ref 8–22)
CALCIUM SERPL-MCNC: 7.9 MG/DL (ref 8.5–10.5)
CHLORIDE SERPL-SCNC: 106 MMOL/L (ref 96–112)
CO2 SERPL-SCNC: 21 MMOL/L (ref 20–33)
CREAT SERPL-MCNC: 0.72 MG/DL (ref 0.5–1.4)
GLUCOSE BLD-MCNC: 104 MG/DL (ref 65–99)
GLUCOSE BLD-MCNC: 115 MG/DL (ref 65–99)
GLUCOSE BLD-MCNC: 127 MG/DL (ref 65–99)
GLUCOSE SERPL-MCNC: 106 MG/DL (ref 65–99)
POTASSIUM SERPL-SCNC: 3.8 MMOL/L (ref 3.6–5.5)
SODIUM SERPL-SCNC: 136 MMOL/L (ref 135–145)
TSH SERPL DL<=0.005 MIU/L-ACNC: 2.06 UIU/ML (ref 0.38–5.33)

## 2018-02-17 PROCEDURE — A9502 TC99M TETROFOSMIN: HCPCS

## 2018-02-17 PROCEDURE — 700102 HCHG RX REV CODE 250 W/ 637 OVERRIDE(OP): Performed by: INTERNAL MEDICINE

## 2018-02-17 PROCEDURE — A9270 NON-COVERED ITEM OR SERVICE: HCPCS | Performed by: INTERNAL MEDICINE

## 2018-02-17 PROCEDURE — 36415 COLL VENOUS BLD VENIPUNCTURE: CPT

## 2018-02-17 PROCEDURE — 700111 HCHG RX REV CODE 636 W/ 250 OVERRIDE (IP)

## 2018-02-17 PROCEDURE — 700111 HCHG RX REV CODE 636 W/ 250 OVERRIDE (IP): Performed by: INTERNAL MEDICINE

## 2018-02-17 PROCEDURE — 84443 ASSAY THYROID STIM HORMONE: CPT

## 2018-02-17 PROCEDURE — 85730 THROMBOPLASTIN TIME PARTIAL: CPT

## 2018-02-17 PROCEDURE — 80048 BASIC METABOLIC PNL TOTAL CA: CPT

## 2018-02-17 PROCEDURE — 99239 HOSP IP/OBS DSCHRG MGMT >30: CPT | Performed by: INTERNAL MEDICINE

## 2018-02-17 PROCEDURE — 700102 HCHG RX REV CODE 250 W/ 637 OVERRIDE(OP): Performed by: HOSPITALIST

## 2018-02-17 PROCEDURE — 82962 GLUCOSE BLOOD TEST: CPT

## 2018-02-17 PROCEDURE — A9270 NON-COVERED ITEM OR SERVICE: HCPCS | Performed by: HOSPITALIST

## 2018-02-17 RX ORDER — SPIRONOLACTONE 25 MG/1
25 TABLET ORAL DAILY
Qty: 30 TAB | Refills: 3 | Status: SHIPPED | OUTPATIENT
Start: 2018-02-18 | End: 2018-07-02 | Stop reason: SDUPTHER

## 2018-02-17 RX ORDER — REGADENOSON 0.08 MG/ML
INJECTION, SOLUTION INTRAVENOUS
Status: COMPLETED
Start: 2018-02-17 | End: 2018-02-17

## 2018-02-17 RX ORDER — SPIRONOLACTONE 25 MG/1
25 TABLET ORAL
Status: DISCONTINUED | OUTPATIENT
Start: 2018-02-18 | End: 2018-02-17 | Stop reason: HOSPADM

## 2018-02-17 RX ORDER — ATORVASTATIN CALCIUM 40 MG/1
40 TABLET, FILM COATED ORAL
Qty: 30 TAB | Refills: 2 | Status: SHIPPED | OUTPATIENT
Start: 2018-02-17 | End: 2018-03-21 | Stop reason: SINTOL

## 2018-02-17 RX ORDER — GLYBURIDE 5 MG/1
5 TABLET ORAL 2 TIMES DAILY WITH MEALS
Qty: 30 TAB | Refills: 2 | Status: SHIPPED | OUTPATIENT
Start: 2018-02-17 | End: 2018-03-21

## 2018-02-17 RX ORDER — FUROSEMIDE 20 MG/1
20 TABLET ORAL 2 TIMES DAILY
Qty: 60 TAB | Refills: 2 | Status: SHIPPED | OUTPATIENT
Start: 2018-02-17 | End: 2018-03-21 | Stop reason: SDUPTHER

## 2018-02-17 RX ORDER — LISINOPRIL 5 MG/1
5 TABLET ORAL DAILY
Qty: 30 TAB | Refills: 2 | Status: SHIPPED | OUTPATIENT
Start: 2018-02-18 | End: 2018-02-22

## 2018-02-17 RX ORDER — ATORVASTATIN CALCIUM 40 MG/1
40 TABLET, FILM COATED ORAL
Status: DISCONTINUED | OUTPATIENT
Start: 2018-02-17 | End: 2018-02-17 | Stop reason: HOSPADM

## 2018-02-17 RX ORDER — METOPROLOL SUCCINATE 100 MG/1
100 TABLET, EXTENDED RELEASE ORAL EVERY EVENING
Qty: 30 TAB | Refills: 2 | Status: SHIPPED | OUTPATIENT
Start: 2018-02-17 | End: 2018-03-21 | Stop reason: SDUPTHER

## 2018-02-17 RX ADMIN — REGADENOSON 0.4 MG: 0.08 INJECTION, SOLUTION INTRAVENOUS at 09:46

## 2018-02-17 RX ADMIN — LISINOPRIL 5 MG: 5 TABLET ORAL at 08:06

## 2018-02-17 RX ADMIN — GLYBURIDE 5 MG: 5 TABLET ORAL at 07:55

## 2018-02-17 RX ADMIN — SPIRONOLACTONE 12.5 MG: 25 TABLET, FILM COATED ORAL at 07:55

## 2018-02-17 RX ADMIN — FUROSEMIDE 20 MG: 20 TABLET ORAL at 05:47

## 2018-02-17 RX ADMIN — HEPARIN SODIUM 4000 UNITS: 1000 INJECTION, SOLUTION INTRAVENOUS; SUBCUTANEOUS at 12:05

## 2018-02-17 ASSESSMENT — PAIN SCALES - GENERAL
PAINLEVEL_OUTOF10: 0

## 2018-02-17 ASSESSMENT — ENCOUNTER SYMPTOMS
COUGH: 0
PALPITATIONS: 0
ORTHOPNEA: 0
CLAUDICATION: 0
SHORTNESS OF BREATH: 0
PND: 0

## 2018-02-17 NOTE — DISCHARGE INSTRUCTIONS
Discharge Instructions    Discharged to home by car with relative. Discharged via walking, hospital escort: Yes.  Special equipment needed: Not Applicable    Be sure to schedule a follow-up appointment with your primary care doctor or any specialists as instructed.     Discharge Plan:   Diet Plan: Discussed  Activity Level: Discussed  Confirmed Follow up Appointment: Appointment Scheduled  Confirmed Symptoms Management: Discussed  Medication Reconciliation Updated: Yes  Influenza Vaccine Indication: Patient Refuses    I understand that a diet low in cholesterol, fat, and sodium is recommended for good health. Unless I have been given specific instructions below for another diet, I accept this instruction as my diet prescription.   Other diet: Diabetic, we recommend less than 2 grams of sodium per day     Special Instructions:   HF Patient Discharge Instructions  · Monitor your weight daily, and maintain a weight chart, to track your weight changes.   · Activity as tolerated, unless your Doctor has ordered otherwise. Other activity order: as tolerated.  · Follow a low fat, low cholesterol, low salt diet unless instructed otherwise by your Doctor. Read the labels on the back of food products and track your intake of fat, cholesterol and salt.   · Fluid Restriction No. If a Fluid Restriction has been ordered by your Doctor, measure fluids with a measuring cup to ensure that you are not exceeding the restriction.   · No smoking.  · Oxygen No. If your Doctor has ordered that you wear Oxygen at home, it is important to wear it as ordered.  · Did you receive an explanation from staff on the importance of taking each of your medications and why it is necessary to keep taking them unless your doctor says to stop? Yes  · Were all of your questions answered about how to manage your heart failure and what to do if you have increased signs and symptoms after you go home? Yes  · Do you feel like your heart failure care team  involved you in the care treatment plan and allowed you to make decisions regarding your care while in the hospital and addressed any discharge needs you might have? Yes    See the educational handout provided at discharge for more information on monitoring your daily weight, activity and diet. This also explains more about Heart Failure, symptoms of a flare-up and some of the tests that you have undergone.     Warning Signs of a Flare-Up include:  · Swelling in the ankles or lower legs.  · Shortness of breath, while at rest, or while doing normal activities.   · Shortness of breath at night when in bed, or coughing in bed.   · Requiring more pillows to sleep at night, or needing to sit up at night to sleep.  · Feeling weak, dizzy or fatigued.     When to call your Doctor:  · Call [facility] seven days a week from 8:00 a.m. to 8:00 p.m. for medical questions [phone].  · Call your Primary Care Physician or Cardiologist if:   1. You experience any pain radiating to your jaw or neck.  2. You have any difficulty breathing.  3. You experience weight gain of 3 lbs in a day or 5 lbs in a week.   4. You feel any palpitations or irregular heartbeats.  5. You become dizzy or lose consciousness.   If you have had an angiogram or had a pacemaker or AICD placed, and experience:  1. Bleeding, drainage or swelling at the surgical / puncture site.  2. Fever greater than 100.0 F  3. Shock from internal defibrillator.  4. Cool and / or numb extremities.      · Is patient discharged on Warfarin / Coumadin?   No     Depression / Suicide Risk    As you are discharged from this [facility], it is important to learn how to keep safe from harming yourself.    Recognize the warning signs:  · Abrupt changes in personality, positive or negative- including increase in energy   · Giving away possessions  · Change in eating patterns- significant weight changes-  positive or negative  · Change in sleeping patterns-  unable to sleep or sleeping all the time   · Unwillingness or inability to communicate  · Depression  · Unusual sadness, discouragement and loneliness  · Talk of wanting to die  · Neglect of personal appearance   · Rebelliousness- reckless behavior  · Withdrawal from people/activities they love  · Confusion- inability to concentrate     If you or a loved one observes any of these behaviors or has concerns about self-harm, here's what you can do:  · Talk about it- your feelings and reasons for harming yourself  · Remove any means that you might use to hurt yourself (examples: pills, rope, extension cords, firearm)  · Get professional help from the community (Mental Health, Substance Abuse, psychological counseling)  · Do not be alone:Call your Safe Contact- someone whom you trust who will be there for you.  · Call your local CRISIS HOTLINE 813-1037 or 055-066-7115  · Call your local Children's Mobile Crisis Response Team [place] (478) 349-6091 or www.[employer]  · Call the toll free National Suicide Prevention Hotlines   · National Suicide Prevention Lifeline 264-851-QERI (0434)  · National Hope Line Network 800-SUICIDE (797-8015)      Pulmonary Embolism  A pulmonary (lung) embolism (PE) is a blood clot that has traveled to the lung and results in a blockage of blood flow in the affected lung. Most clots come from deep veins in the legs or pelvis. PE is a dangerous and potentially life-threatening condition that can be treated if identified.  CAUSES  Blood clots form in a vein for different reasons. Usually several things cause blood clots. They include:  · The flow of blood slows down.  · The inside of the vein is damaged in some way.  · The person has a condition that makes the blood clot more easily.  RISK FACTORS  Some people are more likely than others to develop PE. Risk factors include:   · Smoking.  · Being overweight (obese).  · Sitting or lying still for a long time. This includes long-distance  travel, paralysis, or recovery from an illness or surgery.  Other factors that increase risk are:   · Older age, especially over 75 years of age.  · Having a family history of blood clots or if you have already had a blood clot.  · Having major or lengthy surgery. This is especially true for surgery on the hip, knee, or belly (abdomen). Hip surgery is particularly high risk.  · Having a long, thin tube (catheter) placed inside a vein during a medical procedure.  · Breaking a hip or leg.  · Having cancer or cancer treatment.  · Medicines containing the female hormone estrogen. This includes birth control pills and hormone replacement therapy.  · Other circulation or heart problems.  · Pregnancy and childbirth.  ¨ Hormone changes make the blood clot more easily during pregnancy.  ¨ The fetus puts pressure on the veins of the pelvis.  ¨ There is a risk of injury to veins during delivery or a caesarean delivery. The risk is highest just after childbirth.    PREVENTION   · Exercise the legs regularly. Take a brisk 30 minute walk every day.  · Maintain a weight that is appropriate for your height.  · Avoid sitting or lying in bed for long periods of time without moving your legs.  · Women, particularly those over the age of 35 years, should consider the risks and benefits of taking estrogen medicines, including birth control pills.  · Do not smoke, especially if you take estrogen medicines.  · Long-distance travel can increase your risk. You should exercise your legs by walking or pumping the muscles every hour.  · Many of the risk factors above relate to situations that exist with hospitalization, either for illness, injury, or elective surgery. Prevention may include medical and nonmedical measures.    ¨ Your health care provider will assess you for the need for venous thromboembolism prevention when you are admitted to the hospital. If you are having surgery, your surgeon will assess you the day of or day after  surgery.     SYMPTOMS   The symptoms of a PE usually start suddenly and include:  · Shortness of breath.  · Coughing.  · Coughing up blood or blood-tinged mucus.  · Chest pain. Pain is often worse with deep breaths.  · Rapid heartbeat.  DIAGNOSIS   Your health care provider will take a medical history, perform a physical exam, and use rule-out criteria to assess your risk for PE. If your risk is intermediate or high, other tests may be done. These include:  · Blood tests, such as studies of the clotting properties of your blood.  · Imaging tests, such as ultrasound, CT, MRI, and other tests to see if you have clots in your legs or lungs.  · An electrocardiogram. This can look for heart strain from blood clots in the lungs.  TREATMENT   · The most common treatment for a PE is blood thinning (anticoagulant) medicine, which reduces the blood's tendency to clot. Anticoagulants can stop new blood clots from forming and old clots from growing. They cannot dissolve existing clots. Your body does this by itself over time. Anticoagulants can be given by mouth, through an intravenous (IV) tube, or by injection. Your health care provider will determine the best program for you.  · Less commonly, clot-dissolving medicines (thrombolytics) are used to dissolve a PE. They carry a high risk of bleeding, so they are used mainly in severe cases.  · Very rarely, a blood clot in the leg needs to be removed surgically.  · If you are unable to take anticoagulants, your health care provider may arrange for you to have a filter placed in a main vein in your abdomen. This filter prevents clots from traveling to your lungs.  HOME CARE INSTRUCTIONS   · Take all medicines as directed by your health care provider.  · Learn as much as you can about DVT.  · Wear a medical alert bracelet or carry a medical alert card.  · Ask your health care provider how soon you can go back to normal activities. It is important to stay active to prevent blood  clots. If you are on anticoagulant medicine, avoid contact sports.  · It is very important to exercise. This is especially important while traveling, sitting, or standing for long periods of time. Exercise your legs by walking or by tightening and relaxing your leg muscles regularly. Take frequent walks.  · You may need to wear compression stockings. These are tight elastic stockings that apply pressure to the lower legs. This pressure can help keep the blood in the legs from clotting.  Taking Warfarin  Warfarin is a daily medicine that is taken by mouth. Your health care provider will advise you on the length of treatment (usually 3-6 months, sometimes lifelong). If you take warfarin:  · Understand how to take warfarin and foods that can affect how warfarin works in your body.  · Too much and too little warfarin are both dangerous. Too much warfarin increases the risk of bleeding. Too little warfarin continues to allow the risk for blood clots.  Warfarin and Regular Blood Testing  While taking warfarin, you will need to have regular blood tests to measure your blood clotting time. These blood tests usually include both the prothrombin time (PT) and international normalized ratio (INR) tests. The PT and INR results allow your health care provider to adjust your dose of warfarin. It is very important that you have your PT and INR tested as often as directed by your health care provider.   Warfarin and Your Diet  Avoid major changes in your diet, or notify your health care provider before changing your diet. Arrange a visit with a registered dietitian to answer your questions. Many foods, especially foods high in vitamin K, can interfere with warfarin and affect the PT and INR results. You should eat a consistent amount of foods high in vitamin K. Foods high in vitamin K include:   · Spinach, kale, broccoli, cabbage, diann and turnip greens, [place] sprouts, peas, cauliflower, seaweed, and parsley.  · Beef and pork  liver.  · Green tea.  · Soybean oil.  Warfarin with Other Medicines  Many medicines can interfere with warfarin and affect the PT and INR results. You must:  · Tell your health care provider about any and all medicines, vitamins, and supplements you take, including aspirin and other over-the-counter anti-inflammatory medicines. Be especially cautious with aspirin and anti-inflammatory medicines. Ask your health care provider before taking these.  · Do not take or discontinue any prescribed or over-the-counter medicine except on the advice of your health care provider or pharmacist.  Warfarin Side Effects  Warfarin can have side effects, such as easy bruising and difficulty stopping bleeding. Ask your health care provider or pharmacist about other side effects of warfarin. You will need to:  · Hold pressure over cuts for longer than usual.  · Notify your dentist and other health care providers that you are taking warfarin before you undergo any procedures where bleeding may occur.  Warfarin with Alcohol and Tobacco   · Drinking alcohol frequently can increase the effect of warfarin, leading to excess bleeding. It is best to avoid alcoholic drinks or consume only very small amounts while taking warfarin. Notify your health care provider if you change your alcohol intake.  · Do not use any tobacco products including cigarettes, chewing tobacco, or electronic cigarettes. If you smoke, quit. Ask your health care provider for help with quitting smoking.  Alternative Medicines to Warfarin: Factor Xa Inhibitor Medicines  · These blood thinning medicines are taken by mouth, usually for several weeks or longer. It is important to take the medicine every single day, at the same time each day.  · There are no regular blood tests required when using these medicines.  · There are fewer food and drug interactions than with warfarin.  · The side effects of this class of medicine is similar to that of warfarin, including excessive  bruising or bleeding. Ask your health care provider or pharmacist about other potential side effects.  SEEK MEDICAL CARE IF:   · You notice a rapid heartbeat.  · You feel weaker or more tired than usual.  · You feel faint.  · You notice increased bruising.  · Your symptoms are not getting better in the time expected.  · You are having side effects of medicine.  SEEK IMMEDIATE MEDICAL CARE IF:   · You have chest pain.  · You have trouble breathing.  · You have new or increased swelling or pain in one leg.  · You cough up blood.  · You notice blood in vomit, in a bowel movement, or in urine.  · You have a fever.  Symptoms of PE may represent a serious problem that is an emergency. Do not wait to see if the symptoms will go away. Get medical help right away. Call your local emergency services (911 in the [place]). Do not drive yourself to the hospital.       This information is not intended to replace advice given to you by your health care provider. Make sure you discuss any questions you have with your health care provider.     Document Released: 12/15/2001 Document Revised: 01/08/2016 Document Reviewed: 04/13/2016  [employer] Interactive Patient Education ©2016 [employer]      Deep Vein Thrombosis  A deep vein thrombosis (DVT) is a blood clot that develops in the deep, larger veins of the leg, arm, or pelvis. These are more dangerous than clots that might form in veins near the surface of the body. A DVT can lead to serious and even life-threatening complications if the clot breaks off and travels in the bloodstream to the lungs.   A DVT can damage the valves in your leg veins so that instead of flowing upward, the blood pools in the lower leg. This is called post-thrombotic syndrome, and it can result in pain, swelling, discoloration, and sores on the leg.  CAUSES  Usually, several things contribute to the formation of blood clots. Contributing factors include:  · The flow of blood slows down.  · The inside  of the vein is damaged in some way.  · You have a condition that makes blood clot more easily.  RISK FACTORS  Some people are more likely than others to develop blood clots. Risk factors include:   · Smoking.  · Being overweight (obese).  · Sitting or lying still for a long time. This includes long-distance travel, paralysis, or recovery from an illness or surgery.  Other factors that increase risk are:   · Older age, especially over 75 years of age.  · Having a family history of blood clots or if you have already had a blot clot.  · Having major or lengthy surgery. This is especially true for surgery on the hip, knee, or belly (abdomen). Hip surgery is particularly high risk.  · Having a long, thin tube (catheter) placed inside a vein during a medical procedure.  · Breaking a hip or leg.  · Having cancer or cancer treatment.  · Pregnancy and childbirth.  ¨ Hormone changes make the blood clot more easily during pregnancy.  ¨ The fetus puts pressure on the veins of the pelvis.  ¨ There is a risk of injury to veins during delivery or a caesarean delivery. The risk is highest just after childbirth.  · Medicines containing the female hormone estrogen. This includes birth control pills and hormone replacement therapy.  · Other circulation or heart problems.    SIGNS AND SYMPTOMS  When a clot forms, it can either partially or totally block the blood flow in that vein. Symptoms of a DVT can include:  · Swelling of the leg or arm, especially if one side is much worse.  · Warmth and redness of the leg or arm, especially if one side is much worse.  · Pain in an arm or leg. If the clot is in the leg, symptoms may be more noticeable or worse when standing or walking.  The symptoms of a DVT that has traveled to the lungs (pulmonary embolism, PE) usually start suddenly and include:  · Shortness of breath.  · Coughing.  · Coughing up blood or blood-tinged mucus.  · Chest pain. The chest pain is often worse with deep  breaths.  · Rapid heartbeat.  Anyone with these symptoms should get emergency medical treatment right away. Do not wait to see if the symptoms will go away. Call your local emergency services (911 in the [place] if you have these symptoms. Do not drive yourself to the hospital.  DIAGNOSIS  If a DVT is suspected, your health care provider will take a full medical history and perform a physical exam. Tests that also may be required include:  · Blood tests, including studies of the clotting properties of the blood.  · Ultrasound to see if you have clots in your legs or lungs.  · X-rays to show the flow of blood when dye is injected into the veins (venogram).  · Studies of your lungs if you have any chest symptoms.  PREVENTION  · Exercise the legs regularly. Take a brisk 30-minute walk every day.  · Maintain a weight that is appropriate for your height.  · Avoid sitting or lying in bed for long periods of time without moving your legs.  · Women, particularly those over the age of 35 years, should consider the risks and benefits of taking estrogen medicines, including birth control pills.  · Do not smoke, especially if you take estrogen medicines.  · Long-distance travel can increase your risk of DVT. You should exercise your legs by walking or pumping the muscles every hour.  · Many of the risk factors above relate to situations that exist with hospitalization, either for illness, injury, or elective surgery. Prevention may include medical and nonmedical measures.  ¨ Your health care provider will assess you for the need for venous thromboembolism prevention when you are admitted to the hospital. If you are having surgery, your surgeon will assess you the day of or day after surgery.  TREATMENT  Once identified, a DVT can be treated. It can also be prevented in some circumstances. Once you have had a DVT, you may be at increased risk for a DVT in the future. The most common treatment for DVT is blood-thinning  (anticoagulant) medicine, which reduces the blood's tendency to clot. Anticoagulants can stop new blood clots from forming and stop old clots from growing. They cannot dissolve existing clots. Your body does this by itself over time. Anticoagulants can be given by mouth, through an IV tube, or by injection. Your health care provider will determine the best program for you. Other medicines or treatments that may be used are:  · Heparin or related medicines (low molecular weight heparin) are often the first treatment for a blood clot. They act quickly. However, they cannot be taken orally and must be given either in shot form or by IV tube.  ¨ Heparin can cause a fall in a component of blood that stops bleeding and forms blood clots (platelets). You will be monitored with blood tests to be sure this does not occur.  · Warfarin is an anticoagulant that can be swallowed. It takes a few days to start working, so usually heparin or related medicines are used in combination. Once warfarin is working, heparin is usually stopped.  · Factor Xa inhibitor medicines, such as rivaroxaban and apixaban, also reduce blood clotting. These medicines are taken orally and can often be used without heparin or related medicines.  · Less commonly, clot dissolving drugs (thrombolytics) are used to dissolve a DVT. They carry a high risk of bleeding, so they are used mainly in severe cases where your life or a part of your body is threatened.  · Very rarely, a blood clot in the leg needs to be removed surgically.  · If you are unable to take anticoagulants, your health care provider may arrange for you to have a filter placed in a main vein in your abdomen. This filter prevents clots from traveling to your lungs.  HOME CARE INSTRUCTIONS  · Take all medicines as directed by your health care provider.  · Learn as much as you can about DVT.  · Wear a medical alert bracelet or carry a medical alert card.  · Ask your health care provider how soon  you can go back to normal activities. It is important to stay active to prevent blood clots. If you are on anticoagulant medicine, avoid contact sports.  · It is very important to exercise. This is especially important while traveling, sitting, or standing for long periods of time. Exercise your legs by walking or by tightening and relaxing your leg muscles regularly. Take frequent walks.  · You may need to wear compression stockings. These are tight elastic stockings that apply pressure to the lower legs. This pressure can help keep the blood in the legs from clotting.  Taking Warfarin  Warfarin is a daily medicine that is taken by mouth. Your health care provider will advise you on the length of treatment (usually 3-6 months, sometimes lifelong). If you take warfarin:  · Understand how to take warfarin and foods that can affect how warfarin works in your body.  · Too much and too little warfarin are both dangerous. Too much warfarin increases the risk of bleeding. Too little warfarin continues to allow the risk for blood clots.  Warfarin and Regular Blood Testing  While taking warfarin, you will need to have regular blood tests to measure your blood clotting time. These blood tests usually include both the prothrombin time (PT) and international normalized ratio (INR) tests. The PT and INR results allow your health care provider to adjust your dose of warfarin. It is very important that you have your PT and INR tested as often as directed by your health care provider.     Warfarin and Your Diet  Avoid major changes in your diet, or notify your health care provider before changing your diet. Arrange a visit with a registered dietitian to answer your questions. Many foods, especially foods high in vitamin K, can interfere with warfarin and affect the PT and INR results. You should eat a consistent amount of foods high in vitamin K. Foods high in vitamin K include:   · Spinach, kale, broccoli, cabbage, diann and  turnip greens, [place] sprouts, peas, cauliflower, seaweed, and parsley.  · Beef and pork liver.  · Green tea.  · Soybean oil.  Warfarin with Other Medicines  Many medicines can interfere with warfarin and affect the PT and INR results. You must:  · Tell your health care provider about any and all medicines, vitamins, and supplements you take, including aspirin and other over-the-counter anti-inflammatory medicines. Be especially cautious with aspirin and anti-inflammatory medicines. Ask your health care provider before taking these.  · Do not take or discontinue any prescribed or over-the-counter medicine except on the advice of your health care provider or pharmacist.  Warfarin Side Effects  Warfarin can have side effects, such as easy bruising and difficulty stopping bleeding. Ask your health care provider or pharmacist about other side effects of warfarin. You will need to:  · Hold pressure over cuts for longer than usual.  · Notify your dentist and other health care providers that you are taking warfarin before you undergo any procedures where bleeding may occur.  Warfarin with Alcohol and Tobacco   · Drinking alcohol frequently can increase the effect of warfarin, leading to excess bleeding. It is best to avoid alcoholic drinks or to consume only very small amounts while taking warfarin. Notify your health care provider if you change your alcohol intake.    · Do not use any tobacco products including cigarettes, chewing tobacco, or electronic cigarettes. If you smoke, quit. Ask your health care provider for help with quitting smoking.  Alternative Medicines to Warfarin: Factor Xa Inhibitor Medicines  · These blood-thinning medicines are taken by mouth, usually for several weeks or longer. It is important to take the medicine every single day at the same time each day.  · There are no regular blood tests required when using these medicines.  · There are fewer food and drug interactions than with  warfarin.  · The side effects of this class of medicine are similar to those of warfarin, including excessive bruising or bleeding. Ask your health care provider or pharmacist about other potential side effects.  SEEK MEDICAL CARE IF:  · You notice a rapid heartbeat.  · You feel weaker or more tired than usual.  · You feel faint.  · You notice increased bruising.  · You feel your symptoms are not getting better in the time expected.  · You believe you are having side effects of medicine.  SEEK IMMEDIATE MEDICAL CARE IF:  · You have chest pain.  · You have trouble breathing.  · You have new or increased swelling or pain in one leg.  · You cough up blood.  · You notice blood in vomit, in a bowel movement, or in urine.  MAKE SURE YOU:  · Understand these instructions.  · Will watch your condition.  · Will get help right away if you are not doing well or get worse.     This information is not intended to replace advice given to you by your health care provider. Make sure you discuss any questions you have with your health care provider.     Document Released: 12/18/2006 Document Revised: 01/08/2016 Document Reviewed: 04/13/2016  [employer] Interactive Patient Education ©2016 [employer]      Heart Failure  Heart failure means your heart has trouble pumping blood. This makes it hard for your body to work well. Heart failure is usually a long-term (chronic) condition. You must take good care of yourself and follow your doctor's treatment plan.  HOME CARE  · Take your heart medicine as told by your doctor.  ¨ Do not stop taking medicine unless your doctor tells you to.  ¨ Do not skip any dose of medicine.  ¨ Refill your medicines before they run out.  ¨ Take other medicines only as told by your doctor or pharmacist.  · Stay active if told by your doctor. The elderly and people with severe heart failure should talk with a doctor about physical activity.  · Eat heart-healthy foods. Choose foods that are without trans fat and  are low in saturated fat, cholesterol, and salt (sodium). This includes fresh or frozen fruits and vegetables, fish, lean meats, fat-free or low-fat dairy foods, whole grains, and high-fiber foods. Lentils and dried peas and beans (legumes) are also good choices.  · Limit salt if told by your doctor.  · Cook in a healthy way. Roast, grill, broil, bake, poach, steam, or stir-calvo foods.  · Limit fluids as told by your doctor.  · Weigh yourself every morning. Do this after you pee (urinate) and before you eat breakfast. Write down your weight to give to your doctor.  · Take your blood pressure and write it down if your doctor tells you to.  · Ask your doctor how to check your pulse. Check your pulse as told.  · Lose weight if told by your doctor.  · Stop smoking or chewing tobacco. Do not use gum or patches that help you quit without your doctor's approval.  · Schedule and go to doctor visits as told.  · Nonpregnant women should have no more than 1 drink a day. Men should have no more than 2 drinks a day. Talk to your doctor about drinking alcohol.  · Stop illegal drug use.  · Stay current with shots (immunizations).  · Manage your health conditions as told by your doctor.  · Learn to manage your stress.  · Rest when you are tired.  · If it is really hot outside:  ¨ Avoid intense activities.  ¨ Use air conditioning or fans, or get in a cooler place.  ¨ Avoid caffeine and alcohol.  ¨ Wear loose-fitting, lightweight, and light-colored clothing.  · If it is really cold outside:  ¨ Avoid intense activities.  ¨ Layer your clothing.  ¨ Wear mittens or gloves, a hat, and a scarf when going outside.  ¨ Avoid alcohol.  · Learn about heart failure and get support as needed.  · Get help to maintain or improve your quality of life and your ability to care for yourself as needed.  GET HELP IF:   · You gain weight quickly.  · You are more short of breath than usual.  · You cannot do your normal activities.  · You tire easily.  · You  cough more than normal, especially with activity.  · You have any or more puffiness (swelling) in areas such as your hands, feet, ankles, or belly (abdomen).  · You cannot sleep because it is hard to breathe.  · You feel like your heart is beating fast (palpitations).  · You get dizzy or light-headed when you stand up.  GET HELP RIGHT AWAY IF:   · You have trouble breathing.  · There is a change in mental status, such as becoming less alert or not being able to focus.  · You have chest pain or discomfort.  · You faint.  MAKE SURE YOU:   · Understand these instructions.  · Will watch your condition.  · Will get help right away if you are not doing well or get worse.     This information is not intended to replace advice given to you by your health care provider. Make sure you discuss any questions you have with your health care provider.     Document Released: 09/26/2009 Document Revised: 01/08/2016 Document Reviewed: 02/03/2014  [employer] Interactive Patient Education ©2016 [employer]    Apixaban oral tablets  What is this medicine?  APIXABAN is an anticoagulant (blood thinner). It is used to lower the chance of stroke in people with a medical condition called atrial fibrillation. It is also used after knee or hip surgeries to prevent blood clots.  This medicine may be used for other purposes; ask your health care provider or pharmacist if you have questions.  COMMON BRAND NAME(S): Eliquis  What should I tell my health care provider before I take this medicine?  They need to know if you have any of these conditions:  -bleeding disorders  -bleeding in the brain  -blood in your stools (black or tarry stools) or if you have blood in your vomit  -history of stomach bleeding  -kidney disease  -liver disease  -mechanical heart valve  -an unusual or allergic reaction to apixaban, other medicines, foods, dyes, or preservatives  -pregnant or trying to get pregnant  -breast-feeding  How should I use this medicine?  Take  this medicine by mouth with a glass of water. Follow the directions on the prescription label. You can take it with or without food. If it upsets your stomach, take it with food. Take your medicine at regular intervals. Do not take it more often than directed. Do not stop taking except on your doctor's advice.  Talk to your pediatrician regarding the use of this medicine in children. Special care may be needed.  Overdosage: If you think you've taken too much of this medicine contact a poison control center or emergency room at once.  Overdosage: If you think you have taken too much of this medicine contact a poison control center or emergency room at once.  NOTE: This medicine is only for you. Do not share this medicine with others.  What if I miss a dose?  If you miss a dose, take it as soon as you can. If it is almost time for your next dose, take only that dose. Do not take double or extra doses.  What may interact with this medicine?  This medicine may interact with the following:  -aspirin and aspirin-like medicines  -certain medicines for fungal infections like ketoconazole and itraconazole  -certain medicines for seizures like carbamazepine and phenytoin  -certain medicines that treat or prevent blood clots like warfarin, enoxaparin, and dalteparin  -clarithromycin  -NSAIDs, medicines for pain and inflammation, like ibuprofen or naproxen  -rifampin  -ritonavir  -[place]'s wort  This list may not describe all possible interactions. Give your health care provider a list of all the medicines, herbs, non-prescription drugs, or dietary supplements you use. Also tell them if you smoke, drink alcohol, or use illegal drugs. Some items may interact with your medicine.  What should I watch for while using this medicine?  Do not stop taking this medicine without first talking to your doctor. Stopping this medicine may increase your risk of having a stroke. Be sure to refill your prescription before you run out of  medicine.  This medicine may increase your risk to bruise or bleed. Call your doctor or health care professional if you notice any unusual bleeding.  Be careful brushing and flossing your teeth or using a toothpick because you may bleed more easily. If you have any dental work done, tell your dentist you are receiving this medicine.  What side effects may I notice from receiving this medicine?  Side effects that you should report to your doctor or health care professional as soon as possible:  -allergic reactions like skin rash, itching or hives, swelling of the face, lips, or tongue  -bloody or black, tarry stools  -changes in vision  -confusion, trouble speaking or understanding  -red or dark-brown urine  -red spots on the skin  -severe headaches  -spitting up blood or brown material that looks like coffee grounds  -sudden numbness or weakness of the face, arm or leg  -trouble walking, dizziness, loss of balance or coordination  -unusual bruising or bleeding from the eye, gums, or nose  This list may not describe all possible side effects. Call your doctor for medical advice about side effects. You may report side effects to FDA at 3-158-FDA-4251.  Where should I keep my medicine?  Keep out of the reach of children.  Store at room temperature between 20 and 25 degrees C (68 and 77 degrees F). Throw away any unused medicine after the expiration date.  NOTE: This sheet is a summary. It may not cover all possible information. If you have questions about this medicine, talk to your doctor, pharmacist, or health care provider.  © 2014, [employer]. (3/17/2014 3:30:42 PM)    Metformin tablets  What is this medicine?  METFORMIN (met FOR min) is used to treat type 2 diabetes. It helps to control blood sugar. Treatment is combined with diet and exercise. This medicine can be used alone or with other medicines for diabetes.  This medicine may be used for other purposes; ask your health care provider or pharmacist  if you have questions.  COMMON BRAND NAME(S): Glucophage  What should I tell my health care provider before I take this medicine?  They need to know if you have any of these conditions:  -anemia  -frequently drink alcohol-containing beverages  -become easily dehydrated  -heart attack  -heart failure that is treated with medications  -kidney disease  -liver disease  -polycystic ovary syndrome  -serious infection or injury  -vomiting  -an unusual or allergic reaction to metformin, other medicines, foods, dyes, or preservatives  -pregnant or trying to get pregnant  -breast-feeding  How should I use this medicine?  Take this medicine by mouth. Take it with meals. Swallow the tablets with a drink of water. Follow the directions on the prescription label. Take your medicine at regular intervals. Do not take your medicine more often than directed.  Talk to your pediatrician regarding the use of this medicine in children. While this drug may be prescribed for children as young as 10 years of age for selected conditions, precautions do apply.  Overdosage: If you think you have taken too much of this medicine contact a poison control center or emergency room at once.  NOTE: This medicine is only for you. Do not share this medicine with others.  What if I miss a dose?  If you miss a dose, take it as soon as you can. If it is almost time for your next dose, take only that dose. Do not take double or extra doses.  What may interact with this medicine?  Do not take this medicine with any of the following medications:  -dofetilide  -gatifloxacin  -certain contrast medicines given before X-rays, CT scans, MRI, or other procedures  This medicine may also interact with the following medications:  -digoxin  -diuretics  -female hormones, like estrogens or progestins and birth control pills  -isoniazid  -medicines for blood pressure, heart disease, irregular heart beat  -morphine  -nicotinic acid  -phenothiazines like chlorpromazine,  mesoridazine, prochlorperazine, thioridazine  -phenytoin  -procainamide  -quinidine  -quinine  -ranitidine  -steroid medicines like prednisone or cortisone  -stimulant medicines for attention disorders, weight loss, or to stay awake  -thyroid medicines  -trimethoprim  -vancomycin  This list may not describe all possible interactions. Give your health care provider a list of all the medicines, herbs, non-prescription drugs, or dietary supplements you use. Also tell them if you smoke, drink alcohol, or use illegal drugs. Some items may interact with your medicine.  What should I watch for while using this medicine?  Visit your doctor or health care professional for regular checks on your progress.  A test called the HbA1C (A1C) will be monitored. This is a simple blood test. It measures your blood sugar control over the last 2 to 3 months. You will receive this test every 3 to 6 months.  Learn how to check your blood sugar. Learn the symptoms of low and high blood sugar and how to manage them.  Always carry a quick-source of sugar with you in case you have symptoms of low blood sugar. Examples include hard sugar candy or glucose tablets. Make sure others know that you can choke if you eat or drink when you develop serious symptoms of low blood sugar, such as seizures or unconsciousness. They must get medical help at once.  Tell your doctor or health care professional if you have high blood sugar. You might need to change the dose of your medicine. If you are sick or exercising more than usual, you might need to change the dose of your medicine.  Do not skip meals. Ask your doctor or health care professional if you should avoid alcohol. Many nonprescription cough and cold products contain sugar or alcohol. These can affect blood sugar.  This medicine may cause ovulation in premenopausal women who do not have regular monthly periods. This may increase your chances of becoming pregnant. You should not take this medicine  if you become pregnant or think you may be pregnant. Talk with your doctor or health care professional about your birth control options while taking this medicine. Contact your doctor or health care professional right away if think you are pregnant.  If you are going to need surgery, a MRI, CT scan, or other procedure, tell your doctor that you are taking this medicine. You may need to stop taking this medicine before the procedure.  Wear a medical ID bracelet or chain, and carry a card that describes your disease and details of your medicine and dosage times.  What side effects may I notice from receiving this medicine?  Side effects that you should report to your doctor or health care professional as soon as possible:  -allergic reactions like skin rash, itching or hives, swelling of the face, lips, or tongue  -breathing problems  -feeling faint or lightheaded, falls  -muscle aches or pains  -signs and symptoms of low blood sugar such as feeling anxious, confusion, dizziness, increased hunger, unusually weak or tired, sweating, shakiness, cold, irritable, headache, blurred vision, fast heartbeat, loss of consciousness  -slow or irregular heartbeat  -unusual stomach pain or discomfort  -unusually tired or weak  Side effects that usually do not require medical attention (report to your doctor or health care professional if they continue or are bothersome):  -diarrhea  -headache  -heartburn  -metallic taste in mouth  -nausea  -stomach gas, upset  This list may not describe all possible side effects. Call your doctor for medical advice about side effects. You may report side effects to FDA at 0-966-FDA-4391.  Where should I keep my medicine?  Keep out of the reach of children.  Store at room temperature between 15 and 30 degrees C (59 and 86 degrees F). Protect from moisture and light. Throw away any unused medicine after the expiration date.  NOTE: This sheet is a summary. It may not cover all possible information.  If you have questions about this medicine, talk to your doctor, pharmacist, or health care provider.  © 2014, [employer]. (4/1/2014 4:03:44 PM)    Furosemide tablets  What is this medicine?  FUROSEMIDE (fyoor [place] se mide) is a diuretic. It helps you make more urine and to lose salt and excess water from your body. This medicine is used to treat high blood pressure, and edema or swelling from heart, kidney, or liver disease.  This medicine may be used for other purposes; ask your health care provider or pharmacist if you have questions.  COMMON BRAND NAME(S): Dash Aviles  What should I tell my health care provider before I take this medicine?  They need to know if you have any of these conditions:  -abnormal blood electrolytes  -diarrhea or vomiting  -gout  -heart disease  -kidney disease, small amounts of urine, or difficulty passing urine  -liver disease  -an unusual or allergic reaction to furosemide, sulfa drugs, other medicines, foods, dyes, or preservatives  -pregnant or trying to get pregnant  -breast-feeding  How should I use this medicine?  Take this medicine by mouth with a glass of water. Follow the directions on the prescription label. You may take this medicine with or without food. If it upsets your stomach, take it with food or milk. Do not take your medicine more often than directed. Remember that you will need to pass more urine after taking this medicine. Do not take your medicine at a time of day that will cause you problems. Do not take at bedtime.  Talk to your pediatrician regarding the use of this medicine in children. While this drug may be prescribed for selected conditions, precautions do apply.  Overdosage: If you think you have taken too much of this medicine contact a poison control center or emergency room at once.  NOTE: This medicine is only for you. Do not share this medicine with others.  What if I miss a dose?  If you miss a dose, take it as soon as you can. If it is  almost time for your next dose, take only that dose. Do not take double or extra doses.  What may interact with this medicine?  -aspirin and aspirin-like medicines  -certain antibiotics  -chloral hydrate  -cisplatin  -cyclosporine  -digoxin  -diuretics  -laxatives  -lithium  -medicines for blood pressure  -medicines that relax muscles for surgery  -methotrexate  -NSAIDs, medicines for pain and inflammation like ibuprofen, naproxen, or indomethacin  -phenytoin  -steroid medicines like prednisone or cortisone  -sucralfate  This list may not describe all possible interactions. Give your health care provider a list of all the medicines, herbs, non-prescription drugs, or dietary supplements you use. Also tell them if you smoke, drink alcohol, or use illegal drugs. Some items may interact with your medicine.  What should I watch for while using this medicine?  Visit your doctor or health care professional for regular checks on your progress. Check your blood pressure regularly. Ask your doctor or health care professional what your blood pressure should be, and when you should contact him or her. If you are a diabetic, check your blood sugar as directed.  You may need to be on a special diet while taking this medicine. Check with your doctor. Also, ask how many glasses of fluid you need to drink a day. You must not get dehydrated.  You may get drowsy or dizzy. Do not drive, use machinery, or do anything that needs mental alertness until you know how this drug affects you. Do not stand or sit up quickly, especially if you are an older patient. This reduces the risk of dizzy or fainting spells. Alcohol can make you more drowsy and dizzy. Avoid alcoholic drinks.  This medicine can make you more sensitive to the sun. Keep out of the sun. If you cannot avoid being in the sun, wear protective clothing and use sunscreen. Do not use sun lamps or tanning beds/booths.  What side effects may I notice from receiving this  medicine?  Side effects that you should report to your doctor or health care professional as soon as possible:  -blood in urine or stools  -dry mouth  -fever or chills  -hearing loss or ringing in the ears  -irregular heartbeat  -muscle pain or weakness, cramps  -skin rash  -stomach upset, pain, or nausea  -tingling or numbness in the hands or feet  -unusually weak or tired  -vomiting or diarrhea  -yellowing of the eyes or skin  Side effects that usually do not require medical attention (report to your doctor or health care professional if they continue or are bothersome):  -headache  -loss of appetite  -unusual bleeding or bruising  This list may not describe all possible side effects. Call your doctor for medical advice about side effects. You may report side effects to FDA at 0-846-FDA-1294.  Where should I keep my medicine?  Keep out of the reach of children.  Store at room temperature between 15 and 30 degrees C (59 and 86 degrees F). Protect from light. Throw away any unused medicine after the expiration date.  NOTE: This sheet is a summary. It may not cover all possible information. If you have questions about this medicine, talk to your doctor, pharmacist, or health care provider.  © 2014, [employer]. (12/6/2010 4:24:50 PM)    Atorvastatin tablets  What is this medicine?  ATORVASTATIN (a TORE va sta tin) is known as a HMG-CoA reductase inhibitor or 'statin'. It lowers the level of cholesterol and triglycerides in the blood. This drug may also reduce the risk of heart attack, stroke, or other health problems in patients with risk factors for heart disease. Diet and lifestyle changes are often used with this drug.  This medicine may be used for other purposes; ask your health care provider or pharmacist if you have questions.  COMMON BRAND NAME(S): Lipitor  What should I tell my health care provider before I take this medicine?  They need to know if you have any of these conditions:  -frequently  drink alcoholic beverages  -history of stroke, TIA  -kidney disease  -liver disease  -muscle aches or weakness  -other medical condition  -an unusual or allergic reaction to atorvastatin, other medicines, foods, dyes, or preservatives  -pregnant or trying to get pregnant  -breast-feeding  How should I use this medicine?  Take this medicine by mouth with a glass of water. Follow the directions on the prescription label. You can take this medicine with or without food. Take your doses at regular intervals. Do not take your medicine more often than directed.  Talk to your pediatrician regarding the use of this medicine in children. While this drug may be prescribed for children as young as 10 years old for selected conditions, precautions do apply.  Overdosage: If you think you have taken too much of this medicine contact a poison control center or emergency room at once.  NOTE: This medicine is only for you. Do not share this medicine with others.  What if I miss a dose?  If you miss a dose, take it as soon as you can. If it is almost time for your next dose, take only that dose. Do not take double or extra doses.  What may interact with this medicine?  Do not take this medicine with any of the following medications:  -red yeast rice  -telaprevir  -telithromycin  -voriconazole  This medicine may also interact with the following medications:  -alcohol  -antiviral medicines for HIV or AIDS  -boceprevir  -certain antibiotics like clarithromycin, erythromycin, troleandomycin  -certain medicines for cholesterol like fenofibrate or gemfibrozil  -cimetidine  -clarithromycin  -colchicine  -cyclosporine  -digoxin  -female hormones, like estrogens or progestins and birth control pills  -grapefruit juice  -medicines for fungal infections like fluconazole, itraconazole, ketoconazole  -niacin  -rifampin  -spironolactone  This list may not describe all possible interactions. Give your health care provider a list of all the  medicines, herbs, non-prescription drugs, or dietary supplements you use. Also tell them if you smoke, drink alcohol, or use illegal drugs. Some items may interact with your medicine.  What should I watch for while using this medicine?  Visit your doctor or health care professional for regular check-ups. You may need regular tests to make sure your liver is working properly.  Tell your doctor or health care professional right away if you get any unexplained muscle pain, tenderness, or weakness, especially if you also have a fever and tiredness. Your doctor or health care professional may tell you to stop taking this medicine if you develop muscle problems. If your muscle problems do not go away after stopping this medicine, contact your health care professional.  This drug is only part of a total heart-health program. Your doctor or a dietician can suggest a low-cholesterol and low-fat diet to help. Avoid alcohol and smoking, and keep a proper exercise schedule.  Do not use this drug if you are pregnant or breast-feeding. Serious side effects to an unborn child or to an infant are possible. Talk to your doctor or pharmacist for more information.  This medicine may affect blood sugar levels. If you have diabetes, check with your doctor or health care professional before you change your diet or the dose of your diabetic medicine.  If you are going to have surgery tell your health care professional that you are taking this drug.  What side effects may I notice from receiving this medicine?  Side effects that you should report to your doctor or health care professional as soon as possible:  -allergic reactions like skin rash, itching or hives, swelling of the face, lips, or tongue  -dark urine  -fever  -joint pain  -muscle cramps, pain  -redness, blistering, peeling or loosening of the skin, including inside the mouth  -trouble passing urine or change in the amount of urine  -unusually weak or tired  -yellowing of eyes  or skin  Side effects that usually do not require medical attention (report to your doctor or health care professional if they continue or are bothersome):  -constipation  -heartburn  -stomach gas, pain, upset  This list may not describe all possible side effects. Call your doctor for medical advice about side effects. You may report side effects to FDA at 6-402-ZIB-1123.  Where should I keep my medicine?  Keep out of the reach of children.  Store at room temperature between 20 to 25 degrees C (68 to 77 degrees F). Throw away any unused medicine after the expiration date.  NOTE: This sheet is a summary. It may not cover all possible information. If you have questions about this medicine, talk to your doctor, pharmacist, or health care provider.  © 2014, [employer]. (11/6/2012 9:18:24 AM)  Spironolactone tablets  What is this medicine?  SPIRONOLACTONE (yara on oh LAK tone) is a diuretic. It helps you make more urine and to lose excess water from your body. This medicine is used to treat high blood pressure, and edema or swelling from heart, kidney, or liver disease. It is also used to treat patients who make too much aldosterone or have low potassium.  This medicine may be used for other purposes; ask your health care provider or pharmacist if you have questions.  COMMON BRAND NAME(S): Aldactone  What should I tell my health care provider before I take this medicine?  They need to know if you have any of these conditions:  -high blood level of potassium  -kidney disease or trouble making urine  -liver disease  -an unusual or allergic reaction to spironolactone, other medicines, foods, dyes, or preservatives  -pregnant or trying to get pregnant  -breast-feeding  How should I use this medicine?  Take this medicine by mouth with a drink of water. Follow the directions on your prescription label. You can take it with or without food. If it upsets your stomach, take it with food. Do not take your medicine  more often than directed. Remember that you will need to pass more urine after taking this medicine. Do not take your doses at a time of day that will cause you problems. Do not take at bedtime.  Talk to your pediatrician regarding the use of this medicine in children. While this drug may be prescribed for selected conditions, precautions do apply.  Overdosage: If you think you have taken too much of this medicine contact a poison control center or emergency room at once.  NOTE: This medicine is only for you. Do not share this medicine with others.  What if I miss a dose?  If you miss a dose, take it as soon as you can. If it is almost time for your next dose, take only that dose. Do not take double or extra doses.  What may interact with this medicine?  Do not take this medicine with any of the following medications:  -eplerenone  This medicine may also interact with the following medications:  -corticosteroids  -digoxin  -lithium  -medicines for high blood pressure like ACE inhibitors  -skeletal muscle relaxants like tubocurarine  -NSAIDs, medicines for pain and inflammation, like ibuprofen or naproxen  -potassium products like salt substitute or supplements  -pressor amines like norepinephrine  -some diuretics  This list may not describe all possible interactions. Give your health care provider a list of all the medicines, herbs, non-prescription drugs, or dietary supplements you use. Also tell them if you smoke, drink alcohol, or use illegal drugs. Some items may interact with your medicine.  What should I watch for while using this medicine?  Visit your doctor or health care professional for regular checks on your progress. Check your blood pressure as directed. Ask your doctor what your blood pressure should be, and when you should contact them.  You may need to be on a special diet while taking this medicine. Ask your doctor. Also, ask how many glasses of fluid you need to drink a day. You must not get  dehydrated.  This medicine may make you feel confused, dizzy or lightheaded. Drinking alcohol and taking some medicines can make this worse. Do not drive, use machinery, or do anything that needs mental alertness until you know how this medicine affects you. Do not sit or stand up quickly.  What side effects may I notice from receiving this medicine?  Side effects that you should report to your doctor or health care professional as soon as possible:  -allergic reactions such as skin rash or itching, hives, swelling of the lips, mouth, tongue, or throat  -black or tarry stools  -fast, irregular heartbeat  -fever  -muscle pain, cramps  -numbness, tingling in hands or feet  -trouble breathing  -trouble passing urine  -unusual bleeding  -unusually weak or tired  Side effects that usually do not require medical attention (report to your doctor or health care professional if they continue or are bothersome):  -change in voice or hair growth  -confusion  -dizzy, drowsy  -dry mouth, increased thirst  -enlarged or tender breasts  -headache  -irregular menstrual periods  -sexual difficulty, unable to have an erection  -stomach upset  This list may not describe all possible side effects. Call your doctor for medical advice about side effects. You may report side effects to FDA at 7-111-FDA-9575.  Where should I keep my medicine?  Keep out of the reach of children.  Store below 25 degrees C (77 degrees F). Throw away any unused medicine after the expiration date.  NOTE: This sheet is a summary. It may not cover all possible information. If you have questions about this medicine, talk to your doctor, pharmacist, or health care provider.  © 2014, [employer]. (8/30/2011 12:51:30 PM)    Lisinopril tablets  What is this medicine?  LISINOPRIL (lyse IN oh pril) is an ACE inhibitor. This medicine is used to treat high blood pressure and heart failure. It is also used to protect the heart immediately after a heart  attack.  This medicine may be used for other purposes; ask your health care provider or pharmacist if you have questions.  COMMON BRAND NAME(S): Prinivil, Zestril  What should I tell my health care provider before I take this medicine?  They need to know if you have any of these conditions:  -diabetes  -heart or blood vessel disease  -immune system disease like lupus or scleroderma  -kidney disease  -low blood pressure  -previous swelling of the tongue, face, or lips with difficulty breathing, difficulty swallowing, hoarseness, or tightening of the throat  -an unusual or allergic reaction to lisinopril, other ACE inhibitors, insect venom, foods, dyes, or preservatives  -pregnant or trying to get pregnant  -breast-feeding  How should I use this medicine?  Take this medicine by mouth with a glass of water. Follow the directions on your prescription label. You may take this medicine with or without food. Take your medicine at regular intervals. Do not stop taking this medicine except on the advice of your doctor or health care professional.  Talk to your pediatrician regarding the use of this medicine in children. Special care may be needed. While this drug may be prescribed for children as young as 6 years of age for selected conditions, precautions do apply.  Overdosage: If you think you have taken too much of this medicine contact a poison control center or emergency room at once.  NOTE: This medicine is only for you. Do not share this medicine with others.  What if I miss a dose?  If you miss a dose, take it as soon as you can. If it is almost time for your next dose, take only that dose. Do not take double or extra doses.  What may interact with this medicine?  -diuretics  -lithium  -NSAIDs, medicines for pain and inflammation, like ibuprofen or naproxen  -over-the-counter herbal supplements like hawthorn  -potassium salts or potassium supplements  -salt substitutes  This list may not describe all possible  interactions. Give your health care provider a list of all the medicines, herbs, non-prescription drugs, or dietary supplements you use. Also tell them if you smoke, drink alcohol, or use illegal drugs. Some items may interact with your medicine.  What should I watch for while using this medicine?  Visit your doctor or health care professional for regular check ups. Check your blood pressure as directed. Ask your doctor what your blood pressure should be, and when you should contact him or her. Call your doctor or health care professional if you notice an irregular or fast heart beat.  Women should inform their doctor if they wish to become pregnant or think they might be pregnant. There is a potential for serious side effects to an unborn child. Talk to your health care professional or pharmacist for more information.  Check with your doctor or health care professional if you get an attack of severe diarrhea, nausea and vomiting, or if you sweat a lot. The loss of too much body fluid can make it dangerous for you to take this medicine.  You may get drowsy or dizzy. Do not drive, use machinery, or do anything that needs mental alertness until you know how this drug affects you. Do not stand or sit up quickly, especially if you are an older patient. This reduces the risk of dizzy or fainting spells. Alcohol can make you more drowsy and dizzy. Avoid alcoholic drinks.  Avoid salt substitutes unless you are told otherwise by your doctor or health care professional.  Do not treat yourself for coughs, colds, or pain while you are taking this medicine without asking your doctor or health care professional for advice. Some ingredients may increase your blood pressure.  What side effects may I notice from receiving this medicine?  Side effects that you should report to your doctor or health care professional as soon as possible:  -abdominal pain with or without nausea or vomiting  -allergic reactions like skin rash or hives,  swelling of the hands, feet, face, lips, throat, or tongue  -dark urine  -difficulty breathing  -dizzy, lightheaded or fainting spell  -fever or sore throat  -irregular heart beat, chest pain  -pain or difficulty passing urine  -redness, blistering, peeling or loosening of the skin, including inside the mouth  -unusually weak  -yellowing of the eyes or skin  Side effects that usually do not require medical attention (report to your doctor or health care professional if they continue or are bothersome):  -change in taste  -cough  -decreased sexual function or desire  -headache  -sun sensitivity  -tiredness  This list may not describe all possible side effects. Call your doctor for medical advice about side effects. You may report side effects to FDA at 8-011-FDA-3668.  Where should I keep my medicine?  Keep out of the reach of children.  Store at room temperature between 15 and 30 degrees C (59 and 86 degrees F). Protect from moisture. Keep container tightly closed. Throw away any unused medicine after the expiration date.  NOTE: This sheet is a summary. It may not cover all possible information. If you have questions about this medicine, talk to your doctor, pharmacist, or health care provider.  © 2014, [employer]. (6/22/2009 5:36:32 PM)    Glyburide tablets  What is this medicine?  GLYBURIDE (GLYE byoor yamilet) helps to treat type 2 diabetes. Treatment is combined with diet and exercise. The medicine helps your body to use insulin better.  This medicine may be used for other purposes; ask your health care provider or pharmacist if you have questions.  COMMON BRAND NAME(S): Diabeta, Glycron, Glynase PresTab, Micronase  What should I tell my health care provider before I take this medicine?  They need to know if you have any of these conditions:  -diabetic ketoacidosis  -glucose-6-phosphate dehydrogenase deficiency  -heart disease  -kidney disease  -liver disease  -severe infection or injury  -thyroid  disease  -an unusual or allergic reaction to glyburide, sulfa drugs, other medicines, foods, dyes, or preservatives  -pregnant or trying to get pregnant  -breast-feeding  How should I use this medicine?  Take this medicine by mouth with a glass of water. Follow the directions on the prescription label. If you take this medicine once a day, take it with breakfast or the first main meal of the day. Take your medicine at the same time each day. Do not take more often than directed.  Talk to your pediatrician regarding the use of this medicine in children. Special care may be needed.  Elderly patients over 65 years old may have a stronger reaction and need a smaller dose.  Overdosage: If you think you have taken too much of this medicine contact a poison control center or emergency room at once.  NOTE: This medicine is only for you. Do not share this medicine with others.  What if I miss a dose?  If you miss a dose, take it as soon as you can. If it is almost time for your next dose, take only that dose. Do not take double or extra doses.  What may interact with this medicine?  -bosentan  -chloramphenicol  -cisapride  -clarithromycin  -medicines for fungal or yeast infections  -metoclopramide  -probenecid  -rifampin  -warfarin  Many medications may cause an increase or decrease in blood sugar, these include:  -alcohol containing beverages  -angiotensin converting enzyme inhibitors like enalapril, captopril, and lisinopril  -aspirin and aspirin-like drugs  -chloramphenicol  -chromium  -female hormones, like estrogens or progestins and birth control pills  -fluoxetine  -heart medicines like disopyramide  -isoniazid  -male hormones or anabolic steroids  -medicines called MAO Inhibitors like Nardil, Parnate, Marplan, Eldepryl  -medicines for allergies, asthma, cold, or cough  -medicines for mental problems  -medicines for weight loss  -niacin  -NSAIDs, medicines for pain and inflammation, like ibuprofen or  naproxen  -pentamidine  -phenytoin  -probenecid  -quinolone antibiotics like ciprofloxacin, levofloxacin, ofloxacin  -some herbal dietary supplements  -steroid medicines like prednisone or cortisone  -thyroid medicine  -water pills or diuretics  This list may not describe all possible interactions. Give your health care provider a list of all the medicines, herbs, non-prescription drugs, or dietary supplements you use. Also tell them if you smoke, drink alcohol, or use illegal drugs. Some items may interact with your medicine.  What should I watch for while using this medicine?  Visit your doctor or health care professional for regular checks on your progress.  A test called the HbA1C (A1C) will be monitored. This is a simple blood test. It measures your blood sugar control over the last 2 to 3 months. You will receive this test every 3 to 6 months.  Learn how to check your blood sugar. Learn the symptoms of low and high blood sugar and how to manage them.  Always carry a quick-source of sugar with you in case you have symptoms of low blood sugar. Examples include hard sugar candy or glucose tablets. Make sure others know that you can choke if you eat or drink when you develop serious symptoms of low blood sugar, such as seizures or unconsciousness. They must get medical help at once.  Tell your doctor or health care professional if you have high blood sugar. You might need to change the dose of your medicine. If you are sick or exercising more than usual, you might need to change the dose of your medicine.  Do not skip meals. Ask your doctor or health care professional if you should avoid alcohol. Many nonprescription cough and cold products contain sugar or alcohol. These can affect blood sugar.  This medicine can make you more sensitive to the sun. Keep out of the sun. If you cannot avoid being in the sun, wear protective clothing and use sunscreen. Do not use sun lamps or tanning beds/booths.  Wear a medical ID  bracelet or chain, and carry a card that describes your disease and details of your medicine and dosage times.  What side effects may I notice from receiving this medicine?  Side effects that you should report to your doctor or health care professional as soon as possible:  -allergic reactions like skin rash, itching or hives, swelling of the face, lips, or tongue  -breathing problems  -dark urine  -fever, chills, sore throat  -signs and symptoms of low blood sugar such as feeling anxious, confusion, dizziness, increased hunger, unusually weak or tired, sweating, shakiness, cold, irritable, headache, blurred vision, fast heartbeat, loss of consciousness  -unusual bleeding or bruising  -yellowing of the eyes or skin  Side effects that usually do not require medical attention (report to your doctor or health care professional if they continue or are bothersome):  -diarrhea  -dizziness  -headache  -heartburn  -nausea  -stomach gas  This list may not describe all possible side effects. Call your doctor for medical advice about side effects. You may report side effects to FDA at 3-342-FDA-5302.  Where should I keep my medicine?  Keep out of the reach of children.  Store at room temperature between 15 and 30 degrees C (59 and 86 degrees F). Throw away any unused medicine after the expiration date.  NOTE: This sheet is a summary. It may not cover all possible information. If you have questions about this medicine, talk to your doctor, pharmacist, or health care provider.  © 2014, [employer]. (4/2/2014 2:44:31 PM)    Metoprolol tablets  What is this medicine?  METOPROLOL (me TOE proe lole) is a beta-blocker. Beta-blockers reduce the workload on the heart and help it to beat more regularly. This medicine is used to treat high blood pressure and to prevent chest pain. It is also used to after a heart attack and to prevent an additional heart attack from occurring.  This medicine may be used for other purposes; ask  your health care provider or pharmacist if you have questions.  COMMON BRAND NAME(S): Lopressor  What should I tell my health care provider before I take this medicine?  They need to know if you have any of these conditions:  -diabetes  -heart or vessel disease like slow heart rate, worsening heart failure, heart block, sick sinus syndrome or Raynaud's disease  -kidney disease  -liver disease  -lung or breathing disease, like asthma or emphysema  -pheochromocytoma  -thyroid disease  -an unusual or allergic reaction to metoprolol, other beta-blockers, medicines, foods, dyes, or preservatives  -pregnant or trying to get pregnant  -breast-feeding  How should I use this medicine?  Take this medicine by mouth with a drink of water. Follow the directions on the prescription label. Take this medicine immediately after meals. Take your doses at regular intervals. Do not take more medicine than directed. Do not stop taking this medicine suddenly. This could lead to serious heart-related effects.  Talk to your pediatrician regarding the use of this medicine in children. Special care may be needed.  Overdosage: If you think you have taken too much of this medicine contact a poison control center or emergency room at once.  NOTE: This medicine is only for you. Do not share this medicine with others.  What if I miss a dose?  If you miss a dose, take it as soon as you can. If it is almost time for your next dose, take only that dose. Do not take double or extra doses.  What may interact with this medicine?  Do not take this medicine with any of the following medications:  -sotalol  This medicine may also interact with the following medications:  -clonidine  -digoxin  -dobutamine  -epinephrine  -isoproterenol  -medicine to control heart rhythm like quinidine, propafenone  -medicine for depression like monoamine oxidase (MAO) inhibitors, fluoxetine, and paroxetine  -medicine for high blood pressure like calcium channel  blockers  -reserpine  This list may not describe all possible interactions. Give your health care provider a list of all the medicines, herbs, non-prescription drugs, or dietary supplements you use. Also tell them if you smoke, drink alcohol, or use illegal drugs. Some items may interact with your medicine.  What should I watch for while using this medicine?  Visit your doctor or health care professional for regular check ups. Contact your doctor right away if your symptoms worsen. Check your blood pressure and pulse rate regularly. Ask your health care professional what your blood pressure and pulse rate should be, and when you should contact them.  You may get drowsy or dizzy. Do not drive, use machinery, or do anything that needs mental alertness until you know how this medicine affects you. Do not sit or stand up quickly, especially if you are an older patient. This reduces the risk of dizzy or fainting spells. Contact your doctor if these symptoms continue. Alcohol may interfere with the effect of this medicine. Avoid alcoholic drinks.  What side effects may I notice from receiving this medicine?  Side effects that you should report to your doctor or health care professional as soon as possible:  -allergic reactions like skin rash, itching or hives  -cold or numb hands or feet  -depression  -difficulty breathing  -faint  -fever with sore throat  -irregular heartbeat, chest pain  -rapid weight gain  -swollen legs or ankles  Side effects that usually do not require medical attention (report to your doctor or health care professional if they continue or are bothersome):  -anxiety or nervousness  -change in sex drive or performance  -dry skin  -headache  -nightmares or trouble sleeping  -short term memory loss  -stomach upset or diarrhea  -unusually tired  This list may not describe all possible side effects. Call your doctor for medical advice about side effects. You may report side effects to FDA at  1-800-FDA-1088.  Where should I keep my medicine?  Keep out of the reach of children.  Store at room temperature between 15 and 30 degrees C (59 and 86 degrees F). Throw away any unused medicine after the expiration date.  NOTE: This sheet is a summary. It may not cover all possible information. If you have questions about this medicine, talk to your doctor, pharmacist, or health care provider.  © 2014, [employer]vier/Gold Standard. (2/27/2009 4:11:19 PM)

## 2018-02-17 NOTE — CARE PLAN
Problem: Communication  Goal: The ability to communicate needs accurately and effectively will improve  Outcome: PROGRESSING AS EXPECTED  Pt. Is able to express comfort needs effectively. Pt. Denies pain or discomfort at this time and is able to vocalize wants and needs.     Problem: Safety  Goal: Will remain free from falls  Outcome: PROGRESSING AS EXPECTED  Pt. Is ambulatory and steady. No assistance required. Pt. Remains free from falls at this time.

## 2018-02-17 NOTE — PROGRESS NOTES
Assumed care at 0700, bedside report received from night shift RN. Patient is AOx4 with no signs of distress. Pt. Is SR 62 on the monitor. Initial assessment completed, orders reviewed, call light within reach, and hourly rounding in place. POC addressed with patient, no additional questions at this time.

## 2018-02-17 NOTE — DISCHARGE PLANNING
Medical Social Worker    Pt reports he will pay the $468 for Elliquis.  SW provided Goodrx which will be $441.98 to pt.  Pt reviewed and initialed IMM.    Plan:  SW will assist with d/c needs.

## 2018-02-17 NOTE — CARE PLAN
Problem: Safety  Goal: Will remain free from injury  Outcome: PROGRESSING AS EXPECTED  Pt educated regarding fall risk and safety, pt verbalizes understanding and calls appropriately for assistance. Bed is locked and in lowest position, safety maintained throughout shift.     Problem: Knowledge Deficit  Goal: Knowledge of the prescribed therapeutic regimen will improve  Outcome: PROGRESSING AS EXPECTED  Pt and wife at bedside educated regarding plan of care, both are knowledgeable and involved in plan, verbalize understanding.

## 2018-02-17 NOTE — PROGRESS NOTES
Pt. Was discharged home via car with relative. Discharge paperwork was discussed with the patient. All personal belongs were accounted for and taken with the patient. LDA's were removed and the tele monitor was disconnected. Pt. prescriptions sent to pharmacy. Pt walked self out with wife.

## 2018-02-17 NOTE — DISCHARGE PLANNING
Medical Social Worker    GIANCARLO contacted [facility] and reports pt has no prescription coverage and Elliquis is $468.  GIANCARLO will communicate with Hospitalist and Hospitalist RN on above.  GIANCARLO communicated with bedside nurse on above.     Plan:  GIANCARLO will continue to assist with pt's d/c needs and will

## 2018-02-17 NOTE — PROGRESS NOTES
Cardiology Progress Note               Author: Allison Alfaro Date & Time created: 2018  12:10 PM           Consultation for new cardiomyopathy, EF 20%      Admitted with supple massive bilateral pulmonary emboli s/p thrombolytics      History of DVT in , CVA in  (embolic in etiology, type 2 diabetes, cardiomyopathy ( recovered on meds )  previous MPI, no hx of coronary angiography      Labs reviewed  Sodium, potassium, creatinine stable        BP = 125/71  HR = 66 SR    Review of Systems   Respiratory: Negative for cough and shortness of breath.    Cardiovascular: Negative for chest pain, palpitations, orthopnea, claudication, leg swelling and PND.       Physical Exam   Constitutional: He is oriented to person, place, and time. He appears well-developed.   HENT:   Head: Normocephalic.   Eyes: Conjunctivae are normal.   Neck: No JVD present. No thyromegaly present.   Cardiovascular: Normal rate and regular rhythm.    Pulses:       Carotid pulses are 2+ on the right side, and 2+ on the left side.       Radial pulses are 2+ on the right side, and 2+ on the left side.        Posterior tibial pulses are 2+ on the right side, and 2+ on the left side.   Pulmonary/Chest: He has no wheezes.   Abdominal: Soft.   Musculoskeletal: He exhibits no edema.   Neurological: He is alert and oriented to person, place, and time.   Skin: Skin is warm and dry.       Hemodynamics:  Temp (24hrs), Av.3 °C (97.4 °F), Min:36.2 °C (97.1 °F), Max:36.6 °C (97.8 °F)  Temperature: 36.2 °C (97.1 °F)  Pulse  Av.6  Min: 64  Max: 109   Blood Pressure: 125/71, NIBP: 118/73     Respiratory:    Respiration: 16, Pulse Oximetry: 100 %     Work Of Breathing / Effort: Mild  RUL Breath Sounds: Diminished, RML Breath Sounds: Diminished, RLL Breath Sounds: Diminished, LATASHA Breath Sounds: Clear, LLL Breath Sounds: Diminished  Fluids:     Weight: 99.8 kg (220 lb 0.3 oz)  GI/Nutrition:  Orders Placed This Encounter   Procedures   • DIET ORDER      Standing Status:   Standing     Number of Occurrences:   1     Order Specific Question:   Diet:     Answer:   Diabetic [3]     Order Specific Question:   Diet:     Answer:   Cardiac [6]     Lab Results:  Recent Labs      02/14/18   1245  02/15/18   0300  02/16/18   0600   WBC  10.1  9.5  10.1   RBC  4.69*  4.02*  4.32*   HEMOGLOBIN  14.2  12.2*  13.1*   HEMATOCRIT  42.6  36.2*  39.3*   MCV  90.8  90.0  91.0   MCH  30.3  30.3  30.3   MCHC  33.3*  33.7  33.3*   RDW  43.5  43.1  42.1   PLATELETCT  142*  140*  189   MPV  9.9  10.6  9.8     Recent Labs      02/15/18   0804  02/16/18   0600  02/17/18   0458   SODIUM  134*  133*  136   POTASSIUM  4.3  4.0  3.8   CHLORIDE  105  105  106   CO2  22  19*  21   GLUCOSE  185*  125*  106*   BUN  16  14  15   CREATININE  0.70  0.55  0.72   CALCIUM  8.4*  8.3*  7.9*     Recent Labs      02/14/18   1245   02/16/18   2209  02/17/18   0458  02/17/18   1043   APTT  53.9*   < >  46.8*  77.3*  54.3*   INR  1.22*   --    --    --    --     < > = values in this interval not displayed.     Recent Labs      02/14/18   1245   BNPBTYPENAT  327*     Recent Labs      02/14/18   1245  02/15/18   0300   TROPONINI  0.41*  0.24*   BNPBTYPENAT  327*   --              Medical Decision Making, by Problem:  Active Hospital Problems    Diagnosis   • *Bilateral pulmonary embolism (CMS-HCC) [I26.99]   • DVT, lower extremity, proximal, acute, left (CMS-HCC) [I82.4Y2]   • Acute respiratory failure with hypoxia (CMS-HCC) [J96.01]   • Type 2 diabetes mellitus (CMS-HCC) [E11.9]   • Cardiomyopathy (CMS-[facility]) [I42.9]       Assessment/Plan:    Recurrent DVT    Submassive bilateral pulmonary emboli on presentation    Severely reduced LV systolic function by recent echo, EF 20% ( on lisinopril 5 mg, Toprol- mg, will increase spironolactone to 25 mg ) , uptitrate lisinopril as blood pressure permits    No evidence of decompensated heart failure    No rhythm issues overnight    MPI on 2/17/18 revealed no  reversible defect that would indicate ischemia    Currently on IV heparin, plan is to switch to oral anticoagulation    Will schedule follow-up appointment with our cardiology office in one week    Cardiology will sign off, please call with any questions              Quality-Core Measures   Reviewed items::  Medications reviewed and Labs reviewed

## 2018-02-17 NOTE — PROGRESS NOTES
Assumed care of patient at 19:15, bedside report received. No Heparin bags available in [facility] on floor, spoke with Pharmacy Tech who states they will tube up.

## 2018-02-17 NOTE — DISCHARGE PLANNING
Medical Social Worker    GIANCARLO faxed over prescription, Eliquis to pt's preferred pharmacy, [facility] in [place].     Plan:  SW will find out co-pay and assist with pt's d/c needs.

## 2018-02-18 NOTE — DISCHARGE SUMMARY
CHIEF COMPLAINT ON ADMISSION  Chief Complaint   Patient presents with   • Pulmonary Embolism   • Syncope       CODE STATUS  Full Code    HPI & HOSPITAL COURSE  This is a 57 y.o. Male With a history of here type II diabetes, history of CVA with left-sided weakness, remote history of DVT of left lower extremity, chronic back pain due to remote injury admitted with worsening swelling and collapse. He initially presented to [facility] where CTs scan was obtained and revealed bilateral submassive PE with right heart strain. He was transferred to [facility] and upon his arrival he received TPA.  This was well-tolerated and he had no evidence of bleeding.    Echocardiogram was performed to reevaluate for any rash strain and revealed an ejection fraction of 20%. This is a new finding. Because of this cardiology was consulted. The patient was started on heparin infusion for the PE finding, this was continued. The patient was also started on medication management for systolic heart failure including spinal lateral, lisinopril, metoprolol. The decision was to take the patient for a stress test and if this was abnormal to proceed with cardiac catheterization. If the stress test was normal he will be discharged on medical therapy.    His A1c was found to be elevated therefore he was recommended insulin therapy over the patient adamantly refused. He did except glyburide therapy which was initiated.  Metformin was not started due to possibility of getting a cardiac cath, with plan to initiate this medication following cath if needed.    History of systems was performed on 2/17/2018 and revealed a reduced yet however showed no reversible deficits. His every drip was discontinued and he was discharged on oral anticoagulation therapy with Eliquis.  Metformin was initiated upon discharge as he did not require cardiac cath.    He will follow-up with cardiology within 1 week as well as with primary  care within 1 week.  The patient met 2-midnight criteria for an inpatient stay at the time of discharge.    Therefore, he is discharged in fair and stable condition with close outpatient follow-up.    SPECIFIC OUTPATIENT FOLLOW-UP  Cardiology as scheduled within 1 week  Follow-up with PCP    DISCHARGE PROBLEM LIST  Principal Problem:    Bilateral pulmonary embolism (CMS-HCC) POA: Yes  Active Problems:    Acute respiratory failure with hypoxia (CMS-HCC) POA: Yes    DVT, lower extremity, proximal, acute, left (CMS-HCC) POA: Yes    Type 2 diabetes mellitus (CMS-HCC) POA: Yes    Cardiomyopathy (CMS-HCC) POA: Yes  Resolved Problems:    * No resolved hospital problems. *      FOLLOW UP  Future Appointments  Date Time Provider Department Center   2/22/2018 11:15 AM Michel Gonzalez M.D. [facility] None   3/21/2018 1:40 PM IVELISSE Anderson     No follow-up provider specified.    MEDICATIONS ON DISCHARGE   Ryan Small   Home Medication Instructions CHAI:[id number]    Printed on:02/17/18 9125   Medication Information                      apixaban (ELIQUIS) 5mg Tab  Take 1 Tab by mouth 2 Times a Day.             atorvastatin (LIPITOR) 40 MG Tab  Take 1 Tab by mouth every bedtime.             furosemide (LASIX) 20 MG Tab  Take 1 Tab by mouth 2 Times a Day.             glyBURIDE (DIABETA) 5 MG Tab  Take 1 Tab by mouth 2 times a day, with meals.             lisinopril (PRINIVIL) 5 MG Tab  Take 1 Tab by mouth every day.             metformin (GLUCOPHAGE) 1000 MG tablet  Take 1 Tab by mouth 2 times a day, with meals.             metoprolol SR (TOPROL XL) 100 MG TABLET SR 24 HR  Take 1 Tab by mouth every evening.             spironolactone (ALDACTONE) 25 MG Tab  Take 1 Tab by mouth every day.                 DIET  Orders Placed This Encounter   Procedures   • DIET ORDER     Standing Status:   Standing     Number of Occurrences:   1     Order Specific Question:   Diet:     Answer:   Diabetic [3]     Order Specific  Question:   Diet:     Answer:   Cardiac [6]   • DISCONTINUE DIET TRAY     Standing Status:   Standing     Number of Occurrences:   1       ACTIVITY  As tolerated.  Weight bearing as tolerated      CONSULTATIONS  Dr. Cuadra, cardiology  Dr. Barth, pulmonology    PROCEDURES  None    LABORATORY  Lab Results   Component Value Date/Time    SODIUM 136 02/17/2018 04:58 AM    POTASSIUM 3.8 02/17/2018 04:58 AM    CHLORIDE 106 02/17/2018 04:58 AM    CO2 21 02/17/2018 04:58 AM    GLUCOSE 106 (H) 02/17/2018 04:58 AM    BUN 15 02/17/2018 04:58 AM    CREATININE 0.72 02/17/2018 04:58 AM        Lab Results   Component Value Date/Time    WBC 10.1 02/16/2018 06:00 AM    HEMOGLOBIN 13.1 (L) 02/16/2018 06:00 AM    HEMATOCRIT 39.3 (L) 02/16/2018 06:00 AM    PLATELETCT 189 02/16/2018 06:00 AM        Total time of the discharge process exceeds 40 minutes

## 2018-02-22 ENCOUNTER — OFFICE VISIT (OUTPATIENT)
Dept: CARDIOLOGY | Facility: MEDICAL CENTER | Age: 58
End: 2018-02-22
Payer: MEDICARE

## 2018-02-22 VITALS
DIASTOLIC BLOOD PRESSURE: 80 MMHG | HEART RATE: 64 BPM | SYSTOLIC BLOOD PRESSURE: 114 MMHG | BODY MASS INDEX: 30.49 KG/M2 | OXYGEN SATURATION: 100 % | WEIGHT: 213 LBS | HEIGHT: 70 IN

## 2018-02-22 DIAGNOSIS — I42.9 CARDIOMYOPATHY, UNSPECIFIED TYPE (HCC): ICD-10-CM

## 2018-02-22 DIAGNOSIS — I26.99 BILATERAL PULMONARY EMBOLISM (HCC): ICD-10-CM

## 2018-02-22 PROCEDURE — 99214 OFFICE O/P EST MOD 30 MIN: CPT | Performed by: INTERNAL MEDICINE

## 2018-02-22 RX ORDER — LISINOPRIL 10 MG/1
10 TABLET ORAL DAILY
Qty: 30 TAB | Refills: 11 | Status: SHIPPED | OUTPATIENT
Start: 2018-02-22 | End: 2019-03-07 | Stop reason: SDUPTHER

## 2018-02-22 NOTE — PROGRESS NOTES
Subjective:   Ryan Small is a 57 y.o. male who presents today for follow-up post a recent admission for massive bilateral pulmonary emboli and recurrent cardiomyopathy with an ejection fraction of approximately 20%.   He has a history of cardiomyopathy in the past which resolved on medical therapy. On admission he was taking no medications for his cardiomyopathy. He is now back on medical therapy.    He has developed a rash on his lower extremities since discharge. He continues have some left lower extremity edema but it improved significantly. He denies any chest discomfort, dyspnea, palpitations or lightheadedness.    Of note, on admission he did have evidence of significant right-sided strain. He did receive thrombolytic therapy and his right ventricle is at time of echocardiography was normal.    Past Medical History:   Diagnosis Date   • CVA (cerebral vascular accident) (CMS-[facility])     left sided weakness   • DM II (diabetes mellitus, type II), controlled (CMS-[facility])    • TIA (transient ischemic attack)      Past Surgical History:   Procedure Laterality Date   • TONSILLECTOMY       History reviewed. No pertinent family history.  History   Smoking Status   • Never Smoker   Smokeless Tobacco   • Never Used     No Known Allergies  Outpatient Encounter Prescriptions as of 2/22/2018   Medication Sig Dispense Refill   • glyBURIDE (DIABETA) 5 MG Tab Take 1 Tab by mouth 2 times a day, with meals. 30 Tab 2   • atorvastatin (LIPITOR) 40 MG Tab Take 1 Tab by mouth every bedtime. 30 Tab 2   • lisinopril (PRINIVIL) 5 MG Tab Take 1 Tab by mouth every day. 30 Tab 2   • metoprolol SR (TOPROL XL) 100 MG TABLET SR 24 HR Take 1 Tab by mouth every evening. 30 Tab 2   • furosemide (LASIX) 20 MG Tab Take 1 Tab by mouth 2 Times a Day. 60 Tab 2   • spironolactone (ALDACTONE) 25 MG Tab Take 1 Tab by mouth every day. 30 Tab 3   • metformin (GLUCOPHAGE) 1000 MG tablet Take 1 Tab by mouth 2 times a day, with meals. 60 Tab 2   • apixaban  "(ELIQUIS) 5mg Tab Take 1 Tab by mouth 2 Times a Day. 60 Tab 0     No facility-administered encounter medications on file as of 2/22/2018.      ROS     Objective:   /80   Pulse 64   Ht 1.778 m (5' 10\")   Wt 96.6 kg (213 lb)   SpO2 100%   BMI 30.56 kg/m²     Physical Exam   Neck: No JVD present.   Cardiovascular: Normal rate and regular rhythm.  Exam reveals no gallop.    No murmur heard.  Pulmonary/Chest: Effort normal. He has no rales.   Abdominal: Soft. There is no tenderness.   Musculoskeletal: He exhibits edema (2+ left pretibial).     No results found for: CHOLSTRLTOT, LDL, HDL, TRIGLYCERIDE    Lab Results   Component Value Date/Time    SODIUM 136 02/17/2018 04:58 AM    POTASSIUM 3.8 02/17/2018 04:58 AM    CHLORIDE 106 02/17/2018 04:58 AM    CO2 21 02/17/2018 04:58 AM    GLUCOSE 106 (H) 02/17/2018 04:58 AM    BUN 15 02/17/2018 04:58 AM    CREATININE 0.72 02/17/2018 04:58 AM     Lab Results   Component Value Date/Time    ALKPHOSPHAT 64 02/15/2018 08:04 AM    ASTSGOT 20 02/15/2018 08:04 AM    ALTSGPT 44 02/15/2018 08:04 AM    TBILIRUBIN 0.6 02/15/2018 08:04 AM      Lab Results   Component Value Date/Time    BNPBTYPENAT 327 (H) 02/14/2018 12:45 PM            Echocardiogram:  CONCLUSIONS  No prior study is available for comparison.   Severely reduced left ventricular systolic function.  Left ventricular ejection fraction is visually estimated to be 20%.  Diastolic function is difficult to asses.   Mild mitral regurgitation.  Unable to estimate pulmonary artery pressure due to an inadequate   tricuspid regurgitant jet.    JUNAID NAVARRETE  Exam Date:         02/15/2018        Assessment:     1. Bilateral pulmonary embolism (CMS-HCC)     2. Cardiomyopathy, unspecified type (CMS-HCC)         Medical Decision Making:  Today's Assessment / Status / Plan:     Mr. Navarrete is clinically stable. He had an excellent response to thrombolysis for his submassive pulmonary emboli. He continues to have some left lower " extremity edema. He has a rash localized to the left lower extremity and it is probably secondary to the prior severe edema which is improving. I doubt that it is a medication reaction. He will try over-the-counter steroid cream to see if this improves his rash. At this time, we will increase lisinopril to 10 mg daily. He will follow-up in a week with a BMP. Once his optimize his medical therapy, we will repeat his echocardiogram to decide whether or not he might need an AICD.     Mr. Small is clinically stable. He will need lifelong anticoagulant therapy.

## 2018-02-22 NOTE — LETTER
[facility]   [address]  [place] [address]  Phone: [phone]  Fax: [phone]              Ryan Small  1960    Encounter Date: 2/22/2018    Michel Gonzalez M.D.          PROGRESS NOTE:  Subjective:   Ryan Small is a 57 y.o. male who presents today for follow-up post a recent admission for massive bilateral pulmonary emboli and recurrent cardiomyopathy with an ejection fraction of approximately 20%.   He has a history of cardiomyopathy in the past which resolved on medical therapy. On admission he was taking no medications for his cardiomyopathy. He is now back on medical therapy.    He has developed a rash on his lower extremities since discharge. He continues have some left lower extremity edema but it improved significantly. He denies any chest discomfort, dyspnea, palpitations or lightheadedness.    Of note, on admission he did have evidence of significant right-sided strain. He did receive thrombolytic therapy and his right ventricle is at time of echocardiography was normal.    Past Medical History:   Diagnosis Date   • CVA (cerebral vascular accident) (CMS-HCC)     left sided weakness   • DM II (diabetes mellitus, type II), controlled (CMS-[facility])    • TIA (transient ischemic attack)      Past Surgical History:   Procedure Laterality Date   • TONSILLECTOMY       History reviewed. No pertinent family history.  History   Smoking Status   • Never Smoker   Smokeless Tobacco   • Never Used     No Known Allergies  Outpatient Encounter Prescriptions as of 2/22/2018   Medication Sig Dispense Refill   • glyBURIDE (DIABETA) 5 MG Tab Take 1 Tab by mouth 2 times a day, with meals. 30 Tab 2   • atorvastatin (LIPITOR) 40 MG Tab Take 1 Tab by mouth every bedtime. 30 Tab 2   • lisinopril (PRINIVIL) 5 MG Tab Take 1 Tab by mouth every day. 30 Tab 2   • metoprolol SR (TOPROL XL) 100 MG TABLET SR 24 HR Take 1 Tab by mouth every evening. 30 Tab 2   •  "furosemide (LASIX) 20 MG Tab Take 1 Tab by mouth 2 Times a Day. 60 Tab 2   • spironolactone (ALDACTONE) 25 MG Tab Take 1 Tab by mouth every day. 30 Tab 3   • metformin (GLUCOPHAGE) 1000 MG tablet Take 1 Tab by mouth 2 times a day, with meals. 60 Tab 2   • apixaban (ELIQUIS) 5mg Tab Take 1 Tab by mouth 2 Times a Day. 60 Tab 0     No facility-administered encounter medications on file as of 2/22/2018.      ROS     Objective:   /80   Pulse 64   Ht 1.778 m (5' 10\")   Wt 96.6 kg (213 lb)   SpO2 100%   BMI 30.56 kg/m²      Physical Exam   Neck: No JVD present.   Cardiovascular: Normal rate and regular rhythm.  Exam reveals no gallop.    No murmur heard.  Pulmonary/Chest: Effort normal. He has no rales.   Abdominal: Soft. There is no tenderness.   Musculoskeletal: He exhibits edema (2+ left pretibial).     No results found for: CHOLSTRLTOT, LDL, HDL, TRIGLYCERIDE    Lab Results   Component Value Date/Time    SODIUM 136 02/17/2018 04:58 AM    POTASSIUM 3.8 02/17/2018 04:58 AM    CHLORIDE 106 02/17/2018 04:58 AM    CO2 21 02/17/2018 04:58 AM    GLUCOSE 106 (H) 02/17/2018 04:58 AM    BUN 15 02/17/2018 04:58 AM    CREATININE 0.72 02/17/2018 04:58 AM     Lab Results   Component Value Date/Time    ALKPHOSPHAT 64 02/15/2018 08:04 AM    ASTSGOT 20 02/15/2018 08:04 AM    ALTSGPT 44 02/15/2018 08:04 AM    TBILIRUBIN 0.6 02/15/2018 08:04 AM      Lab Results   Component Value Date/Time    BNPBTYPENAT 327 (H) 02/14/2018 12:45 PM            Echocardiogram:  CONCLUSIONS  No prior study is available for comparison.   Severely reduced left ventricular systolic function.  Left ventricular ejection fraction is visually estimated to be 20%.  Diastolic function is difficult to asses.   Mild mitral regurgitation.  Unable to estimate pulmonary artery pressure due to an inadequate   tricuspid regurgitant jet.    JUNAID NAVARRETE  Exam Date:         02/15/2018        Assessment:     1. Bilateral pulmonary embolism (CMS-HCC)     2. " Cardiomyopathy, unspecified type (CMS-HCC)         Medical Decision Making:  Today's Assessment / Status / Plan:     Mr. Small is clinically stable. He had an excellent response to thrombolysis for his submassive pulmonary emboli. He continues to have some left lower extremity edema. He has a rash localized to the left lower extremity and it is probably secondary to the prior severe anemia which is improving. I doubt that it is a medication reaction. He will try over-the-counter steroid cream to see if this improves his rash. At this time, we will increase lisinopril to 10 mg daily. He will follow-up in a week with a BMP. Once his optimize his medical therapy, we will repeat his echocardiogram to decide whether or not he might need an AICD. Try to get your medications are about a month repeat her echo and see if this improved. Ejection fraction about 2 or 3 months out from side still low then we'll have to see electrophysiologist needed      Mr. Small is clinically stable. He will need lifelong anticoagulant therapy.      Andre Falk M.D.  [address]  [address]  [place] [address]  VIA In Basket

## 2018-02-28 ENCOUNTER — TELEPHONE (OUTPATIENT)
Dept: CARDIOLOGY | Facility: MEDICAL CENTER | Age: 58
End: 2018-02-28

## 2018-02-28 NOTE — TELEPHONE ENCOUNTER
Spoke to wife and discussed side effects of atorvastatin and grapefruit. Pt will try cutting back on grapefruit consumption to see if leg cramps subside. Declines further questions at this time.

## 2018-02-28 NOTE — TELEPHONE ENCOUNTER
----- Message from Sandra Benavides sent at 2/27/2018  3:30 PM PST -----  Regarding: patient having leg cramps during the night  MICAH/[place]      Patient is having bad leg cramps during the night. His wife Kimberly says he does eat grapefruit every morning. He takes Atorvastatin before bed so she wants to know if this combination is causing the cramping. She can be reached at [phone]

## 2018-03-12 ENCOUNTER — TELEPHONE (OUTPATIENT)
Dept: CARDIOLOGY | Facility: MEDICAL CENTER | Age: 58
End: 2018-03-12

## 2018-03-13 ENCOUNTER — HOSPITAL ENCOUNTER (OUTPATIENT)
Dept: LAB | Facility: MEDICAL CENTER | Age: 58
End: 2018-03-13
Attending: INTERNAL MEDICINE
Payer: MEDICARE

## 2018-03-13 DIAGNOSIS — I26.99 BILATERAL PULMONARY EMBOLISM (HCC): ICD-10-CM

## 2018-03-13 DIAGNOSIS — I42.9 CARDIOMYOPATHY, UNSPECIFIED TYPE (HCC): ICD-10-CM

## 2018-03-13 LAB
ANION GAP SERPL CALC-SCNC: 8 MMOL/L (ref 0–11.9)
BUN SERPL-MCNC: 21 MG/DL (ref 8–22)
CALCIUM SERPL-MCNC: 9 MG/DL (ref 8.5–10.5)
CHLORIDE SERPL-SCNC: 102 MMOL/L (ref 96–112)
CO2 SERPL-SCNC: 25 MMOL/L (ref 20–33)
CREAT SERPL-MCNC: 0.75 MG/DL (ref 0.5–1.4)
GLUCOSE SERPL-MCNC: 173 MG/DL (ref 65–99)
POTASSIUM SERPL-SCNC: 4.4 MMOL/L (ref 3.6–5.5)
SODIUM SERPL-SCNC: 135 MMOL/L (ref 135–145)

## 2018-03-13 PROCEDURE — 36415 COLL VENOUS BLD VENIPUNCTURE: CPT

## 2018-03-13 PROCEDURE — 80048 BASIC METABOLIC PNL TOTAL CA: CPT

## 2018-03-21 ENCOUNTER — OFFICE VISIT (OUTPATIENT)
Dept: CARDIOLOGY | Facility: MEDICAL CENTER | Age: 58
End: 2018-03-21
Payer: MEDICARE

## 2018-03-21 ENCOUNTER — OFFICE VISIT (OUTPATIENT)
Dept: MEDICAL GROUP | Facility: MEDICAL CENTER | Age: 58
End: 2018-03-21
Payer: MEDICARE

## 2018-03-21 VITALS
HEART RATE: 80 BPM | SYSTOLIC BLOOD PRESSURE: 114 MMHG | BODY MASS INDEX: 28.98 KG/M2 | WEIGHT: 207 LBS | HEIGHT: 71 IN | OXYGEN SATURATION: 96 % | DIASTOLIC BLOOD PRESSURE: 64 MMHG | TEMPERATURE: 97.4 F

## 2018-03-21 VITALS
HEIGHT: 71 IN | HEART RATE: 94 BPM | OXYGEN SATURATION: 100 % | DIASTOLIC BLOOD PRESSURE: 70 MMHG | WEIGHT: 205 LBS | SYSTOLIC BLOOD PRESSURE: 128 MMHG | BODY MASS INDEX: 28.7 KG/M2

## 2018-03-21 DIAGNOSIS — Z00.00 ROUTINE GENERAL MEDICAL EXAMINATION AT A HEALTH CARE FACILITY: ICD-10-CM

## 2018-03-21 DIAGNOSIS — I50.20 ACC/AHA STAGE C SYSTOLIC HEART FAILURE (HCC): ICD-10-CM

## 2018-03-21 DIAGNOSIS — I26.99 BILATERAL PULMONARY EMBOLISM (HCC): ICD-10-CM

## 2018-03-21 DIAGNOSIS — E13.3299: ICD-10-CM

## 2018-03-21 DIAGNOSIS — I50.9 HEART FAILURE, NYHA CLASS 2 (HCC): ICD-10-CM

## 2018-03-21 DIAGNOSIS — Z76.89 ENCOUNTER TO ESTABLISH CARE WITH NEW DOCTOR: ICD-10-CM

## 2018-03-21 DIAGNOSIS — E11.3311: ICD-10-CM

## 2018-03-21 DIAGNOSIS — Z79.01 CHRONIC ANTICOAGULATION: ICD-10-CM

## 2018-03-21 DIAGNOSIS — Z13.6 ENCOUNTER FOR LIPID SCREENING FOR CARDIOVASCULAR DISEASE: ICD-10-CM

## 2018-03-21 DIAGNOSIS — I44.0 FIRST DEGREE AV BLOCK: ICD-10-CM

## 2018-03-21 DIAGNOSIS — Z86.73 H/O: CVA (CEREBROVASCULAR ACCIDENT): ICD-10-CM

## 2018-03-21 DIAGNOSIS — E11.8 TYPE 2 DIABETES MELLITUS WITH COMPLICATION, WITHOUT LONG-TERM CURRENT USE OF INSULIN (HCC): ICD-10-CM

## 2018-03-21 DIAGNOSIS — I82.4Y2 DVT, LOWER EXTREMITY, PROXIMAL, ACUTE, LEFT (HCC): ICD-10-CM

## 2018-03-21 DIAGNOSIS — I50.20 SYSTOLIC CHF WITH REDUCED LEFT VENTRICULAR FUNCTION, NYHA CLASS 2 (HCC): ICD-10-CM

## 2018-03-21 DIAGNOSIS — Z86.711 HISTORY OF PULMONARY EMBOLISM: ICD-10-CM

## 2018-03-21 DIAGNOSIS — Z13.220 ENCOUNTER FOR LIPID SCREENING FOR CARDIOVASCULAR DISEASE: ICD-10-CM

## 2018-03-21 PROCEDURE — 99214 OFFICE O/P EST MOD 30 MIN: CPT | Performed by: NURSE PRACTITIONER

## 2018-03-21 PROCEDURE — 92250 FUNDUS PHOTOGRAPHY W/I&R: CPT | Mod: TC | Performed by: FAMILY MEDICINE

## 2018-03-21 PROCEDURE — 99204 OFFICE O/P NEW MOD 45 MIN: CPT | Performed by: FAMILY MEDICINE

## 2018-03-21 RX ORDER — METOPROLOL SUCCINATE 100 MG/1
50 TABLET, EXTENDED RELEASE ORAL EVERY EVENING
Qty: 45 TAB | Refills: 3 | Status: SHIPPED | OUTPATIENT
Start: 2018-03-21 | End: 2018-05-02 | Stop reason: SDUPTHER

## 2018-03-21 RX ORDER — ROSUVASTATIN CALCIUM 20 MG/1
20 TABLET, COATED ORAL EVERY EVENING
Qty: 30 TAB | Refills: 11 | Status: SHIPPED | OUTPATIENT
Start: 2018-03-21 | End: 2024-01-25

## 2018-03-21 RX ORDER — FUROSEMIDE 20 MG/1
20 TABLET ORAL
Qty: 30 TAB | Refills: 11 | Status: SHIPPED | OUTPATIENT
Start: 2018-03-21 | End: 2019-04-01 | Stop reason: SDUPTHER

## 2018-03-21 ASSESSMENT — ENCOUNTER SYMPTOMS
SHORTNESS OF BREATH: 0
FEVER: 0
COUGH: 0
PND: 0
MYALGIAS: 0
PALPITATIONS: 0
ABDOMINAL PAIN: 0
ORTHOPNEA: 0
CLAUDICATION: 0
DIZZINESS: 0

## 2018-03-21 NOTE — PATIENT INSTRUCTIONS
Restart Torpol XL (Metorprolol Succinate) at 50 mg daily at night  Decrease Furosemide to 20 mg daily as needed.   Stop Atorvastatin  Start Rosuvastatin 20 mg daily

## 2018-03-21 NOTE — PATIENT INSTRUCTIONS
"Dr. Falk's tips for \"Lifestyle Medicine:\"     Check out the talk/documentary on \"How not to die\" by Dr. Danish Gordon (on his website nutritionfacts.org, he also authored a book with this title).       1) Make SMART lifestyle changes: Specific, Measurable, Attainable, Relevant, Time-sensitive.  The lifestyle changes that you need to make are with regards to: nutrition, cardiovascular exercise, sleep, stress management.  Make these changes every 2 weeks, revisiting the previous goals and perhaps revising them and/or setting new ones.       2) Nutrition: Make as many changes as you can to increase the amount of whole-foods (not Whole Foods, necessarily!  ;-)), plant-based diet as possible:   A) Books: Eat to Live (Dr. Franc Reno), The Spectrum (Dr. Sandeep Davila), The Starch Solution (Dr. German Knight)      B) Documentaries (can usually be found on [employer]): [place].  Fat, Sick, and Nearly Dead.  Fed Up.           3) Cardiovascular Exercise: The center for disease control recommends a minimum of 150 minutes per week of moderate intensity cardiovascular exercise for weight maintenance and cardiovascular health.  Set this as your initial goal, with at least 30 minutes per session. Types of exercise can include 30 minutes of elliptical, 30 minutes of decently fast jog, 30 minutes of swimming, 30 minutes of heavy gardening (lifting big bags of fertilizer, digging deep holes/ditches).  He can cut down the minute requirements to half, by doing higher intensity sports such as a game of tennis, or soccer.  He notes the library and check out with they have for home exercise programs, as well.       4) Sleep:    A) Goal: Obtain a minimum of 7-8hours of continuous, uninterrupted, restful sleep per night.    B) Tips for Sleep Hygiene:    I) Go to bed and wake up at consistent times whether work/school day or not.     II) Keep room dark, quiet, and comfortable.  Increase exposure to sunlight during awake times and " avoid bright lights (especially anything with a backlight) at least the last 1-2hours before going to sleep.     III) Don't nap.     IV) Avoid stimulant or caffeine use more than 4 hours after wake time.        5) Stress Management: You cannot change the stresses of life dizziness necessarily, but you can change how he responds of them. One good way to manage stress is to write things down in order to help you process how to approach things in general or specifically. Another good way is to talk it out with someone you trusts, specifically your significant other or good friend. A definite great way to deal with stress is to have cardiovascular exercise!

## 2018-03-21 NOTE — PROGRESS NOTES
Chief Complaint   Patient presents with   • Congestive Heart Failure     HF est.       Subjective:   Ryan Small is a 57 y.o. male who presents today for follow up on his Heart Failure with his wife.    Patient was initially seen in the [facility] Failure [facility] on 2/22/18. During that visit, Lisinopril was increased to 10 mg daily. Patient reports no problems with the dose increase. Upon reviewing patient's medical record, patient states he did not  or start Toprol- mg from his discharge. Pt reports the pharmacy did not give them the Rx.     For his symptoms, patient reports mild fatigue, but denies chest pain, palpitations, orthopnea, PND, dizziness/lightheadedness, shortness of breath at rest and with ADLs. Patient reports he may get winded with exertion. He reports he hasn't done much exerting over the past few weeks. Patient also reports he feels his lower extremity edema has resolved.    Patient also reporting he's been having arm crams more in his right and his left since starting his atorvastatin. Patient is wondering if he can try a different medication.    Patient reports his home weights have been decreasing his now weighing 196 pounds.    Additonally, patient has the following medical problems:    -History of cardiomyopathy, previously followed by [facility] in [facility]    -Uncontrolled diabetes, being followed by PCP Dr. Falk. Patient taking metformin and glyburide. Patient refused insulin in the hospital    -Patient had a recent hospitalization from 2/14-17/2018 for submassive PE where he received TPA and had right heart strain. Echo showed EF 20%     -History of a CVA with left-sided weakness    -Chronic back pain    -Hx DVT of LLE    Past Medical History:   Diagnosis Date   • CVA (cerebral vascular accident) (CMS-[facility])     left sided weakness   • DM II (diabetes mellitus, type II), controlled (CMS-[facility])    • TIA (transient ischemic attack)      Past Surgical History:   Procedure  Laterality Date   • TONSILLECTOMY       No family history on file.  Social History     Social History   • Marital status:      Spouse name: N/A   • Number of children: N/A   • Years of education: N/A     Occupational History   • Not on file.     Social History Main Topics   • Smoking status: Former Smoker   • Smokeless tobacco: Never Used   • Alcohol use Yes      Comment: occ   • Drug use: No   • Sexual activity: Not on file     Other Topics Concern   • Not on file     Social History Narrative   • No narrative on file     No Known Allergies  Outpatient Encounter Prescriptions as of 3/21/2018   Medication Sig Dispense Refill   • lisinopril (PRINIVIL) 10 MG Tab Take 1 Tab by mouth every day. 30 Tab 11   • apixaban (ELIQUIS) 5mg Tab Take 1 Tab by mouth 2 Times a Day. 60 Tab 1   • glyBURIDE (DIABETA) 5 MG Tab Take 1 Tab by mouth 2 times a day, with meals. 30 Tab 2   • atorvastatin (LIPITOR) 40 MG Tab Take 1 Tab by mouth every bedtime. 30 Tab 2   • furosemide (LASIX) 20 MG Tab Take 1 Tab by mouth 2 Times a Day. 60 Tab 2   • spironolactone (ALDACTONE) 25 MG Tab Take 1 Tab by mouth every day. 30 Tab 3   • metformin (GLUCOPHAGE) 1000 MG tablet Take 1 Tab by mouth 2 times a day, with meals. 60 Tab 2   • metoprolol SR (TOPROL XL) 100 MG TABLET SR 24 HR Take 1 Tab by mouth every evening. 30 Tab 2   • [DISCONTINUED] apixaban (ELIQUIS) 5mg Tab Take 1 Tab by mouth 2 Times a Day. 60 Tab 0     No facility-administered encounter medications on file as of 3/21/2018.      Review of Systems   Constitutional: Negative for fever and malaise/fatigue.   Respiratory: Negative for cough and shortness of breath.    Cardiovascular: Negative for chest pain, palpitations, orthopnea, claudication, leg swelling and PND.   Gastrointestinal: Negative for abdominal pain.   Musculoskeletal: Negative for myalgias.   Neurological: Negative for dizziness.   All other systems reviewed and are negative.       Objective:   /70   Pulse 94   Ht  "1.803 m (5' 11\")   Wt 93 kg (205 lb)   SpO2 100%   BMI 28.59 kg/m²     Physical Exam   Constitutional: He is oriented to person, place, and time. He appears well-developed and well-nourished.   HENT:   Head: Normocephalic and atraumatic.   Eyes: EOM are normal. Pupils are equal, round, and reactive to light.   Neck: Normal range of motion. Neck supple. No JVD present.   Cardiovascular: Normal rate, regular rhythm and normal heart sounds.    Pulmonary/Chest: Effort normal and breath sounds normal. No respiratory distress. He has no wheezes. He has no rales.   Musculoskeletal: He exhibits edema (Trace Left LE edema at ankle.).   Neurological: He is alert and oriented to person, place, and time.   Left sided weakness   Skin: Skin is warm and dry.   Psychiatric: He has a normal mood and affect. His behavior is normal.     No results found for: CHOLSTRLTOT, LDL, HDL, TRIGLYCERIDE    Lab Results   Component Value Date/Time    SODIUM 135 03/13/2018 05:47 PM    POTASSIUM 4.4 03/13/2018 05:47 PM    CHLORIDE 102 03/13/2018 05:47 PM    CO2 25 03/13/2018 05:47 PM    GLUCOSE 173 (H) 03/13/2018 05:47 PM    BUN 21 03/13/2018 05:47 PM    CREATININE 0.75 03/13/2018 05:47 PM     Lab Results   Component Value Date/Time    ALKPHOSPHAT 64 02/15/2018 08:04 AM    ASTSGOT 20 02/15/2018 08:04 AM    ALTSGPT 44 02/15/2018 08:04 AM    TBILIRUBIN 0.6 02/15/2018 08:04 AM        Transthoracic Echo Report 2/15/18  No prior study is available for comparison.   Severely reduced left ventricular systolic function.  Left ventricular ejection fraction is visually estimated to be 20%.  Diastolic function is difficult to asses.   Mild mitral regurgitation.  Unable to estimate pulmonary artery pressure due to an inadequate   tricuspid regurgitant jet.    Stress Test 2/17/18  1. Rest EKG shows normal sinus rhythm.  2. Stress EKG shows no significant ST segment changes. Occasional premature atrial contractions noted.  3. Chest pain was not experienced " during this test, however, shortness of breath was reported.  4. Lexiscan 0.4 mg was given.     Roland Avilez MD,[facility]   Signed by Roland Avilez M.D. on 2/17/2018  7:08 PM   Narrative   Myocardial Perfusion   Report   NUCLEAR IMAGING INTERPRETATION   No reversible defects that would indicate ischemia.    Non-reversible defect noted.    Severely reduced left ventricular systolic function.          Assessment:     1. ACC/AHA stage C systolic heart failure (CMS-HCC)  metoprolol SR (TOPROL XL) 100 MG TABLET SR 24 HR    furosemide (LASIX) 20 MG Tab    rosuvastatin (CRESTOR) 20 MG Tab   2. Heart failure, NYHA class 2 (CMS-HCC)  metoprolol SR (TOPROL XL) 100 MG TABLET SR 24 HR    furosemide (LASIX) 20 MG Tab    rosuvastatin (CRESTOR) 20 MG Tab   3. Bilateral pulmonary embolism (CMS-HCC)     4. Type 2 diabetes mellitus with complication, without long-term current use of insulin (CMS-[facility])  rosuvastatin (CRESTOR) 20 MG Tab       Medical Decision Making:  Today's Assessment / Status / Plan:   1. HFrEF, Stage C, class 2: Based on physical examination findings, patient is euvolemic. No JVD, lungs are clear to auscultation, no pitting edema in bilateral lower extremities, no ascites. Pt has trace LE edema on Left ankle/foot  -Will have patient restart Toprol-XL 50 mg daily (take at night)  -Will have patient decrease furosemide to 20 mg daily as needed for increased weight gain, swelling and shortness of breath. Patient alert office if this occurs  -Continue lisinopril 10 mg daily  -Continue spironolactone 25 mg daily    2. PE, Hx DVT:  -Continue Eliquis 5 mg BID    3. Diabetes:  -Followed by PCP  -Continue metformin and glyburide  -Patient to stop atorvastatin  -Start rosuvastatin 20 mg daily    FU in clinic in 2-3 weeks. Sooner if needed.    Patient verbalizes understanding and agrees with the plan of care.     Collaborating MD: Tana Guerrero MD

## 2018-03-22 ENCOUNTER — HOSPITAL ENCOUNTER (OUTPATIENT)
Dept: LAB | Facility: MEDICAL CENTER | Age: 58
End: 2018-03-22
Attending: FAMILY MEDICINE
Payer: MEDICARE

## 2018-03-22 ENCOUNTER — ANTICOAGULATION VISIT (OUTPATIENT)
Dept: VASCULAR LAB | Facility: MEDICAL CENTER | Age: 58
End: 2018-03-22
Attending: INTERNAL MEDICINE
Payer: MEDICARE

## 2018-03-22 VITALS — SYSTOLIC BLOOD PRESSURE: 131 MMHG | DIASTOLIC BLOOD PRESSURE: 72 MMHG | HEART RATE: 92 BPM

## 2018-03-22 DIAGNOSIS — Z86.711 HISTORY OF PULMONARY EMBOLISM: ICD-10-CM

## 2018-03-22 DIAGNOSIS — I82.4Y2 DVT, LOWER EXTREMITY, PROXIMAL, ACUTE, LEFT (HCC): ICD-10-CM

## 2018-03-22 DIAGNOSIS — I44.0 FIRST DEGREE AV BLOCK: ICD-10-CM

## 2018-03-22 LAB
CREAT UR-MCNC: 70.9 MG/DL
INR PPP: 1.3 (ref 2–3.5)
MICROALBUMIN UR-MCNC: 1.7 MG/DL
MICROALBUMIN/CREAT UR: 24 MG/G (ref 0–30)

## 2018-03-22 PROCEDURE — 82570 ASSAY OF URINE CREATININE: CPT

## 2018-03-22 PROCEDURE — 99213 OFFICE O/P EST LOW 20 MIN: CPT

## 2018-03-22 PROCEDURE — 82043 UR ALBUMIN QUANTITATIVE: CPT

## 2018-03-22 PROCEDURE — 85610 PROTHROMBIN TIME: CPT

## 2018-03-24 ENCOUNTER — TELEPHONE (OUTPATIENT)
Dept: VASCULAR LAB | Facility: MEDICAL CENTER | Age: 58
End: 2018-03-24

## 2018-03-25 NOTE — TELEPHONE ENCOUNTER
Initial anticoag note and most recent d/c summary reviewed.  Pending further recs from pcp we will continue indefinite anticoagulation with eliquis for recurrent DVT/PE  Will defer any indicated age appropriate screening for occult malignancy to pcp.    Michael Bloch, MD  Anticoagulation Clinic    Cc:      CHUCHO Falk MD

## 2018-03-26 NOTE — ASSESSMENT & PLAN NOTE
Patient with recent PE, found to be 2/2 LLE DVT.  Patient was thus started on Eliquis.  Patient states that he has also had CVA in the past, at [facility], which was also possibly due to a thromboembolic event.  Therefore, we will request records for further evaluation and management decision making.    Of note, patient did not have workup for malignancy nor hypercoagulable workup prior to discharge from [facility] facility.    Patient is not currently smoking.

## 2018-03-27 NOTE — PROGRESS NOTES
Subjective:   Chief Complaint/History of Present Illness:  Ryan Small is a 57 y.o. male patient new to [facility] who presents today to establish primary medical care, To discuss post hospitalization follow, as well as to discuss ongoing medical conditions.  PMH, PSH, Social History, Medications, Allergies all reviewed as documented:    History of pulmonary embolism  Patient with recent PE, found to be 2/2 LLE DVT.  Patient was thus started on Eliquis.  Patient states that he has also had CVA in the past, at [facility], which was also possibly due to a thromboembolic event.  Therefore, we will request records for further evaluation and management decision making.    Of note, patient did not have workup for malignancy nor hypercoagulable workup prior to discharge from [facility].    Patient is not currently smoking.      H/O: CVA (cerebrovascular accident)  Chronic, stable, controlled. Please see notes from same date of service 03/21/2018 Re: History of pulmonary embolism.    DVT, lower extremity, proximal, acute, left (CMS-HCC)  New problem, uncontrolled, please see notes from same date of service 3/21/2018 Re: History of pulmonary embolism.    Chronic anticoagulation  Patient is on chronic anticoagulation for pulmonary embolism, and may also to be on it for CVA of this turns out to be thromboembolic in etiology. We're requesting medical records from [facility].    Systolic CHF with reduced left ventricular function, NYHA class 3 (CMS-[facility])  Chronic, uncontrolled, most recent echocardiogram is from 2/15/2018 with LV ejection fraction 20%, and mild mitral regurgitation. Patient is being followed by cardiology. As patient most recently had some massive pulmonary embolisms, I have informed patient that he should be following closely with cardiology for repeat echocardiogram, as this is likely low due to that etiology.    Patient on appropriate medication with Lasix, beta  blocker, ACE inhibitor, and potassium sparing diuretic.    First degree AV block  Noted on recent EKG. Patient asymptomatic.    Type 2 diabetes mellitus (CMS-HCC)  We discussed that patient is diagnosed with diabetes, uncontrolled, given that the A1c is:   Lab Results   Component Value Date/Time    HBA1C 12.4 (H) 02/14/2018 12:45 PM        We discussed that prediabetes/diabetes is a medical condition of lifestyle habits (less than optimal dietary choices, insufficient cardiovascular exercise). Furthermore, we discussed that at present time it is better to pursue lifestyle changes rather than changing medications. Patient is in agreement. Please see review of systems as below.    ROS is NEGATIVE for blurred vision, polydipsia, polyuria, diaphoresis, palpitations, fatigue, irritability, flank pain, BLE paresthesias.      Patient Active Problem List    Diagnosis Date Noted   • History of pulmonary embolism 02/14/2018     Priority: High   • DVT, lower extremity, proximal, acute, left (CMS-HCC) 02/15/2018     Priority: Medium   • Acute respiratory failure with hypoxia (CMS-HCC) 02/14/2018     Priority: Medium   • H/O: CVA (cerebrovascular accident) 03/21/2018   • Chronic anticoagulation 03/21/2018   • First degree AV block 03/21/2018   • Type 2 diabetes mellitus (CMS-HCC) 02/15/2018   • Systolic CHF with reduced left ventricular function, NYHA class 3 (CMS-HCC) 02/15/2018       Additional History:   Allergies:    Patient has no known allergies.     Medications:     Current Outpatient Prescriptions Ordered in [facility]   Medication Sig Dispense Refill   • apixaban (ELIQUIS) 5mg Tab Take 1 Tab by mouth 2 Times a Day. 60 Tab 5   • metoprolol SR (TOPROL XL) 100 MG TABLET SR 24 HR Take 0.5 Tabs by mouth every evening. 45 Tab 3   • furosemide (LASIX) 20 MG Tab Take 1 Tab by mouth 1 time daily as needed. 30 Tab 11   • rosuvastatin (CRESTOR) 20 MG Tab Take 1 Tab by mouth every evening. 30 Tab 11   • lisinopril (PRINIVIL) 10 MG Tab  Take 1 Tab by mouth every day. 30 Tab 11   • spironolactone (ALDACTONE) 25 MG Tab Take 1 Tab by mouth every day. 30 Tab 3   • metformin (GLUCOPHAGE) 1000 MG tablet Take 1 Tab by mouth 2 times a day, with meals. 60 Tab 2     No current Epic-ordered facility-administered medications on file.         Past Medical History:     Past Medical History:   Diagnosis Date   • CVA (cerebral vascular accident) (CMS-HCC)     left sided weakness   • DM II (diabetes mellitus, type II), controlled (CMS-HCC)    • TIA (transient ischemic attack)         Past Surgical History:     Past Surgical History:   Procedure Laterality Date   • TONSILLECTOMY          Social History:     Social History   Substance Use Topics   • Smoking status: Former Smoker     Packs/day: 2.00     Years: 5.00   • Smokeless tobacco: Never Used   • Alcohol use Yes      Comment: occ, none since hospital discharge (02/2018)        Family History:     No family status information on file.      History reviewed. No pertinent family history.    ROS:     - Constitutional: Negative for fever, chills, unexpected weight change, and fatigue/generalized weakness.     - HEENT: Negative for headaches, vision changes, hearing changes, ear pain, ear discharge, rhinorrhea, sinus congestion, sore throat, and neck pain.      - Respiratory: Negative for cough, sputum production, chest congestion, dyspnea, wheezing, and crackles.      - Cardiovascular: Negative for chest pain, palpitations, orthopnea, and bilateral lower extremity edema.     - Gastrointestinal: Negative for heartburn, nausea, vomiting, abdominal pain, hematochezia, melena, diarrhea, constipation, and greasy/foul-smelling stools.     - Genitourinary: Negative for dysuria, polyuria, hematuria, pyuria, urinary urgency, and urinary incontinence.    - Musculoskeletal: Negative for myalgias, back pain, and joint pain.     - Skin: Negative for rash, itching, cyanotic skin color change.     - Neurological: Negative for  "dizziness, tingling, tremors, focal sensory deficit, focal weakness and headaches.     - Endo/Heme/Allergies: Does not bruise/bleed easily.     - Psychiatric/Behavioral: Negative for depression, suicidal/homicidal ideation and memory loss.      - NOTE: All other systems reviewed and are negative, except as in HPI.     Objective:   Physical Exam:    Vitals: Blood pressure 114/64, pulse 80, temperature 36.3 °C (97.4 °F), height 1.803 m (5' 11\"), weight 93.9 kg (207 lb), SpO2 96 %.   BMI: Body mass index is 28.87 kg/m².   General/Constitutional: Vitals as above, Well nourished, well developed male in no acute distress   Head/Eyes:  - Head is grossly normal & atraumatic  - Bilateral conjunctivae clear and not injected, bilateral EOMI, bilateral PERRL   ENT:   - Bilateral external ears grossly normal in appearance, external auditory canals clear & bilateral TMs visualized with appropriate cone of light reflex, hearing grossly intact  - External nares normal in appearance and without discharge/bleeding, bilateral turbinates non-erythematous/non-edematous and without discharge/bleeding  - Good dentition,  posterior oropharynx without erythema/edema/exudates  Neck: Neck supple, no masses, neck non-tender to palpation, no thyromegaly/goiter   Respiratory: No respiratory distress, bilateral lungs are clear to auscultation in all lung fields (anterior/lateral/posterior), no wheezing/rhonchi/rales   Cardiovascular: Regular rate and rhythm without murmur/gallops/rubs, distal pulses equal and 2+ bilaterally (radial, posterior tibial), no bilateral lower extremity edema   Gastrointestinal: Abdomen resonant to percussion, Bowel sounds present in all 4 quadrants, abdomen non-tender to light and deep palpation    MSK: Gait grossly normal & not antalgic, no tenderness to percussion of vertebral processes, no CVAT, no bilateral SI joint tenderness   Integumentary: No apparent rashes   Neuro: Gross motor movement intact in all 4 " extremities, Gross sensation intact to extremities and trunk, Gait grossly normal and not antalgic   Psych: Judgment grossly appropriate, no apparent depression/anxiety    Health Maintenance:     - I have requested previous records ([facility]), and will update accordingly.    Imaging/Labs:     - I have requested previous records ([facility]), and will update accordingly.    Assessment and Plan:   1. Encounter to establish care with new doctor  Patient establishing primary medical care with me    2. History of pulmonary embolism  3. H/O: CVA (cerebrovascular accident)  4. DVT, lower extremity, proximal, acute, left (CMS-HCC)  5. Chronic anticoagulation  Acute exacerbation of possible chronic hypercoagulable state, labs as below to evaluate.  Referral to anticoagulation monitoring clinic, and request previous records from [facility].   - REFERRAL TO ANTICOAGULATION MONITORING   - PROTEIN C/S PANEL FUNCTIONAL; Future   - FACTOR V LEIDEN MUTATION; Future   - PLATELET FUNCTION; Future   - HOMOCYSTEINE; Future   - ANTICARDIOLIPIN AB IGG,IGM,IGA; Future   - CBC WITH DIFFERENTIAL; Future    6. Systolic CHF with reduced left ventricular function, NYHA class 2 (CMS-HCC)  Chronic, unknown control, echocardiogram to evaluate   - ECHOCARDIOGRAM COMP W/O CONT; Future    7. First degree AV block  Chronic, stable, well-controlled, asymptomatic.  Continue to monitor.    8. Routine general medical examination at a health care facility  9. Type 2 diabetes mellitus with complication, without long-term current use of insulin (CMS-HCC)  10. Encounter for lipid screening for cardiovascular disease  Unknown control of metabolic health. Labs as below to evaluate.   - HEMOGLOBIN A1C; Future   - LIPID PROFILE; Future   - MICROALB/CREAT RATIO RAND. UR   - POCT Retinal Eye Exam    RTC: in 1-2months for labs review, coordination of care.    PLEASE NOTE: This dictation was created using voice recognition software. I have made every reasonable  attempt to correct obvious errors, but I expect that there are errors of grammar and possibly content that I did not discover before finalizing the note.

## 2018-03-27 NOTE — ASSESSMENT & PLAN NOTE
Chronic, uncontrolled, most recent echocardiogram is from 2/15/2018 with LV ejection fraction 20%, and mild mitral regurgitation. Patient is being followed by cardiology. As patient most recently had some massive pulmonary embolisms, I have informed patient that he should be following closely with cardiology for repeat echocardiogram, as this is likely low due to that etiology.    Patient on appropriate medication with Lasix, beta blocker, ACE inhibitor, and potassium sparing diuretic.

## 2018-03-27 NOTE — ASSESSMENT & PLAN NOTE
We discussed that patient is diagnosed with diabetes, uncontrolled, given that the A1c is:   Lab Results   Component Value Date/Time    HBA1C 12.4 (H) 02/14/2018 12:45 PM        We discussed that prediabetes/diabetes is a medical condition of lifestyle habits (less than optimal dietary choices, insufficient cardiovascular exercise). Furthermore, we discussed that at present time it is better to pursue lifestyle changes rather than changing medications. Patient is in agreement. Please see review of systems as below.    ROS is NEGATIVE for blurred vision, polydipsia, polyuria, diaphoresis, palpitations, fatigue, irritability, flank pain, BLE paresthesias.

## 2018-03-27 NOTE — ASSESSMENT & PLAN NOTE
Patient is on chronic anticoagulation for pulmonary embolism, and may also to be on it for CVA of this turns out to be thromboembolic in etiology. We're requesting medical records from [facility].

## 2018-03-27 NOTE — ASSESSMENT & PLAN NOTE
Chronic, stable, controlled. Please see notes from same date of service 03/21/2018 Re: History of pulmonary embolism.

## 2018-03-27 NOTE — ASSESSMENT & PLAN NOTE
New problem, uncontrolled, please see notes from same date of service 3/21/2018 Re: History of pulmonary embolism.

## 2018-05-01 ASSESSMENT — ENCOUNTER SYMPTOMS
MYALGIAS: 0
ABDOMINAL PAIN: 0
CLAUDICATION: 0
PND: 0
FEVER: 0
SHORTNESS OF BREATH: 0
PALPITATIONS: 0
ORTHOPNEA: 0
COUGH: 0
DIZZINESS: 0

## 2018-05-02 ENCOUNTER — OFFICE VISIT (OUTPATIENT)
Dept: MEDICAL GROUP | Facility: MEDICAL CENTER | Age: 58
End: 2018-05-02
Payer: MEDICARE

## 2018-05-02 ENCOUNTER — ANTICOAGULATION VISIT (OUTPATIENT)
Dept: VASCULAR LAB | Facility: MEDICAL CENTER | Age: 58
End: 2018-05-02
Attending: INTERNAL MEDICINE
Payer: MEDICARE

## 2018-05-02 ENCOUNTER — OFFICE VISIT (OUTPATIENT)
Dept: CARDIOLOGY | Facility: MEDICAL CENTER | Age: 58
End: 2018-05-02
Payer: MEDICARE

## 2018-05-02 VITALS
BODY MASS INDEX: 28.13 KG/M2 | HEART RATE: 68 BPM | SYSTOLIC BLOOD PRESSURE: 103 MMHG | HEIGHT: 71 IN | DIASTOLIC BLOOD PRESSURE: 58 MMHG

## 2018-05-02 VITALS
WEIGHT: 202.82 LBS | TEMPERATURE: 97 F | HEIGHT: 70 IN | SYSTOLIC BLOOD PRESSURE: 102 MMHG | HEART RATE: 72 BPM | BODY MASS INDEX: 29.04 KG/M2 | DIASTOLIC BLOOD PRESSURE: 72 MMHG | OXYGEN SATURATION: 97 %

## 2018-05-02 VITALS
DIASTOLIC BLOOD PRESSURE: 72 MMHG | BODY MASS INDEX: 28.22 KG/M2 | SYSTOLIC BLOOD PRESSURE: 108 MMHG | OXYGEN SATURATION: 98 % | WEIGHT: 201.6 LBS | HEIGHT: 71 IN | HEART RATE: 74 BPM

## 2018-05-02 DIAGNOSIS — E11.8 TYPE 2 DIABETES MELLITUS WITH COMPLICATION, WITHOUT LONG-TERM CURRENT USE OF INSULIN (HCC): ICD-10-CM

## 2018-05-02 DIAGNOSIS — I82.4Y2 DVT, LOWER EXTREMITY, PROXIMAL, ACUTE, LEFT (HCC): ICD-10-CM

## 2018-05-02 DIAGNOSIS — I50.20 SYSTOLIC CHF WITH REDUCED LEFT VENTRICULAR FUNCTION, NYHA CLASS 3 (HCC): ICD-10-CM

## 2018-05-02 DIAGNOSIS — Z86.711 HISTORY OF PULMONARY EMBOLISM: ICD-10-CM

## 2018-05-02 DIAGNOSIS — I42.9 CARDIOMYOPATHY, UNSPECIFIED TYPE (HCC): ICD-10-CM

## 2018-05-02 DIAGNOSIS — I44.0 FIRST DEGREE AV BLOCK: ICD-10-CM

## 2018-05-02 DIAGNOSIS — E11.9 CONTROLLED TYPE 2 DIABETES MELLITUS WITHOUT COMPLICATION, WITHOUT LONG-TERM CURRENT USE OF INSULIN (HCC): ICD-10-CM

## 2018-05-02 DIAGNOSIS — Z12.11 COLON CANCER SCREENING: ICD-10-CM

## 2018-05-02 DIAGNOSIS — L20.84 INTRINSIC ECZEMA: ICD-10-CM

## 2018-05-02 DIAGNOSIS — I26.99 BILATERAL PULMONARY EMBOLISM (HCC): ICD-10-CM

## 2018-05-02 DIAGNOSIS — I50.9 HEART FAILURE, NYHA CLASS 1 (HCC): ICD-10-CM

## 2018-05-02 DIAGNOSIS — I50.20 ACC/AHA STAGE C SYSTOLIC HEART FAILURE (HCC): ICD-10-CM

## 2018-05-02 PROBLEM — J96.01 ACUTE RESPIRATORY FAILURE WITH HYPOXIA (HCC): Status: RESOLVED | Noted: 2018-02-14 | Resolved: 2018-05-02

## 2018-05-02 LAB
HBA1C MFR BLD: 7.6 % (ref ?–5.8)
INR BLD: 1.2 (ref 0.9–1.2)
INR PPP: 1.2 (ref 2–3.5)
INT CON NEG: NEGATIVE
INT CON POS: POSITIVE

## 2018-05-02 PROCEDURE — 83036 HEMOGLOBIN GLYCOSYLATED A1C: CPT | Performed by: FAMILY MEDICINE

## 2018-05-02 PROCEDURE — 99214 OFFICE O/P EST MOD 30 MIN: CPT | Performed by: FAMILY MEDICINE

## 2018-05-02 PROCEDURE — 99212 OFFICE O/P EST SF 10 MIN: CPT | Performed by: PHARMACIST

## 2018-05-02 PROCEDURE — 85610 PROTHROMBIN TIME: CPT

## 2018-05-02 PROCEDURE — 99214 OFFICE O/P EST MOD 30 MIN: CPT | Performed by: NURSE PRACTITIONER

## 2018-05-02 RX ORDER — METOPROLOL SUCCINATE 100 MG/1
100 TABLET, EXTENDED RELEASE ORAL EVERY EVENING
Qty: 90 TAB | Refills: 3 | Status: SHIPPED | OUTPATIENT
Start: 2018-05-02 | End: 2024-01-25

## 2018-05-02 RX ORDER — TRIAMCINOLONE ACETONIDE 1 MG/G
1 CREAM TOPICAL 2 TIMES DAILY
Qty: 15 G | Refills: 1 | Status: SHIPPED | OUTPATIENT
Start: 2018-05-02 | End: 2018-08-15

## 2018-05-02 ASSESSMENT — PATIENT HEALTH QUESTIONNAIRE - PHQ9: CLINICAL INTERPRETATION OF PHQ2 SCORE: 0

## 2018-05-02 NOTE — PROGRESS NOTES
Chief Complaint   Patient presents with   • Congestive Heart Failure     HF est.       Subjective:   Ryan Small is a 57 y.o. male who presents today for follow up on his Heart Failure with his wife, Kimberly.    Patient of the Heart Failure Clinic and was last seen on 3/21/18. During that visit, Toprol XL was restarted at 50 mg daily (at night), Patient was to try to decrease furosemide to 20 mg as needed and he was started on rosuvastatin 20 mg daily. Pt reports he has not had to take any furosemide and he has noticed occasional muscle pains with rosuvastatin.     For his symptoms, pt feels well. Pt has not had any symptoms. Patient denies chest pain, palpitations, orthopnea, PND, dizziness/lightheadedness, shortness of breath at rest and with ADLs and exertion. 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    Patient reports his home weights have been decreasing his now weighing 191 pounds.    Additonally, patient has the following medical problems:    -History of cardiomyopathy, previously followed by [place] in [facility]    -Uncontrolled diabetes, being followed by PCP Dr. Falk. Patient taking metformin and glyburide. Patient refused insulin in the hospital    -Patient had a recent hospitalization from 2/14-17/2018 for submassive PE where he received TPA and had right heart strain. Echo showed EF 20%     -History of a CVA with left-sided weakness    -Chronic back pain    -Hx DVT of LLE    Past Medical History:   Diagnosis Date   • CVA (cerebral vascular accident) (HCC)     left sided weakness   • DM II (diabetes mellitus, type II), controlled ([facility])    • TIA (transient ischemic attack)      Past Surgical History:   Procedure Laterality Date   • TONSILLECTOMY       No family history on file.  Social History     Social History   • Marital status:      Spouse name: N/A   • Number of children: N/A   • Years of education: N/A     Occupational History   • Not on file.     Social History Main  "Topics   • Smoking status: Former Smoker     Packs/day: 2.00     Years: 5.00   • Smokeless tobacco: Never Used   • Alcohol use Yes      Comment: occ, none since hospital discharge (02/2018)   • Drug use: No   • Sexual activity: Yes     Partners: Female     Other Topics Concern   • Not on file     Social History Narrative   • No narrative on file     No Known Allergies  Outpatient Encounter Prescriptions as of 5/2/2018   Medication Sig Dispense Refill   • apixaban (ELIQUIS) 5mg Tab Take 1 Tab by mouth 2 Times a Day. 60 Tab 5   • metoprolol SR (TOPROL XL) 100 MG TABLET SR 24 HR Take 0.5 Tabs by mouth every evening. 45 Tab 3   • furosemide (LASIX) 20 MG Tab Take 1 Tab by mouth 1 time daily as needed. 30 Tab 11   • rosuvastatin (CRESTOR) 20 MG Tab Take 1 Tab by mouth every evening. 30 Tab 11   • lisinopril (PRINIVIL) 10 MG Tab Take 1 Tab by mouth every day. 30 Tab 11   • metformin (GLUCOPHAGE) 1000 MG tablet Take 1 Tab by mouth 2 times a day, with meals. 60 Tab 2   • spironolactone (ALDACTONE) 25 MG Tab Take 1 Tab by mouth every day. 30 Tab 3     No facility-administered encounter medications on file as of 5/2/2018.      Review of Systems   Constitutional: Negative for fever and malaise/fatigue.   Respiratory: Negative for cough and shortness of breath.    Cardiovascular: Negative for chest pain, palpitations, orthopnea, claudication, leg swelling and PND.   Gastrointestinal: Negative for abdominal pain.   Musculoskeletal: Negative for myalgias.   Neurological: Negative for dizziness.   All other systems reviewed and are negative.       Objective:   /72   Pulse 74   Ht 1.803 m (5' 11\")   Wt 91.4 kg (201 lb 9.6 oz)   SpO2 98%   BMI 28.12 kg/m²     Physical Exam   Constitutional: He is oriented to person, place, and time. He appears well-developed and well-nourished.   HENT:   Head: Normocephalic and atraumatic.   Eyes: EOM are normal. Pupils are equal, round, and reactive to light.   Neck: Normal range of " motion. Neck supple. No JVD present.   Cardiovascular: Normal rate, regular rhythm and normal heart sounds.    Pulmonary/Chest: Effort normal and breath sounds normal. No respiratory distress. He has no wheezes. He has no rales.   Musculoskeletal: He exhibits edema (Trace Left LE edema at ankle.).   Neurological: He is alert and oriented to person, place, and time.   Left sided weakness   Skin: Skin is warm and dry.   Psychiatric: He has a normal mood and affect. His behavior is normal.     No results found for: CHOLSTRLTOT, LDL, HDL, TRIGLYCERIDE    Lab Results   Component Value Date/Time    SODIUM 135 03/13/2018 05:47 PM    POTASSIUM 4.4 03/13/2018 05:47 PM    CHLORIDE 102 03/13/2018 05:47 PM    CO2 25 03/13/2018 05:47 PM    GLUCOSE 173 (H) 03/13/2018 05:47 PM    BUN 21 03/13/2018 05:47 PM    CREATININE 0.75 03/13/2018 05:47 PM     Lab Results   Component Value Date/Time    ALKPHOSPHAT 64 02/15/2018 08:04 AM    ASTSGOT 20 02/15/2018 08:04 AM    ALTSGPT 44 02/15/2018 08:04 AM    TBILIRUBIN 0.6 02/15/2018 08:04 AM        Transthoracic Echo Report 2/15/18  No prior study is available for comparison.   Severely reduced left ventricular systolic function.  Left ventricular ejection fraction is visually estimated to be 20%.  Diastolic function is difficult to asses.   Mild mitral regurgitation.  Unable to estimate pulmonary artery pressure due to an inadequate   tricuspid regurgitant jet.    Stress Test 2/17/18  1. Rest EKG shows normal sinus rhythm.  2. Stress EKG shows no significant ST segment changes. Occasional premature atrial contractions noted.  3. Chest pain was not experienced during this test, however, shortness of breath was reported.  4. Lexiscan 0.4 mg was given.     Roland Avilez MD,[facility]   Signed by Roland Avilez M.D. on 2/17/2018  7:08 PM   Narrative   Myocardial Perfusion   Report   NUCLEAR IMAGING INTERPRETATION   No reversible defects that would indicate ischemia.    Non-reversible defect noted.     Severely reduced left ventricular systolic function.          Assessment:     1. ACC/AHA stage C systolic heart failure (HCC)  metoprolol SR (TOPROL XL) 100 MG TABLET SR 24 HR    Echocardiogram Comp w/o Cont   2. Heart failure, NYHA class 1 (HCC)  metoprolol SR (TOPROL XL) 100 MG TABLET SR 24 HR    Echocardiogram Comp w/o Cont   3. Cardiomyopathy, unspecified type (HCC)  Echocardiogram Comp w/o Cont   4. Bilateral pulmonary embolism (HCC)     5. Type 2 diabetes mellitus with complication, without long-term current use of insulin (HCC)         Medical Decision Making:  Today's Assessment / Status / Plan:   1. HFrEF, Stage C, class 1: Based on physical examination findings, patient is euvolemic. No JVD, lungs are clear to auscultation, no pitting edema in bilateral lower extremities, no ascites. Pt has LE edema on Left ankle/foot  -Increase Toprol- mg daily (take at night)  -Continue furosemide 20 mg daily as needed for increased weight gain, swelling and shortness of breath. Patient alert office if this occurs  -Continue lisinopril 10 mg daily  -Continue spironolactone 25 mg daily  -Repeat Echo in 1 month  -Reinforced s/sx of worsening heart failure with patient and weight monitoring. Pt verbalizes understanding. Pt to call office or RTC if present.   -Discussed with patient if EF remains less than 35%, we will consider ICD for primary prevention, to optimize his heart failure regimen    2. PE, Hx DVT:  -Continue Eliquis 5 mg BID    3. Diabetes:  -Followed by PCP  -Continue metformin and glyburide  -Continue rosuvastatin 20 mg daily (pt ok to continue, PCP to recheck lipid for next visit)    FU in clinic in 4 weeks with Dr. Gonzalez after Echo. Sooner if needed.    Patient verbalizes understanding and agrees with the plan of care.     Collaborating MD: Andre Michael MD

## 2018-05-02 NOTE — PROGRESS NOTES
"   Target end date: Indefinite     Indication: Recurrent DVT/PE     Drug: Eliquis 5mg BID    Health Status Since Last Assessment   Patient denies any new relevant medical problems, ED visits or hospitalizations   Patient denies any embolic events (stroke/tia/systemic embolism)    Adherence with DOAC Therapy   Pt has missed 2 doses at night aver the past month    Bleeding Risk Assessment     Denies Epistaxis   Pt denies any excessive or unusual bleeding/hematomas.  Pt denies any GI bleeds or hematemesis.  Pt denies any concerning daily headache or sub dural hematoma symptoms.     Pt denies any hematuria or abnormal vaginal bleeding.   Latest Hemoglobin 2/16/18 = 13.1 g/dL   ETOH overuse - maybe 2 beers a week     Creatinine Clearance/Renal Function     Latest ClCr > 100 ml/min     Drug Interactions   Platelets: no   ASA/other antiplatelets no   NSAID no   Other drug interactions no   Verified no anticonvulsant or azole therapy, education provided for future use.     Examination  Ambulatory Vitals  Encounter Vitals  Blood Pressure: 103/58  Pulse: 68  Height: 180.3 cm (5' 10.98\")     Symptomatic hypotension denies   Significant gait impairment/imbalance/high risk for falls? no    Final Assessment and Recommendations:   Patient appears stable from the anticoagulation staindpoint.     Benefits of continued DOAC therapy outweigh risks for this patient   Recommend pt continue with current DOAC therapy    Pt has yet to apply for the [employer] cost program, encouraged him to do so. Discussed ways to improve compliance with PM dose. Pt feels that this shouldn't be a problem moving forward. Denies any issues with the medication. Provide samples.      Other Actions: cmp/ cbc hemogram ordered prior to next visit    Follow up:   Will follow up with patient via telephone in 6 months.  I have added this patient to my list for follow up.     Serena Cohn, PharmD        "

## 2018-05-06 ENCOUNTER — PATIENT MESSAGE (OUTPATIENT)
Dept: MEDICAL GROUP | Facility: MEDICAL CENTER | Age: 58
End: 2018-05-06

## 2018-05-06 PROBLEM — E11.3311: Status: ACTIVE | Noted: 2018-02-15

## 2018-05-06 PROBLEM — E13.3299: Status: ACTIVE | Noted: 2018-05-06

## 2018-05-07 NOTE — PROGRESS NOTES
Subjective:   Chief Complaint/History of Present Illness:  Ryan Small is a 57 y.o. male established patient who presents today to discuss medical problems as listed below    Diagnoses of Controlled type 2 diabetes mellitus without complication, without long-term current use of insulin (HCC), DVT, lower extremity, proximal, acute, left (HCC), History of pulmonary embolism, Systolic CHF with reduced left ventricular function, NYHA class 3 (HCC), Intrinsic eczema, and Colon cancer screening were pertinent to this visit.    Controlled type 2 diabetes mellitus with right eye affected by moderate nonproliferative retinopathy and macular edema, without long-term current use of insulin (HCC)  We discussed that patient is diagnosed with diabetes, nearly well-controlled, given that the A1c is:   Lab Results   Component Value Date/Time    HBA1C 7.6 05/02/2018 11:23 AM        Patient is currently taking (Metformin) for glycemic control, and (Lisinopril, Rosuvastatin) for health maintenance related to diabetes mellitus.    We discussed that prediabetes/diabetes mellitus type 2 are medical conditions of lifestyle habits (less than optimal dietary choices, insufficient cardiovascular exercise), and that they can be controlled (in part or in whole) by these lifestyle changes.     Patient has changed his lifestyle greatly, with both changes to nutrition as well as exercise.  Therefore, we discussed that at present time it is better to pursue lifestyle changes rather than changing medications. Patient is in agreement.     Please see assessment/plan as below for further details.    ROS is NEGATIVE for blurred vision, polydipsia, polyuria, diaphoresis, palpitations, fatigue, irritability, flank pain, BLE paresthesias.    DVT, lower extremity, proximal, acute, left (CMS-HCC) (HCC)  April, uncontrolled, and patient has not completed the lab workup that I ordered at last visit. Apparently, patient was unclear on how to address  this situation.    We reviewed the recommendations from Dr. jackson with whom he met on March 24, 2018, the patient is to continue on indefinite anticoagulation with eliquis for recurrent DVT/PE.    History of pulmonary embolism  New problem for evaluation, uncontrolled, please see notes from same date of service 5/2/2018 Re: DVT    Systolic CHF with reduced left ventricular function, NYHA class 3 (CMS-HCC) ([facility])  Chronic, uncontrolled, under appropriate care by cardiologist. Patient saw cardiologist earlier today, who recommended to continue present management, as well as follow-up with echo in approximately one month.    Patient reports taking medication as directed    ROS is NEGATIVE for dizziness, generalized weakness/fatigue, cold sweats, dizziness,  vision/hearing changes, jaw pain/paresthesias, BUE pain/paresthesias/numbness/weakness, chest pain/pressure, palpitations, dyspnea, nausea, RUQ abdominal pain, oliguria/anuria, BLE edema.    Intrinsic eczema  Chronic, however uncontrolled. Patient requesting treatment.      Patient Active Problem List    Diagnosis Date Noted   • History of pulmonary embolism 02/14/2018     Priority: High   • DVT, lower extremity, proximal, acute, left ([facility]) 02/15/2018     Priority: Medium   • Nonproliferative retinopathy due to secondary diabetes mellitus ([facility]) 05/06/2018   • Intrinsic eczema 05/02/2018   • H/O: CVA (cerebrovascular accident) 03/21/2018   • Chronic anticoagulation 03/21/2018   • First degree AV block 03/21/2018   • Controlled type 2 diabetes mellitus with right eye affected by moderate nonproliferative retinopathy and macular edema, without long-term current use of insulin ([facility]) 02/15/2018   • Systolic CHF with reduced left ventricular function, NYHA class 3 ([facility]) 02/15/2018       Additional History:   Allergies:    Patient has no known allergies.     Current Medications:     Current Outpatient Prescriptions   Medication Sig Dispense Refill   • metoprolol SR (TOPROL  "XL) 100 MG TABLET SR 24 HR Take 1 Tab by mouth every evening. 90 Tab 3   • triamcinolone acetonide (KENALOG) 0.1 % Cream Apply 1 Application to affected area(s) 2 times a day. 15 g 1   • apixaban (ELIQUIS) 5mg Tab Take 1 Tab by mouth 2 Times a Day. 60 Tab 5   • furosemide (LASIX) 20 MG Tab Take 1 Tab by mouth 1 time daily as needed. 30 Tab 11   • rosuvastatin (CRESTOR) 20 MG Tab Take 1 Tab by mouth every evening. 30 Tab 11   • lisinopril (PRINIVIL) 10 MG Tab Take 1 Tab by mouth every day. 30 Tab 11   • spironolactone (ALDACTONE) 25 MG Tab Take 1 Tab by mouth every day. 30 Tab 3   • metformin (GLUCOPHAGE) 1000 MG tablet Take 1 Tab by mouth 2 times a day, with meals. 60 Tab 2     No current facility-administered medications for this visit.         Social History:     Social History   Substance Use Topics   • Smoking status: Former Smoker     Packs/day: 2.00     Years: 5.00   • Smokeless tobacco: Never Used   • Alcohol use Yes      Comment: occ, none since hospital discharge (02/2018)       ROS:     - NOTE: All other systems reviewed and are negative, except as in HPI.     Objective:   Physical Exam:    Vitals: Blood pressure 102/72, pulse 72, temperature 36.1 °C (97 °F), height 1.778 m (5' 10\"), weight 92 kg (202 lb 13.2 oz), SpO2 97 %.   BMI: Body mass index is 29.1 kg/m².   General/Constitutional: Vitals as above, Well nourished, well developed male in no acute distress   Head/Eyes: Head is grossly normal & atraumatic, bilateral conjunctivae clear and not injected, bilateral EOMI, bilateral PERRL   ENT: Bilateral external ears grossly normal in appearance, Hearing grossly intact, External nares normal in appearance and without discharge/bleeding   Respiratory: No respiratory distress, bilateral lungs are clear to ausculation in all lung fields (anterior/lateral/posterior), no wheezing/rhonchi/rales   Cardiovascular: Regular rate and rhythm without murmur/gallops/rubs, distal pulses are intact and equal bilaterally " (radial, posterior tibial), no bilateral lower extremity edema   MSK: Gait grossly normal & not antalgic   Integumentary: Patient with dry cracked skin that is mildly erythematous, otherwise No apparent rashes   Psych: Judgment grossly appropriate, no apparent depression/anxiety    Health Maintenance:     - Diabetic retinal exam performed today, monofilament exam will be performed at next visit    - Patient to look for immunization records at home    Imaging/Labs:     - 03/22/18 -- urine microalbumin to creatinine ratio within normal limits    Assessment and Plan:   1. Controlled type 2 diabetes mellitus without complication, without long-term current use of insulin (HCC)  Chronic, uncontrolled, however much improved from last time. Continue metformin, continue with such changes.   - POCT  A1C    2. DVT, lower extremity, proximal, acute, left ([facility])  3. History of pulmonary embolism  Chronic, uncontrolled, however asymptomatic on appropriate therapy. Patient to continue with liquids indefinitely.    4. Systolic CHF with reduced left ventricular function, NYHA class 3 ([facility])  Chronic, stable, well-controlled.  Continue medications as directed by cardiologist.    5. Intrinsic eczema  New problem for medical evaluation, uncontrolled, treat with topical steroid cream as below.   - triamcinolone acetonide (KENALOG) 0.1 % Cream; Apply 1 Application to affected area(s) 2 times a day.  Dispense: 15 g; Refill: 1    6. Colon cancer screening  Unknown control, Referral to GI for further evaluation/management.   - REFERRAL TO GI FOR COLONOSCOPY    RTC: In 3 months for Medicare annual wellness visit.    PLEASE NOTE: This dictation was created using voice recognition software. I have made every reasonable attempt to correct obvious errors, but I expect that there are errors of grammar and possibly content that I did not discover before finalizing the note.

## 2018-05-07 NOTE — ASSESSMENT & PLAN NOTE
Chronic, uncontrolled, under appropriate care by cardiologist. Patient saw cardiologist earlier today, who recommended to continue present management, as well as follow-up with echo in approximately one month.    Patient reports taking medication as directed    ROS is NEGATIVE for dizziness, generalized weakness/fatigue, cold sweats, dizziness,  vision/hearing changes, jaw pain/paresthesias, BUE pain/paresthesias/numbness/weakness, chest pain/pressure, palpitations, dyspnea, nausea, RUQ abdominal pain, oliguria/anuria, BLE edema.

## 2018-05-07 NOTE — PROGRESS NOTES
"Heart Failure New Appointment     Patient in wrong appointment type initially, 6MWT and MLWHF at next visit.    6MWT- as above  MLWHF- as above    OP Heart Failure  Vitals  Appointment Type: Heart Failure New  Weight: 96.6 kg (213 lb)  Height: 177.8 cm (5' 10\")  BMI (Calculated): 30.56  Blood Pressure: 114/80  Pulse: 64    System Assessment  NYHA Functional Class Assessment:  (per MD-unspec CM)     Smoking Hx  Have you Ever Smoked: Never     Alcohol Hx  Do you Drink?: No    Illicit Drug Hx  Illicit Drug History: No    Social Hx  Social History: Lives with Spouse  Level of Support: Good  Advance Directives: Began discussion, Paperwork provided    Education  Symptoms: Verbalizes understanding  Weighing: Verbalizes Understanding  Weight Gain Response: Verbalizes Understanding   Recording Data: Verbalizes understanding  Teach Back Failures: Teach Back Successful  Compliance: Patient is Compliant     Medications  Medication Reconciliation : Complete  Medication Counseling Provided: Needs Reinforcement  Able to Accurately Identify Medication Indications: Some  Medication Discrepancies: None  Is Patient on an Evidence Based Beta Blocker: Yes  Is Patient on ACE-1 or ARB: Yes    Dietary Assessment  Food Labels: Verbalizes Understanding  Foods High in Sodium: Verbalizes Understanding  Daily Sodium Intake: Verbalizes Understanding  Diet: Verbalizes Understanding  Food Preparation: Verbalizes Understanding  Eating Out Plan: Verbalizes understanding  Healthier Options: Verbalizes Understanding  Fluid Restriction: Not Applicable    MN Living with Heart Failure  Swelling in Ankles or Legs:  (to be done at next visit-in wrong appointment type)    6 Minute Walk Test  Baseline to end of test: same as above          Education Narrative  Reviewed anatomy and physiology of heart failure with patient. Went over heart failure worksheet and patient's individual HF diagnosis, EF, risk factors, general medication classes and indications, as " well as personal goals.  Goals: Patient's primary goal is to improve his EF.     Discussed daily weights, sodium restriction, worsening signs and symptoms to report to physician, heart medications, and importance of adherence to medication regimen. Emphasized recommendation from AHA/AAHFN to keep daily sodium intake between 1500mg-2000mg.     Reviewed dietary handouts, advanced care planning classes, and advance directive planning handout. Discussed dietary considerations and reviewed Seven Day Heart Healthy Meal Plan by [employer].    Invited patient and family members/friends to HF support group and encouraged patient to call Heart Failure clinic during normal business hours with any questions.  Heart Failure program card with number given to patient.        Patient states full understanding of all information given.     Danay HF RN  [id number]

## 2018-05-07 NOTE — ASSESSMENT & PLAN NOTE
April, uncontrolled, and patient has not completed the lab workup that I ordered at last visit. Apparently, patient was unclear on how to address this situation.    We reviewed the recommendations from Dr. jackson with whom he met on March 24, 2018, the patient is to continue on indefinite anticoagulation with eliquis for recurrent DVT/PE.

## 2018-05-07 NOTE — ASSESSMENT & PLAN NOTE
New problem for evaluation, uncontrolled, please see notes from same date of service 5/2/2018 Re: DVT

## 2018-05-07 NOTE — ASSESSMENT & PLAN NOTE
We discussed that patient is diagnosed with diabetes, nearly well-controlled, given that the A1c is:   Lab Results   Component Value Date/Time    HBA1C 7.6 05/02/2018 11:23 AM        Patient is currently taking (Metformin) for glycemic control, and (Lisinopril, Rosuvastatin) for health maintenance related to diabetes mellitus.    We discussed that prediabetes/diabetes mellitus type 2 are medical conditions of lifestyle habits (less than optimal dietary choices, insufficient cardiovascular exercise), and that they can be controlled (in part or in whole) by these lifestyle changes.     Patient has changed his lifestyle greatly, with both changes to nutrition as well as exercise.  Therefore, we discussed that at present time it is better to pursue lifestyle changes rather than changing medications. Patient is in agreement.     Please see assessment/plan as below for further details.    ROS is NEGATIVE for blurred vision, polydipsia, polyuria, diaphoresis, palpitations, fatigue, irritability, flank pain, BLE paresthesias.

## 2018-05-12 ENCOUNTER — PATIENT MESSAGE (OUTPATIENT)
Dept: MEDICAL GROUP | Facility: MEDICAL CENTER | Age: 58
End: 2018-05-12

## 2018-05-12 DIAGNOSIS — E11.3311: ICD-10-CM

## 2018-06-01 ENCOUNTER — OFFICE VISIT (OUTPATIENT)
Dept: CARDIOLOGY | Facility: MEDICAL CENTER | Age: 58
End: 2018-06-01
Payer: MEDICARE

## 2018-06-01 ENCOUNTER — HOSPITAL ENCOUNTER (OUTPATIENT)
Dept: LAB | Facility: MEDICAL CENTER | Age: 58
End: 2018-06-01
Attending: FAMILY MEDICINE
Payer: MEDICARE

## 2018-06-01 ENCOUNTER — HOSPITAL ENCOUNTER (OUTPATIENT)
Dept: CARDIOLOGY | Facility: MEDICAL CENTER | Age: 58
End: 2018-06-01
Attending: NURSE PRACTITIONER
Payer: MEDICARE

## 2018-06-01 VITALS
SYSTOLIC BLOOD PRESSURE: 92 MMHG | HEART RATE: 84 BPM | BODY MASS INDEX: 28.92 KG/M2 | OXYGEN SATURATION: 98 % | DIASTOLIC BLOOD PRESSURE: 58 MMHG | HEIGHT: 70 IN | WEIGHT: 202 LBS

## 2018-06-01 DIAGNOSIS — Z86.711 HISTORY OF PULMONARY EMBOLISM: ICD-10-CM

## 2018-06-01 DIAGNOSIS — I42.9 CARDIOMYOPATHY, UNSPECIFIED TYPE (HCC): ICD-10-CM

## 2018-06-01 DIAGNOSIS — I50.21 NYHA CLASS 2 AND ACA/AHA STAGE C ACUTE SYSTOLIC CONGESTIVE HEART FAILURE: ICD-10-CM

## 2018-06-01 DIAGNOSIS — E11.8 TYPE 2 DIABETES MELLITUS WITH COMPLICATION, WITHOUT LONG-TERM CURRENT USE OF INSULIN (HCC): ICD-10-CM

## 2018-06-01 DIAGNOSIS — I82.4Y2 DVT, LOWER EXTREMITY, PROXIMAL, ACUTE, LEFT (HCC): ICD-10-CM

## 2018-06-01 DIAGNOSIS — E78.5 DYSLIPIDEMIA: ICD-10-CM

## 2018-06-01 DIAGNOSIS — I50.20 ACC/AHA STAGE C SYSTOLIC HEART FAILURE (HCC): ICD-10-CM

## 2018-06-01 DIAGNOSIS — Z86.73 H/O: CVA (CEREBROVASCULAR ACCIDENT): ICD-10-CM

## 2018-06-01 DIAGNOSIS — Z13.6 ENCOUNTER FOR LIPID SCREENING FOR CARDIOVASCULAR DISEASE: ICD-10-CM

## 2018-06-01 DIAGNOSIS — Z79.01 CHRONIC ANTICOAGULATION: ICD-10-CM

## 2018-06-01 DIAGNOSIS — Z13.220 ENCOUNTER FOR LIPID SCREENING FOR CARDIOVASCULAR DISEASE: ICD-10-CM

## 2018-06-01 DIAGNOSIS — I50.9 HEART FAILURE, NYHA CLASS 1 (HCC): ICD-10-CM

## 2018-06-01 LAB
BASOPHILS # BLD AUTO: 1 % (ref 0–1.8)
BASOPHILS # BLD: 0.09 K/UL (ref 0–0.12)
CHOLEST SERPL-MCNC: 132 MG/DL (ref 100–199)
EOSINOPHIL # BLD AUTO: 0.55 K/UL (ref 0–0.51)
EOSINOPHIL NFR BLD: 6 % (ref 0–6.9)
ERYTHROCYTE [DISTWIDTH] IN BLOOD BY AUTOMATED COUNT: 47.7 FL (ref 35.9–50)
EST. AVERAGE GLUCOSE BLD GHB EST-MCNC: 174 MG/DL
HBA1C MFR BLD: 7.7 % (ref 0–5.6)
HCT VFR BLD AUTO: 40.2 % (ref 42–52)
HCYS SERPL-SCNC: 17.32 UMOL/L
HDLC SERPL-MCNC: 39 MG/DL
HGB BLD-MCNC: 13.4 G/DL (ref 14–18)
IMM GRANULOCYTES # BLD AUTO: 0.07 K/UL (ref 0–0.11)
IMM GRANULOCYTES NFR BLD AUTO: 0.8 % (ref 0–0.9)
LDLC SERPL CALC-MCNC: 72 MG/DL
LV EJECT FRACT  99904: 48
LV EJECT FRACT MOD 2C 99903: 44.77
LV EJECT FRACT MOD 4C 99902: 48.14
LV EJECT FRACT MOD BP 99901: 48.64
LYMPHOCYTES # BLD AUTO: 2.76 K/UL (ref 1–4.8)
LYMPHOCYTES NFR BLD: 29.9 % (ref 22–41)
MCH RBC QN AUTO: 30.7 PG (ref 27–33)
MCHC RBC AUTO-ENTMCNC: 33.3 G/DL (ref 33.7–35.3)
MCV RBC AUTO: 92 FL (ref 81.4–97.8)
MEDICATIONS NOTED 1688: ABNORMAL
MONOCYTES # BLD AUTO: 0.86 K/UL (ref 0–0.85)
MONOCYTES NFR BLD AUTO: 9.3 % (ref 0–13.4)
NEUTROPHILS # BLD AUTO: 4.91 K/UL (ref 1.82–7.42)
NEUTROPHILS NFR BLD: 53 % (ref 44–72)
NRBC # BLD AUTO: 0 K/UL
NRBC BLD-RTO: 0 /100 WBC
PLATELET # BLD AUTO: 358 K/UL (ref 164–446)
PLT FUNCTION COL/EPI  1661: 55 SEC (ref 83–170)
PMV BLD AUTO: 10.1 FL (ref 9–12.9)
RBC # BLD AUTO: 4.37 M/UL (ref 4.7–6.1)
TRIGL SERPL-MCNC: 107 MG/DL (ref 0–149)
WBC # BLD AUTO: 9.2 K/UL (ref 4.8–10.8)

## 2018-06-01 PROCEDURE — 36415 COLL VENOUS BLD VENIPUNCTURE: CPT | Mod: GA

## 2018-06-01 PROCEDURE — 86147 CARDIOLIPIN ANTIBODY EA IG: CPT

## 2018-06-01 PROCEDURE — 80061 LIPID PANEL: CPT

## 2018-06-01 PROCEDURE — 85576 BLOOD PLATELET AGGREGATION: CPT

## 2018-06-01 PROCEDURE — 85303 CLOT INHIBIT PROT C ACTIVITY: CPT

## 2018-06-01 PROCEDURE — 93306 TTE W/DOPPLER COMPLETE: CPT | Mod: 26 | Performed by: INTERNAL MEDICINE

## 2018-06-01 PROCEDURE — 83090 ASSAY OF HOMOCYSTEINE: CPT

## 2018-06-01 PROCEDURE — 99214 OFFICE O/P EST MOD 30 MIN: CPT | Performed by: INTERNAL MEDICINE

## 2018-06-01 PROCEDURE — 85025 COMPLETE CBC W/AUTO DIFF WBC: CPT

## 2018-06-01 PROCEDURE — 85306 CLOT INHIBIT PROT S FREE: CPT

## 2018-06-01 PROCEDURE — 93306 TTE W/DOPPLER COMPLETE: CPT

## 2018-06-01 PROCEDURE — 83036 HEMOGLOBIN GLYCOSYLATED A1C: CPT | Mod: GA

## 2018-06-01 PROCEDURE — 81241 F5 GENE: CPT

## 2018-06-01 ASSESSMENT — MINNESOTA LIVING WITH HEART FAILURE QUESTIONNAIRE (MLHF)
WORKING AROUND THE HOUSE OR YARD DIFFICULT: 0
DIFFICULTY SLEEPING WELL AT NIGHT: 0
COSTING YOU MONEY FOR MEDICAL CARE: 4
SWELLING IN ANKLES OR LEGS: 1
WALKING ABOUT OR CLIMBING STAIRS DIFFICULT: 0
DIFFICULTY SOCIALIZING WITH FAMILY OR FRIENDS: 0
DIFFICULTY TO CONCENTRATE OR REMEMBERING THINGS: 1
FEELING LIKE A BURDEN TO FAMILY AND FRIENDS: 0
MAKING YOU STAY IN A HOSPITAL: 0
DIFFICULTY GOING AWAY FROM HOME: 0
GIVING YOU SIDE EFFECTS FROM TREATMENTS: 2
MAKING YOU WORRY: 0
EATING LESS FOODS YOU LIKE: 0
DIFFICULTY WITH RECREATIONAL PASTIMES, SPORTS, HOBBIES: 3
TOTAL_SCORE: 15
MAKING YOU FEEL DEPRESSED: 0
TIRED, FATIGUED OR LOW ON ENERGY: 0
HAVING TO SIT OR LIE DOWN DURING THE DAY: 0
MAKING YOU SHORT OF BREATH: 1
LOSS OF SELF CONTROL IN YOUR LIFE: 0
DIFFICULTY WORKING TO EARN A LIVING: 0
DIFFICULTY WITH SEXUAL ACTIVITIES: 3

## 2018-06-01 ASSESSMENT — 6 MINUTE WALK TEST (6MWT): TOTAL DISTANCE WALKED (METERS): 329

## 2018-06-01 NOTE — PROGRESS NOTES
Chief Complaint   Patient presents with   • Pulmonary Embolism     F/V: NEEDS NEW 6MWT       Subjective:   Ryan Small is a 57 y.o. male who presents today for follow-up post a recent admission for massive bilateral pulmonary emboli and recurrent cardiomyopathy with an ejection fraction of approximately 20%.  He has undergone a repeat echocardiogram.    He denies any dyspnea on exertion on a flat walk.  However, he was only able to complete approximately 330 m on his 6 minute walk test.  That would be consistent with functional class II.  He has had no PND, orthopnea or significant edema.  He occasionally notes some dependent edema.  He has had no chest discomfort, palpitations or lightheadedness.    Past Medical History:   Diagnosis Date   • CVA (cerebral vascular accident) ([facility])     left sided weakness   • DM II (diabetes mellitus, type II), controlled (HCC)    • TIA (transient ischemic attack)      Past Surgical History:   Procedure Laterality Date   • TONSILLECTOMY       History reviewed. No pertinent family history.  Social History     Social History   • Marital status:      Spouse name: N/A   • Number of children: N/A   • Years of education: N/A     Occupational History   • Not on file.     Social History Main Topics   • Smoking status: Former Smoker     Packs/day: 2.00     Years: 5.00   • Smokeless tobacco: Never Used   • Alcohol use Yes      Comment: occ, none since hospital discharge (02/2018)   • Drug use: No   • Sexual activity: Yes     Partners: Female     Other Topics Concern   • Not on file     Social History Narrative   • No narrative on file     No Known Allergies  Outpatient Encounter Prescriptions as of 6/1/2018   Medication Sig Dispense Refill   • metformin (GLUCOPHAGE) 1000 MG tablet Take 1 Tab by mouth 2 times a day, with meals. 180 Tab 2   • metoprolol SR (TOPROL XL) 100 MG TABLET SR 24 HR Take 1 Tab by mouth every evening. 90 Tab 3   • triamcinolone acetonide (KENALOG) 0.1 %  "Cream Apply 1 Application to affected area(s) 2 times a day. 15 g 1   • apixaban (ELIQUIS) 5mg Tab Take 1 Tab by mouth 2 Times a Day. 60 Tab 5   • furosemide (LASIX) 20 MG Tab Take 1 Tab by mouth 1 time daily as needed. 30 Tab 11   • rosuvastatin (CRESTOR) 20 MG Tab Take 1 Tab by mouth every evening. 30 Tab 11   • lisinopril (PRINIVIL) 10 MG Tab Take 1 Tab by mouth every day. 30 Tab 11   • spironolactone (ALDACTONE) 25 MG Tab Take 1 Tab by mouth every day. 30 Tab 3     No facility-administered encounter medications on file as of 6/1/2018.      ROS     Objective:   BP (!) 92/58   Pulse 84   Ht 1.778 m (5' 10\")   Wt 91.6 kg (202 lb)   SpO2 98%   BMI 28.98 kg/m²     Physical Exam   Constitutional: He appears well-developed and well-nourished.   Neck: No JVD present.   Cardiovascular: Normal rate and regular rhythm.    No murmur heard.  Pulmonary/Chest: Effort normal and breath sounds normal. He has no rales.   Abdominal: Soft. There is no tenderness.   Musculoskeletal: He exhibits edema (Trace to 1+ left pretibial).       No results found for: CHOLSTRLTOT, LDL, HDL, TRIGLYCERIDE    Lab Results   Component Value Date/Time    SODIUM 135 03/13/2018 05:47 PM    POTASSIUM 4.4 03/13/2018 05:47 PM    CHLORIDE 102 03/13/2018 05:47 PM    CO2 25 03/13/2018 05:47 PM    GLUCOSE 173 (H) 03/13/2018 05:47 PM    BUN 21 03/13/2018 05:47 PM    CREATININE 0.75 03/13/2018 05:47 PM     Lab Results   Component Value Date/Time    ALKPHOSPHAT 64 02/15/2018 08:04 AM    ASTSGOT 20 02/15/2018 08:04 AM    ALTSGPT 44 02/15/2018 08:04 AM    TBILIRUBIN 0.6 02/15/2018 08:04 AM      Lab Results   Component Value Date/Time    BNPBTYPENAT 327 (H) 02/14/2018 12:45 PM            Transthoracic  Echo Report  CONCLUSIONS  Mildly reduced left ventricular systolic function.  Left ventricular ejection fraction is visually estimated to be 48%.  Aortic sclerosis without stenosis.  Unable to estimate pulmonary artery pressure due to an inadequate   tricuspid " regurgitant jet.    JUNAID NAVARRETE  Exam Date:         06/01/2018           Assessment:     1. NYHA class 2 and KALYN/AHA stage C acute systolic congestive heart failure (HCC)     2. Chronic anticoagulation     3. History of pulmonary embolism         Medical Decision Making:  Today's Assessment / Status / Plan:     Mr. Navarrete is clinically stable.  He has had a marked improvement of his left ventricular systolic function which is now low normal to minimally reduced.  He is tolerating his medications well.  I feel his reduced functional capacity is probably due to poor physical conditioning.  I would like him to walk for at least 30 minutes 3 times a week and be mildly dyspneic.  He will follow-up in about 6 months with repeat lab work.

## 2018-06-01 NOTE — LETTER
[facility]   [address], [address]  [place] [address]  Phone: [phone]  Fax: [phone]              Ryan Small  1960    Encounter Date: 6/1/2018    Michel Gonzalez M.D.          PROGRESS NOTE:  Chief Complaint   Patient presents with   • Pulmonary Embolism     F/V: NEEDS NEW 6MWT       Subjective:   Ryan mSall is a 57 y.o. male who presents today for follow-up post a recent admission for massive bilateral pulmonary emboli and recurrent cardiomyopathy with an ejection fraction of approximately 20%.  He has undergone a repeat echocardiogram.    He denies any dyspnea on exertion on a flat walk.  However, he was only able to complete approximately 330 m on his 6 minute walk test.  That would be consistent with functional class II.  He has had no PND, orthopnea or significant edema.  He occasionally notes some dependent edema.  He has had no chest discomfort, palpitations or lightheadedness.    Past Medical History:   Diagnosis Date   • CVA (cerebral vascular accident) ([facility])     left sided weakness   • DM II (diabetes mellitus, type II), controlled ([facility])    • TIA (transient ischemic attack)      Past Surgical History:   Procedure Laterality Date   • TONSILLECTOMY       History reviewed. No pertinent family history.  Social History     Social History   • Marital status:      Spouse name: N/A   • Number of children: N/A   • Years of education: N/A     Occupational History   • Not on file.     Social History Main Topics   • Smoking status: Former Smoker     Packs/day: 2.00     Years: 5.00   • Smokeless tobacco: Never Used   • Alcohol use Yes      Comment: occ, none since hospital discharge (02/2018)   • Drug use: No   • Sexual activity: Yes     Partners: Female     Other Topics Concern   • Not on file     Social History Narrative   • No narrative on file     No Known Allergies  Outpatient Encounter Prescriptions as of 6/1/2018    "  Medication Sig Dispense Refill   • metformin (GLUCOPHAGE) 1000 MG tablet Take 1 Tab by mouth 2 times a day, with meals. 180 Tab 2   • metoprolol SR (TOPROL XL) 100 MG TABLET SR 24 HR Take 1 Tab by mouth every evening. 90 Tab 3   • triamcinolone acetonide (KENALOG) 0.1 % Cream Apply 1 Application to affected area(s) 2 times a day. 15 g 1   • apixaban (ELIQUIS) 5mg Tab Take 1 Tab by mouth 2 Times a Day. 60 Tab 5   • furosemide (LASIX) 20 MG Tab Take 1 Tab by mouth 1 time daily as needed. 30 Tab 11   • rosuvastatin (CRESTOR) 20 MG Tab Take 1 Tab by mouth every evening. 30 Tab 11   • lisinopril (PRINIVIL) 10 MG Tab Take 1 Tab by mouth every day. 30 Tab 11   • spironolactone (ALDACTONE) 25 MG Tab Take 1 Tab by mouth every day. 30 Tab 3     No facility-administered encounter medications on file as of 6/1/2018.      ROS     Objective:   BP (!) 92/58   Pulse 84   Ht 1.778 m (5' 10\")   Wt 91.6 kg (202 lb)   SpO2 98%   BMI 28.98 kg/m²      Physical Exam   Constitutional: He appears well-developed and well-nourished.   Neck: No JVD present.   Cardiovascular: Normal rate and regular rhythm.    No murmur heard.  Pulmonary/Chest: Effort normal and breath sounds normal. He has no rales.   Abdominal: Soft. There is no tenderness.   Musculoskeletal: He exhibits edema (Trace to 1+ left pretibial).       No results found for: CHOLSTRLTOT, LDL, HDL, TRIGLYCERIDE    Lab Results   Component Value Date/Time    SODIUM 135 03/13/2018 05:47 PM    POTASSIUM 4.4 03/13/2018 05:47 PM    CHLORIDE 102 03/13/2018 05:47 PM    CO2 25 03/13/2018 05:47 PM    GLUCOSE 173 (H) 03/13/2018 05:47 PM    BUN 21 03/13/2018 05:47 PM    CREATININE 0.75 03/13/2018 05:47 PM     Lab Results   Component Value Date/Time    ALKPHOSPHAT 64 02/15/2018 08:04 AM    ASTSGOT 20 02/15/2018 08:04 AM    ALTSGPT 44 02/15/2018 08:04 AM    TBILIRUBIN 0.6 02/15/2018 08:04 AM      Lab Results   Component Value Date/Time    BNPBTYPENAT 327 (H) 02/14/2018 12:45 PM    "         Transthoracic  Echo Report  CONCLUSIONS  Mildly reduced left ventricular systolic function.  Left ventricular ejection fraction is visually estimated to be 48%.  Aortic sclerosis without stenosis.  Unable to estimate pulmonary artery pressure due to an inadequate   tricuspid regurgitant jet.    JUNAID NAVARRETE  Exam Date:         06/01/2018           Assessment:     1. NYHA class 2 and KALYN/AHA stage C acute systolic congestive heart failure (HCC)     2. Chronic anticoagulation     3. History of pulmonary embolism         Medical Decision Making:  Today's Assessment / Status / Plan:     Mr. Navarrete is clinically stable.  He has had a marked improvement of his left ventricular systolic function which is now low normal to minimally reduced.  He is tolerating his medications well.  I feel his reduced functional capacity is probably due to poor physical conditioning.  I would like him to walk for at least 30 minutes 3 times a week and be mildly dyspneic.  He will follow-up in about 6 months with repeat lab work.      Andre Falk M.D.  [address]  [address]  [facility] [address]  VIA In Basket

## 2018-06-03 LAB
CARDIOLIPIN IGA SER IA-ACNC: 1 APL (ref 0–11)
CARDIOLIPIN IGG SER IA-ACNC: 2 GPL (ref 0–14)
CARDIOLIPIN IGM SER IA-ACNC: 2 MPL (ref 0–12)
PROT C ACT/NOR PPP: 161 % (ref 83–168)
PROT S ACT/NOR PPP: 96 % (ref 66–143)

## 2018-06-04 LAB — F5 P.R506Q BLD/T QL: NEGATIVE

## 2018-06-05 ENCOUNTER — HOSPITAL ENCOUNTER (OUTPATIENT)
Facility: MEDICAL CENTER | Age: 58
End: 2018-06-05
Attending: INTERNAL MEDICINE | Admitting: INTERNAL MEDICINE
Payer: MEDICARE

## 2018-07-02 DIAGNOSIS — I10 ESSENTIAL HYPERTENSION: Primary | ICD-10-CM

## 2018-07-02 RX ORDER — SPIRONOLACTONE 25 MG/1
25 TABLET ORAL DAILY
Qty: 30 TAB | Refills: 11 | Status: SHIPPED | OUTPATIENT
Start: 2018-07-02 | End: 2024-01-25

## 2018-07-11 ENCOUNTER — PATIENT MESSAGE (OUTPATIENT)
Dept: HEALTH INFORMATION MANAGEMENT | Facility: OTHER | Age: 58
End: 2018-07-11

## 2018-07-20 ENCOUNTER — PATIENT OUTREACH (OUTPATIENT)
Dept: HEALTH INFORMATION MANAGEMENT | Facility: OTHER | Age: 58
End: 2018-07-20

## 2018-08-08 ENCOUNTER — TELEPHONE (OUTPATIENT)
Dept: MEDICAL GROUP | Facility: MEDICAL CENTER | Age: 58
End: 2018-08-08

## 2018-08-08 NOTE — TELEPHONE ENCOUNTER
Future Appointments       Provider Department Center    8/15/2018 10:40 AM [facility]D.; [facility]    11/14/2018 10:00 AM Michel Gonzalez M.D. [facility]         ANNUAL WELLNESS VISIT PRE-VISIT PLANNING WITHOUT OUTREACH    1.  Reviewed note from last office visit with PCP: YES    3.  Immunizations were updated in Epic using WebIZ?: No WebIZ record       •  WebIZ Recommendations:       •  Is patient due for Tdap? Yes       •  Is patient due for Shingles? Yes    4.  Patient is due for the following Health Maintenance Topics:   Health Maintenance Due   Topic Date Due   • Annual Wellness Visit  1960   • DIABETES MONOFILAMENT / LE EXAM  01/14/1962   • IMM HEP B VACCINE (1 of 3 - Risk 3-dose series) 07/14/1980   • IMM DTaP/Tdap/Td Vaccine (1 - Tdap) 07/14/1980   • IMM PNEUMOCOCCAL 19-64 (ADULT) MEDIUM RISK SERIES (1 of 1 - PPSV23) 07/14/1980   • COLONOSCOPY /Referral Pending  07/14/2011   • IMM ZOSTER VACCINES (1 of 2) 07/14/2011           5.  Reviewed/Updated the following with patient:       •   Preferred Pharmacy? YES       •   Preferred Lab? YES       •   Preferred Communication? YES       •   Allergies? YES       •   Medications? YES. Was Abstract Encounter opened and chart updated? YES       •   Social History? YES. Was Abstract Encounter opened and chart updated? YES       •   Family History (document living status of immediate family members and if + hx of  cancer, diabetes, hypertension, hyperlipidemia, heart attack, stroke) YES. Was Abstract Encounter opened and chart updated? YES    6.  Care Team Updated:       •   DME Company (gait device, O2, CPAP, etc.): YES and N\A       •   Other Specialists (eye doctor, derm, GYN, cardiology, endo, etc): N\A    7.  Patient has the following Care Path diagnoses on Problem List:  DIABETES    Has patient ever had diabetes education?      8.  MDX  printed and highlighted for Provider? NO    9.  Patient was advised: “This is a free wellness visit. The provider will screen for medical conditions to help you stay healthy. If you have other concerns to address you may be asked to discuss these at a separate visit or there may be an additional fee.”     10.  Patient was informed to arrive 15 min prior to their scheduled appointment and bring in their medication bottles.

## 2018-08-15 ENCOUNTER — OFFICE VISIT (OUTPATIENT)
Dept: MEDICAL GROUP | Facility: MEDICAL CENTER | Age: 58
End: 2018-08-15
Payer: MEDICARE

## 2018-08-15 VITALS
BODY MASS INDEX: 29.51 KG/M2 | TEMPERATURE: 98 F | SYSTOLIC BLOOD PRESSURE: 112 MMHG | DIASTOLIC BLOOD PRESSURE: 64 MMHG | WEIGHT: 206.13 LBS | HEART RATE: 74 BPM | RESPIRATION RATE: 16 BRPM | OXYGEN SATURATION: 97 % | HEIGHT: 70 IN

## 2018-08-15 DIAGNOSIS — Z86.73 H/O: CVA (CEREBROVASCULAR ACCIDENT): ICD-10-CM

## 2018-08-15 DIAGNOSIS — E11.69 ONYCHOMYCOSIS OF MULTIPLE TOENAILS WITH TYPE 2 DIABETES MELLITUS (HCC): ICD-10-CM

## 2018-08-15 DIAGNOSIS — I50.20 SYSTOLIC CHF WITH REDUCED LEFT VENTRICULAR FUNCTION, NYHA CLASS 3 (HCC): ICD-10-CM

## 2018-08-15 DIAGNOSIS — Z86.711 HISTORY OF PULMONARY EMBOLISM: ICD-10-CM

## 2018-08-15 DIAGNOSIS — Z00.00 MEDICARE ANNUAL WELLNESS VISIT, SUBSEQUENT: Primary | ICD-10-CM

## 2018-08-15 DIAGNOSIS — I44.0 FIRST DEGREE AV BLOCK: ICD-10-CM

## 2018-08-15 DIAGNOSIS — Z79.01 CHRONIC ANTICOAGULATION: ICD-10-CM

## 2018-08-15 DIAGNOSIS — E13.3299: ICD-10-CM

## 2018-08-15 DIAGNOSIS — B35.1 ONYCHOMYCOSIS OF MULTIPLE TOENAILS WITH TYPE 2 DIABETES MELLITUS (HCC): ICD-10-CM

## 2018-08-15 DIAGNOSIS — I82.4Y2 DVT, LOWER EXTREMITY, PROXIMAL, ACUTE, LEFT (HCC): ICD-10-CM

## 2018-08-15 DIAGNOSIS — L20.84 INTRINSIC ECZEMA: ICD-10-CM

## 2018-08-15 DIAGNOSIS — L57.0 ACTINIC KERATOSIS OF RIGHT CHEEK: ICD-10-CM

## 2018-08-15 PROBLEM — I50.21: Status: RESOLVED | Noted: 2018-06-01 | Resolved: 2018-08-15

## 2018-08-15 PROCEDURE — G0402 INITIAL PREVENTIVE EXAM: HCPCS | Performed by: FAMILY MEDICINE

## 2018-08-15 ASSESSMENT — ACTIVITIES OF DAILY LIVING (ADL): BATHING_REQUIRES_ASSISTANCE: 0

## 2018-08-15 ASSESSMENT — ENCOUNTER SYMPTOMS: GENERAL WELL-BEING: GOOD

## 2018-08-15 ASSESSMENT — PATIENT HEALTH QUESTIONNAIRE - PHQ9: CLINICAL INTERPRETATION OF PHQ2 SCORE: 0

## 2018-08-15 NOTE — PROGRESS NOTES
Chief Complaint   Patient presents with   • Annual Exam         HPI:  Ryan is a 58 y.o. here for Medicare Annual Wellness Visit    (HCC) DVT, lower extremity, proximal, acute, left  This problem is chronic, stable, and monitored appropriately by history/labs/imaging as needed, and is well-controlled on the current regimen.  Please continue current regimen    H/O CVA (cerebrovascular accident)  This problem is chronic, stable, and monitored appropriately by history/labs/imaging as needed, and is well-controlled on the current regimen.  Please continue current regimen    H/O pulmonary embolism  This problem is chronic, stable, and monitored appropriately by history/labs/imaging as needed, and is well-controlled on the current regimen.  Please continue current regimen    Chronic anticoagulation  This problem is chronic, stable, and monitored appropriately by history/labs/imaging as needed, and is well-controlled on the current regimen.  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    We discussed that prediabetes/diabetes mellitus type 2 are medical conditions of lifestyle habits (less than optimal dietary choices, insufficient cardiovascular exercise), and that they can be controlled (in part or in whole) by these lifestyle changes.     Furthermore, we discussed that at present time it is better to pursue lifestyle changes rather than changing medications. Patient is in agreement.     Please see assessment/plan as below for further  details.    ROS is NEGATIVE for blurred vision, polydipsia, polyuria, diaphoresis, palpitations, fatigue, irritability, flank pain, BLE paresthesias.    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  Please continue current regimen    ([facility]) Onychomycosis of multiple toenails with type 2 diabetes mellitus  New problem for medical evaluation, uncontrolled, patient declines pharmacotherapy at this time.    First degree AV block  This problem is chronic, stable, and monitored appropriately by history/labs/imaging as needed, and is well-controlled on the current regimen.  Please continue current regimen     Intrinsic eczema  This problem is chronic, stable, and monitored appropriately by history/labs/imaging as needed, and is well-controlled on the current regimen.  Please continue current regimen      Patient Active Problem List    Diagnosis Date Noted   • H/O pulmonary embolism 02/14/2018     Priority: High   • ([facility]) DVT, lower extremity, proximal, acute, left 02/15/2018     Priority: Medium   • ([facility]) Onychomycosis of multiple toenails with type 2 diabetes mellitus 08/15/2018   • Actinic keratosis of right cheek 08/15/2018   • ([facility]) Nonproliferative retinopathy due to secondary diabetes mellitus 05/06/2018   • Intrinsic eczema 05/02/2018   • H/O CVA (cerebrovascular accident) 03/21/2018   • Chronic anticoagulation 03/21/2018   • First degree AV block 03/21/2018   • ([facility]) 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 30 Tab 11   • metformin (GLUCOPHAGE) 1000 MG tablet Take 1 Tab by mouth 2 times a day, with meals. 180 Tab 2   • metoprolol SR (TOPROL XL) 100 MG TABLET SR 24 HR Take 1 Tab by mouth every evening. 90 Tab 3   • furosemide (LASIX) 20 MG Tab Take 1 Tab by mouth 1 time daily as needed. 30 Tab 11   • rosuvastatin (CRESTOR) 20 MG Tab Take 1 Tab by mouth every evening. 30 Tab 11   • lisinopril (PRINIVIL) 10 MG Tab Take 1 Tab by mouth every day. 30 Tab 11   • apixaban (ELIQUIS) 5mg Tab Take 1 Tab by mouth 2 Times a Day. 60 Tab 5     No current facility-administered medications for this visit.         Patient is taking medications as noted in medication list.  Current supplements as per medication list.     Allergies: Patient has no known allergies.    Current social contact/activities: Patient is currently employed, [employer].      Is patient current with immunizations? No, due for HEPATITIS B, PNEUMOVAX (PPSV23), TDAP and SHINGRIX (Shingles). Patient is interested in receiving NONE today.    He  reports that he has quit smoking. He has a 10.00 pack-year smoking history. He has never used smokeless tobacco. He reports that he drinks alcohol. He reports that he does not use drugs.  Counseling given: Not Answered        DPA/Advanced directive: Patient does not have an Advanced Directive.  A packet and workshop information was given on Advanced Directives.    ROS:    Gait: Uses no assistive device   Ostomy: No   Other tubes: No   Amputations: No   Chronic oxygen use No   Last eye exam 07/2018   Wears hearing aids: No   : Denies any urinary leakage during the last 6 months      Screening:    DIABETES    Has patient ever had diabetes education? Yes, and is NOT interested in more at this time.        Depression Screening    Little interest or pleasure in doing things?  0 - not  at all  Feeling down, depressed, or hopeless? 0 - not at all  Patient Health Questionnaire Score: 0    If depressive symptoms identified deferred to follow up visit unless specifically addressed in assessment and plan.    Interpretation of PHQ-9 Total Score   Score Severity   1-4 No Depression   5-9 Mild Depression   10-14 Moderate Depression   15-19 Moderately Severe Depression   20-27 Severe Depression    Screening for Cognitive Impairment    Three Minute Recall (leader, season, table)  3/3 (leader, season, table)  Octaviano clock face with all 12 numbers and set the hands to show 10 past 11.  Yes 5/5  If cognitive concerns identified, deferred for follow up unless specifically addressed in assessment and plan.    Fall Risk Assessment    Has the patient had two or more falls in the last year or any fall with injury in the last year?  No  If fall risk identified, deferred for follow up unless specifically addressed in assessment and plan.    Safety Assessment    Throw rugs on floor.  Yes  Handrails on all stairs.  Yes  Good lighting in all hallways.  Yes  Difficulty hearing.  No  Patient counseled about all safety risks that were identified.    Functional Assessment ADLs    Are there any barriers preventing you from cooking for yourself or meeting nutritional needs?  No.    Are there any barriers preventing you from driving safely or obtaining transportation?  No.    Are there any barriers preventing you from using a telephone or calling for help?  No.    Are there any barriers preventing you from shopping?  No.    Are there any barriers preventing you from taking care of your own finances?  No.    Are there any barriers preventing you from managing your medications?  No.    Are there any barriers preventing you from showering, bathing or dressing yourself?  No.    Are you currently engaging in any exercise or physical activity?  No.  Not currently   What is your perception of your health?  Good.    Health Maintenance  "Summary                Annual Wellness Visit Overdue 1960     DIABETES MONOFILAMENT / LE EXAM Overdue 1/14/1962     IMM HEP B VACCINE Overdue 7/14/1980     IMM DTaP/Tdap/Td Vaccine Overdue 7/14/1980     IMM PNEUMOCOCCAL 19-64 (ADULT) MEDIUM RISK SERIES Overdue 7/14/1980     COLONOSCOPY Overdue 7/14/2011     IMM ZOSTER VACCINES Overdue 7/14/2011     IMM INFLUENZA Next Due 9/1/2018     A1C SCREENING Next Due 12/1/2018      Done 6/1/2018 HEMOGLOBIN A1C      Patient has more history with this topic...    SERUM CREATININE Next Due 3/13/2019      Done 3/13/2018 BASIC METABOLIC PANEL      Patient has more history with this topic...    URINE ACR / MICROALBUMIN Next Due 3/22/2019      Done 3/22/2018 MICROALBUMIN CREAT RATIO URINE    FASTING LIPID PROFILE Next Due 6/1/2019      Done 6/1/2018 LIPID PROFILE     RETINAL SCREENING Next Due 6/21/2019      Done 6/21/2018 REFERRAL FOR RETINAL SCREENING EXAM     Patient has more history with this topic...          Patient Care Team:  Andre Falk M.D. as PCP - General (Family Medicine)  Michel Gonzalez M.D. as Consulting Physician (Cardiology)    Social History   Substance Use Topics   • Smoking status: Former Smoker     Packs/day: 2.00     Years: 5.00   • Smokeless tobacco: Never Used   • Alcohol use Yes      Comment: occ, none since hospital discharge (02/2018)     History reviewed. No pertinent family history.  He  has a past medical history of CVA (cerebral vascular accident) (HCC); DM II (diabetes mellitus, type II), controlled (HCC); and TIA (transient ischemic attack).   Past Surgical History:   Procedure Laterality Date   • TONSILLECTOMY             Exam:     Blood pressure 112/64, pulse 74, temperature 36.7 °C (98 °F), resp. rate 16, height 1.778 m (5' 10\"), weight 93.5 kg (206 lb 2.1 oz), SpO2 97 %. Body mass index is 29.58 kg/m².    Hearing good.    Dentition fair  Alert, oriented in no acute distress.  Eye contact is good, speech goal directed, affect " calm  ONychomycosis of multiple toenials.      - 06/01/18 -- Echo -- Mildly reduced LVEF 48%, Aortic sclerosis    - 06/01/18 -- Dr. Gonzalez --> Improvement of LV fxn s/p former PE --> Increase exercise over time, f/u in 6months    - 05/02/18 -- Diabetic Retinal Exam NEGATIVE    - 06/01/18 --type 2 diabetes of (7.7%), homocystinemia at 17.32, decreased platelet function (55.0), decreased HDL at 39    Assessment and Plan. The following treatment and monitoring plan is recommended:    1. Medicare annual wellness visit, subsequent  [place] ANNUAL WELLNESS VISIT-INCLUDES PPPS SUBSEQUE*   2. 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 ([facility]) Onychomycosis of multiple toenails with type 2 diabetes mellitus  [place] ANNUAL WELLNESS VISIT-INCLUDES PPPS SUBSEQUE*   5. ([facility]) Systolic CHF with reduced left ventricular function, NYHA class 3  [place] ANNUAL WELLNESS VISIT-INCLUDES PPPS SUBSEQUE*   6. First degree AV block  [place] ANNUAL WELLNESS VISIT-INCLUDES PPPS SUBSEQUE*   7. ([facility]) DVT, lower extremity, proximal, acute, left  [place] ANNUAL WELLNESS VISIT-INCLUDES PPPS SUBSEQUE*   8. Actinic keratosis of right cheek  [place] ANNUAL WELLNESS VISIT-INCLUDES PPPS SUBSEQUE*   9. H/O CVA (cerebrovascular accident)  [place] ANNUAL WELLNESS VISIT-INCLUDES PPPS SUBSEQUE*   10. H/O pulmonary embolism  [place] ANNUAL WELLNESS VISIT-INCLUDES PPPS SUBSEQUE*   11. Chronic anticoagulation  [place] ANNUAL WELLNESS VISIT-INCLUDES PPPS SUBSEQUE*   12. Intrinsic eczema  [place] ANNUAL WELLNESS VISIT-INCLUDES PPPS SUBSEQUE*         Services suggested: No services needed at this time  Health Care Screening recommendations as per orders if  indicated.  Referrals offered: PT/OT/Nutrition counseling/Behavioral Health/Smoking cessation as per orders if indicated.    Discussion today about general wellness and lifestyle habits:    · Prevent falls and reduce trip hazards; Cautioned about securing or removing rugs.  · Have a working fire alarm and carbon monoxide detector;   · Engage in regular physical activity and social activities       Follow-up: Return in about 1 month (around 9/15/2018) for Management of R Cheek Actinic Keratosis.

## 2018-08-19 NOTE — ASSESSMENT & PLAN NOTE
We discussed that patient is diagnosed with diabetes, uncontrolled, given that the A1c is:   Lab Results   Component Value Date/Time    HBA1C 7.7 (H) 06/01/2018 09:37 AM        Patient is currently taking (metformin) for glycemic control, and (lisinopril, rosuvastatin, metoprolol) for health maintenance related to diabetes mellitus.    We discussed that prediabetes/diabetes mellitus type 2 are medical conditions of lifestyle habits (less than optimal dietary choices, insufficient cardiovascular exercise), and that they can be controlled (in part or in whole) by these lifestyle changes.     Furthermore, we discussed that at present time it is better to pursue lifestyle changes rather than changing medications. Patient is in agreement.     Please see assessment/plan as below for further details.    ROS is NEGATIVE for blurred vision, polydipsia, polyuria, diaphoresis, palpitations, fatigue, irritability, flank pain, BLE paresthesias.

## 2018-08-29 NOTE — PROGRESS NOTES
Outcome: Left Message    Please transfer to Patient Outreach Team at [phone] when patient returns call.      Attempt # 2

## 2018-09-11 NOTE — PROGRESS NOTES
Outcome: Left Message    Please transfer to Patient Outreach Team at [phone] when patient returns call.    Attempt # 3

## 2018-09-17 ENCOUNTER — OFFICE VISIT (OUTPATIENT)
Dept: MEDICAL GROUP | Facility: MEDICAL CENTER | Age: 58
End: 2018-09-17
Payer: MEDICARE

## 2018-09-17 VITALS
TEMPERATURE: 98.7 F | HEIGHT: 70 IN | WEIGHT: 209.22 LBS | DIASTOLIC BLOOD PRESSURE: 68 MMHG | HEART RATE: 68 BPM | SYSTOLIC BLOOD PRESSURE: 108 MMHG | BODY MASS INDEX: 29.95 KG/M2 | OXYGEN SATURATION: 96 %

## 2018-09-17 DIAGNOSIS — E11.69 ONYCHOMYCOSIS OF MULTIPLE TOENAILS WITH TYPE 2 DIABETES MELLITUS (HCC): ICD-10-CM

## 2018-09-17 DIAGNOSIS — L57.0 ACTINIC KERATOSIS OF RIGHT CHEEK: ICD-10-CM

## 2018-09-17 DIAGNOSIS — Z86.73 H/O: CVA (CEREBROVASCULAR ACCIDENT): ICD-10-CM

## 2018-09-17 DIAGNOSIS — Z12.11 COLON CANCER SCREENING: ICD-10-CM

## 2018-09-17 DIAGNOSIS — B35.1 ONYCHOMYCOSIS OF MULTIPLE TOENAILS WITH TYPE 2 DIABETES MELLITUS (HCC): ICD-10-CM

## 2018-09-17 DIAGNOSIS — E66.9 OBESITY (BMI 30-39.9): ICD-10-CM

## 2018-09-17 PROCEDURE — 99214 OFFICE O/P EST MOD 30 MIN: CPT | Performed by: FAMILY MEDICINE

## 2018-09-19 NOTE — PROGRESS NOTES
Outcome: Left Message    Please transfer to Patient Outreach Team at [phone] when patient returns call.    Attempt # 4

## 2018-09-21 NOTE — ASSESSMENT & PLAN NOTE
We discussed that patient is diagnosed with diabetes, uncontrolled, given that the A1c is:   Lab Results   Component Value Date/Time    HBA1C 7.7 (H) 06/01/2018 09:37 AM        Patient is currently taking (Metformin) for glycemic control, and (Lisinopril, Rosuvastatin) for health maintenance related to diabetes mellitus.    We discussed that prediabetes/diabetes mellitus type 2 are medical conditions of lifestyle habits (less than optimal dietary choices, insufficient cardiovascular exercise), and that they can be controlled (in part or in whole) by these lifestyle changes.     Furthermore, we discussed that at present time it is better to pursue lifestyle changes rather than changing medications. Patient is in agreement.     Please see assessment/plan as below for further details.    ROS is NEGATIVE for blurred vision, polydipsia, polyuria, diaphoresis, palpitations, fatigue, irritability, flank pain, BLE paresthesias.

## 2018-09-21 NOTE — ASSESSMENT & PLAN NOTE
Chronic, uncontrolled, mildly improving.  Patient states that he is using topical home remedy to treat this condition, and seems to be working.  Patient has also been suggested to use Epsom salts soaks, nail filing, and apple cider vinegar.  Patient has also been given the option of oral terbinafine, however declines a due to possible hepatotoxicity.    Toenails are not ingrown, no paronychia, no bloody or purulent drainage, and no tenderness to toes.

## 2018-09-21 NOTE — PROGRESS NOTES
Subjective:   Chief Complaint/History of Present Illness:  Ryan Small is a 58 y.o. 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    (HCC) Onychomycosis of multiple toenails with type 2 diabetes mellitus  Chronic, uncontrolled, mildly improving.  Patient states that he is using topical home remedy to treat this condition, and seems to be working.  Patient has also been suggested to use Epsom salts soaks, nail filing, and apple cider vinegar.  Patient has also been given the option of oral terbinafine, however declines a due to possible hepatotoxicity.    Toenails are not ingrown, no paronychia, no bloody or purulent drainage, and no tenderness to toes.    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    We discussed that prediabetes/diabetes mellitus type 2 are medical conditions of lifestyle habits (less than optimal dietary choices, insufficient cardiovascular exercise), and that they can be controlled (in part or in whole) by these lifestyle changes.     Furthermore, we discussed that at present time it is better to pursue  lifestyle changes rather than changing medications. Patient is in agreement.     Please see assessment/plan as below for further details.    ROS is NEGATIVE for blurred vision, polydipsia, polyuria, diaphoresis, palpitations, fatigue, irritability, flank pain, BLE paresthesias.    Obesity (BMI 30-39.9)  Chronic, uncontrolled, patient gaining weight over time.    H/O CVA (cerebrovascular accident)  Chronic, stable, well-controlled.  Patient with residual weakness/hemiplegia of right side.    Actinic keratosis of right cheek  Chronic, stable, will control appears to be resolving.      Patient Active Problem List    Diagnosis Date Noted   • H/O pulmonary embolism 02/14/2018     Priority: High   • (HCC) DVT, lower extremity, proximal, acute, left 02/15/2018     Priority: Medium   • Obesity (BMI 30-39.9) 09/17/2018   • (HCC) Onychomycosis of multiple toenails with type 2 diabetes mellitus 08/15/2018   • Actinic keratosis of right cheek 08/15/2018   • (HCC) Nonproliferative retinopathy due to secondary diabetes mellitus 05/06/2018   • Intrinsic eczema 05/02/2018   • H/O CVA (cerebrovascular accident) 03/21/2018   • Chronic anticoagulation 03/21/2018   • First degree AV block 03/21/2018   • (HCC) 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     Current Medications:     Current Outpatient Prescriptions   Medication Sig Dispense Refill   • spironolactone (ALDACTONE) 25 MG Tab Take 1 Tab by mouth every day. 30 Tab 11   • metformin (GLUCOPHAGE) 1000 MG tablet Take 1 Tab by mouth 2 times a day, with meals. 180 Tab 2   • metoprolol SR (TOPROL XL) 100 MG TABLET SR 24 HR Take 1 Tab by mouth every evening. 90 Tab 3   • apixaban (ELIQUIS) 5mg Tab  "Take 1 Tab by mouth 2 Times a Day. 60 Tab 5   • furosemide (LASIX) 20 MG Tab Take 1 Tab by mouth 1 time daily as needed. 30 Tab 11   • rosuvastatin (CRESTOR) 20 MG Tab Take 1 Tab by mouth every evening. 30 Tab 11   • lisinopril (PRINIVIL) 10 MG Tab Take 1 Tab by mouth every day. 30 Tab 11     No current facility-administered medications for this visit.         Social History:     Social History   Substance Use Topics   • Smoking status: Former Smoker     Packs/day: 2.00     Years: 5.00   • Smokeless tobacco: Never Used   • Alcohol use Yes      Comment: occ, none since hospital discharge (02/2018)       ROS:     - NOTE: All other systems reviewed and are negative, except as in HPI.     Objective:   Physical Exam:    Vitals: Blood pressure 108/68, pulse 68, temperature 37.1 °C (98.7 °F), height 1.778 m (5' 10\"), weight 94.9 kg (209 lb 3.5 oz), SpO2 96 %.   BMI: Body mass index is 30.02 kg/m².   General/Constitutional: Vitals as above, Well nourished, well developed male in no acute distress   Head/Eyes: Head is grossly normal & atraumatic, bilateral conjunctivae clear and not injected, bilateral EOMI, bilateral PERRL   ENT: Bilateral external ears grossly normal in appearance, Hearing grossly intact, External nares normal in appearance and without discharge/bleeding   Respiratory: No respiratory distress, bilateral lungs are clear to ausculation in all lung fields (anterior/lateral/posterior), no wheezing/rhonchi/rales   Cardiovascular: Regular rate and rhythm without murmur/gallops/rubs, distal pulses are intact and equal bilaterally (radial, posterior tibial), no bilateral lower extremity edema   MSK: Gait grossly normal & not antalgic   Integumentary: Patient with thickened toenails of bilateral great digits, as well as of multiple other toenails, no apparent rashes   Psych: Judgment grossly appropriate, no apparent depression/anxiety    Health Maintenance:     -Fit ordered as below    Imaging/Labs:     - No new " labs to review    Assessment and Plan:   1. (HCC) Onychomycosis of multiple toenails with type 2 diabetes mellitus  Chronic, uncontrolled, patient advised to continue using his topical therapies, however that this may need oral terbinafine in the future.    2. 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  Patient and I discussed the importance of lifestyle changes, with particular emphasis on plant-based nutrition (for the purposes of weight loss, general health, DM2), as well as cardiovascular exercise, proper sleep, and stress management.  This discussion is briefly summarized in the patient instruction section below.  Patient verbalized understanding.   - HEMOGLOBIN A1C; Future    3. Obesity (BMI 30-39.9)  Chronic, uncontrolled, patient advised to pursue lifestyle changes.   - Patient identified as having weight management issue.  Appropriate orders and counseling given.    4. H/O CVA (cerebrovascular accident)  Chronic, stable, well-controlled.  Continue taking medication as directed.     5. Actinic keratosis of right cheek  Chronic, stable, well-controlled, healing    6. Colon cancer screening  Unknown control, FIT ordered as below   - OCCULT BLOOD FECES IMMUNOASSAY (FIT); Future        RTC: in 12/2018 for Medicare Annual Wellness visit.    PLEASE NOTE: This dictation was created using voice recognition software. I have made every reasonable attempt to correct obvious errors, but I expect that there are errors of grammar and possibly content that I did not discover before finalizing the note.

## 2018-09-25 NOTE — PROGRESS NOTES
Outcome: Left Message    Please transfer to Patient Outreach Team at [phone] when patient returns call.    Attempt # Final

## 2018-11-15 ENCOUNTER — TELEPHONE (OUTPATIENT)
Dept: VASCULAR LAB | Facility: MEDICAL CENTER | Age: 58
End: 2018-11-15

## 2018-11-15 NOTE — TELEPHONE ENCOUNTER
[facility]    Pt confirms he has been off OAC for 2 months; no longer able to afford medication.  He is applying for insurance during open enrolment to provide OAC next year.  Pt unable to be seen by clinic until 11/30/18 or come to [place] for samples.  Pt to take 81 mg ASA while not on OAC, then discontinue ASA when OAC is started again.    Tae Chavez, PharmD

## 2018-11-30 ENCOUNTER — ANTICOAGULATION VISIT (OUTPATIENT)
Dept: MEDICAL GROUP | Facility: PHYSICIAN GROUP | Age: 58
End: 2018-11-30
Payer: MEDICARE

## 2018-11-30 DIAGNOSIS — I44.0 FIRST DEGREE AV BLOCK: ICD-10-CM

## 2018-11-30 DIAGNOSIS — Z86.711 HISTORY OF PULMONARY EMBOLISM: ICD-10-CM

## 2018-11-30 DIAGNOSIS — I82.4Y2 DVT, LOWER EXTREMITY, PROXIMAL, ACUTE, LEFT (HCC): ICD-10-CM

## 2018-11-30 PROCEDURE — 99211 OFF/OP EST MAY X REQ PHY/QHP: CPT | Performed by: NURSE PRACTITIONER

## 2018-11-30 NOTE — PROGRESS NOTES
Anticoagulation Summary  As of 11/30/2018    INR goal:      TTR:   --   Today's INR:      Warfarin maintenance plan:   No maintenance plan   Plan last modified:   David Lilly, PharmD (11/30/2018)   Next INR check:   12/12/2018   Target end date:   Indefinite    Indications    (HCC) DVT  lower extremity  proximal  acute  left [I82.4Y2]  First degree AV block [I44.0]  H/O pulmonary embolism [Z86.711]             Anticoagulation Episode Summary     INR check location:       Preferred lab:       Send INR reminders to:       Comments:         Anticoagulation Care Providers     Provider Role Specialty Phone number    Andre Falk M.D. Referring Family Medicine [phone]    [facility] Anticoagulation Services Responsible  [phone]        Anticoagulation Patient Findings      HPI:  Ryan Small is being seen today in clinic because he is currently off all of his anticoagulants since April.  He cannot afford it and is looking for options today.  We talked about switching him to warfarin however because he lives in [place] he is concerned about the testing.  We said we can give him a standing order and he can get an INR via the lab and we could manage him over the phone.  He is working on getting better insurance so that the first of the year he can afford Eliquis again.  I have enough samples of Pradaxa 150 mg twice daily to get him through the next month so he can start Eliquis again the first of the year.  Unfortunately we do not have samples of Eliquis in this office and he would prefer not to drive to [place] to get them at this point.      Assessment:  No INR today because he is not on an anticoagulant.  I would prefer him to be on something rather than nothing to prevent DVTs and pulmonary emboli.  Because of this I will start him on Pradaxa 150 mg twice daily.  After the first of the year we will switch him to Eliquis 5 mg twice daily.  He has an appointment with a cardiologist in [place] in 2  weeks so he will also have an appointment with us.  If he is having any issues with the Pradaxa we can switch him to Eliquis or Xarelto as we have more samples at her main clinic.  His most recent labs are within normal limits.  No drug interactions with Pradaxa noted.    Plan   Start Pradaxa 150 mg twice daily    Follow up:  Follow up appointment in 2 weeks when he is in [place] for his cardiology appointment      David Lilly, JamesD

## 2018-12-05 ENCOUNTER — HOSPITAL ENCOUNTER (OUTPATIENT)
Facility: MEDICAL CENTER | Age: 58
End: 2018-12-05
Attending: FAMILY MEDICINE
Payer: MEDICARE

## 2018-12-05 PROCEDURE — 82274 ASSAY TEST FOR BLOOD FECAL: CPT

## 2018-12-06 ENCOUNTER — HOSPITAL ENCOUNTER (OUTPATIENT)
Dept: LAB | Facility: MEDICAL CENTER | Age: 58
End: 2018-12-06
Attending: INTERNAL MEDICINE
Payer: MEDICARE

## 2018-12-06 ENCOUNTER — HOSPITAL ENCOUNTER (OUTPATIENT)
Dept: LAB | Facility: MEDICAL CENTER | Age: 58
End: 2018-12-06
Attending: FAMILY MEDICINE
Payer: MEDICARE

## 2018-12-06 DIAGNOSIS — E78.5 DYSLIPIDEMIA: ICD-10-CM

## 2018-12-06 DIAGNOSIS — I50.21 NYHA CLASS 2 AND ACA/AHA STAGE C ACUTE SYSTOLIC CONGESTIVE HEART FAILURE: ICD-10-CM

## 2018-12-06 LAB
ALBUMIN SERPL BCP-MCNC: 4.2 G/DL (ref 3.2–4.9)
ALBUMIN/GLOB SERPL: 1.4 G/DL
ALP SERPL-CCNC: 49 U/L (ref 30–99)
ALT SERPL-CCNC: 21 U/L (ref 2–50)
ANION GAP SERPL CALC-SCNC: 8 MMOL/L (ref 0–11.9)
AST SERPL-CCNC: 15 U/L (ref 12–45)
BILIRUB SERPL-MCNC: 0.5 MG/DL (ref 0.1–1.5)
BUN SERPL-MCNC: 23 MG/DL (ref 8–22)
CALCIUM SERPL-MCNC: 9.5 MG/DL (ref 8.5–10.5)
CHLORIDE SERPL-SCNC: 103 MMOL/L (ref 96–112)
CHOLEST SERPL-MCNC: 118 MG/DL (ref 100–199)
CO2 SERPL-SCNC: 26 MMOL/L (ref 20–33)
CREAT SERPL-MCNC: 0.82 MG/DL (ref 0.5–1.4)
FASTING STATUS PATIENT QL REPORTED: NORMAL
GLOBULIN SER CALC-MCNC: 2.9 G/DL (ref 1.9–3.5)
GLUCOSE SERPL-MCNC: 150 MG/DL (ref 65–99)
HDLC SERPL-MCNC: 43 MG/DL
LDLC SERPL CALC-MCNC: 54 MG/DL
POTASSIUM SERPL-SCNC: 4.8 MMOL/L (ref 3.6–5.5)
PROT SERPL-MCNC: 7.1 G/DL (ref 6–8.2)
SODIUM SERPL-SCNC: 137 MMOL/L (ref 135–145)
TRIGL SERPL-MCNC: 107 MG/DL (ref 0–149)

## 2018-12-06 PROCEDURE — 80053 COMPREHEN METABOLIC PANEL: CPT

## 2018-12-06 PROCEDURE — 36415 COLL VENOUS BLD VENIPUNCTURE: CPT

## 2018-12-06 PROCEDURE — 80061 LIPID PANEL: CPT

## 2018-12-06 PROCEDURE — 83036 HEMOGLOBIN GLYCOSYLATED A1C: CPT | Mod: GA

## 2018-12-07 DIAGNOSIS — Z12.11 COLON CANCER SCREENING: ICD-10-CM

## 2018-12-07 LAB — HEMOCCULT STL QL IA: POSITIVE

## 2018-12-11 LAB
EST. AVERAGE GLUCOSE BLD GHB EST-MCNC: 217 MG/DL
HBA1C MFR BLD: 9.2 % (ref 0–5.6)

## 2018-12-12 ENCOUNTER — TELEPHONE (OUTPATIENT)
Dept: CARDIOLOGY | Facility: MEDICAL CENTER | Age: 58
End: 2018-12-12

## 2018-12-12 ENCOUNTER — OFFICE VISIT (OUTPATIENT)
Dept: CARDIOLOGY | Facility: MEDICAL CENTER | Age: 58
End: 2018-12-12
Payer: MEDICARE

## 2018-12-12 ENCOUNTER — ANTICOAGULATION VISIT (OUTPATIENT)
Dept: VASCULAR LAB | Facility: MEDICAL CENTER | Age: 58
End: 2018-12-12
Attending: INTERNAL MEDICINE
Payer: MEDICARE

## 2018-12-12 VITALS — HEART RATE: 79 BPM | DIASTOLIC BLOOD PRESSURE: 51 MMHG | SYSTOLIC BLOOD PRESSURE: 125 MMHG

## 2018-12-12 VITALS
WEIGHT: 214 LBS | OXYGEN SATURATION: 98 % | SYSTOLIC BLOOD PRESSURE: 120 MMHG | BODY MASS INDEX: 30.64 KG/M2 | HEART RATE: 80 BPM | DIASTOLIC BLOOD PRESSURE: 68 MMHG | HEIGHT: 70 IN

## 2018-12-12 DIAGNOSIS — I50.20 ACC/AHA STAGE C SYSTOLIC HEART FAILURE (HCC): ICD-10-CM

## 2018-12-12 DIAGNOSIS — I50.9 CONGESTIVE HEART FAILURE, NYHA CLASS 2 AND ACC/AHA STAGE C (HCC): ICD-10-CM

## 2018-12-12 DIAGNOSIS — Z86.711 HISTORY OF PULMONARY EMBOLISM: ICD-10-CM

## 2018-12-12 DIAGNOSIS — I44.0 FIRST DEGREE AV BLOCK: ICD-10-CM

## 2018-12-12 DIAGNOSIS — Z79.01 CHRONIC ANTICOAGULATION: ICD-10-CM

## 2018-12-12 DIAGNOSIS — I82.4Y2 DVT, LOWER EXTREMITY, PROXIMAL, ACUTE, LEFT (HCC): ICD-10-CM

## 2018-12-12 PROBLEM — I50.21: Status: RESOLVED | Noted: 2018-06-01 | Resolved: 2018-12-12

## 2018-12-12 PROCEDURE — 99212 OFFICE O/P EST SF 10 MIN: CPT

## 2018-12-12 PROCEDURE — 99214 OFFICE O/P EST MOD 30 MIN: CPT | Performed by: INTERNAL MEDICINE

## 2018-12-12 RX ORDER — DABIGATRAN ETEXILATE 150 MG/1
150 CAPSULE ORAL 2 TIMES DAILY
COMMUNITY
End: 2019-08-14

## 2018-12-12 ASSESSMENT — MINNESOTA LIVING WITH HEART FAILURE QUESTIONNAIRE (MLHF)
DIFFICULTY WITH SEXUAL ACTIVITIES: 5
DIFFICULTY TO CONCENTRATE OR REMEMBERING THINGS: 1
GIVING YOU SIDE EFFECTS FROM TREATMENTS: 0
MAKING YOU WORRY: 0
WORKING AROUND THE HOUSE OR YARD DIFFICULT: 0
FEELING LIKE A BURDEN TO FAMILY AND FRIENDS: 2
WALKING ABOUT OR CLIMBING STAIRS DIFFICULT: 0
MAKING YOU STAY IN A HOSPITAL: 5
DIFFICULTY SOCIALIZING WITH FAMILY OR FRIENDS: 0
TOTAL_SCORE: 13
DIFFICULTY SLEEPING WELL AT NIGHT: 0
DIFFICULTY WORKING TO EARN A LIVING: 0
MAKING YOU FEEL DEPRESSED: 0
TIRED, FATIGUED OR LOW ON ENERGY: 0
DIFFICULTY WITH RECREATIONAL PASTIMES, SPORTS, HOBBIES: 0
SWELLING IN ANKLES OR LEGS: 0
EATING LESS FOODS YOU LIKE: 0
MAKING YOU SHORT OF BREATH: 0
LOSS OF SELF CONTROL IN YOUR LIFE: 0
COSTING YOU MONEY FOR MEDICAL CARE: 0
DIFFICULTY GOING AWAY FROM HOME: 0
HAVING TO SIT OR LIE DOWN DURING THE DAY: 0

## 2018-12-12 ASSESSMENT — 6 MINUTE WALK TEST (6MWT): TOTAL DISTANCE WALKED (METERS): 366

## 2018-12-12 NOTE — LETTER
[facility]   [address]  [place] [address]  Phone: [phone]  Fax: [phone]              Ryan Small  1960    Encounter Date: 12/12/2018    Michel Gonzalez M.D.          PROGRESS NOTE:  Chief Complaint   Patient presents with   • CHF (Diastolic)     F/V: 6 MO/ LABS IN EPIC       Subjective:   Ryan Small is a 58 y.o. male who presents today for follow-up post admission for massive bilateral pulmonary emboli and recurrent cardiomyopathy with an ejection fraction of approximately 20% in 2/2018.  His ejection fraction improved to approximately 50%.    He denies any chest discomfort, dyspnea on exertion, PND or orthopnea.  He does occasionally note some dependent edema..  He has had no palpitations or lightheadedness.    Past Medical History:   Diagnosis Date   • CVA (cerebral vascular accident) ([facility])     left sided weakness   • DM II (diabetes mellitus, type II), controlled ([facility])    • TIA (transient ischemic attack)      Past Surgical History:   Procedure Laterality Date   • TONSILLECTOMY       History reviewed. No pertinent family history.  Social History     Social History   • Marital status:      Spouse name: N/A   • Number of children: N/A   • Years of education: N/A     Occupational History   • Not on file.     Social History Main Topics   • Smoking status: Former Smoker     Packs/day: 2.00     Years: 5.00   • Smokeless tobacco: Never Used   • Alcohol use Yes      Comment: occ, none since hospital discharge (02/2018)   • Drug use: No   • Sexual activity: Yes     Partners: Female     Other Topics Concern   • Not on file     Social History Narrative   • No narrative on file     No Known Allergies  Outpatient Encounter Prescriptions as of 12/12/2018   Medication Sig Dispense Refill   • dabigatran (PRADAXA) 150 MG Cap capsule Take 150 mg by mouth 2 Times a Day.     • spironolactone (ALDACTONE) 25 MG Tab Take 1 Tab by  "mouth every day. 30 Tab 11   • metformin (GLUCOPHAGE) 1000 MG tablet Take 1 Tab by mouth 2 times a day, with meals. 180 Tab 2   • metoprolol SR (TOPROL XL) 100 MG TABLET SR 24 HR Take 1 Tab by mouth every evening. 90 Tab 3   • furosemide (LASIX) 20 MG Tab Take 1 Tab by mouth 1 time daily as needed. (Patient taking differently: Take 20 mg by mouth every day.) 30 Tab 11   • rosuvastatin (CRESTOR) 20 MG Tab Take 1 Tab by mouth every evening. 30 Tab 11   • lisinopril (PRINIVIL) 10 MG Tab Take 1 Tab by mouth every day. 30 Tab 11   • apixaban (ELIQUIS) 5mg Tab Take 1 Tab by mouth 2 Times a Day. (Patient not taking: Reported on 12/12/2018) 60 Tab 5     No facility-administered encounter medications on file as of 12/12/2018.      ROS     Objective:   /68 (BP Location: Left arm, Patient Position: Sitting, BP Cuff Size: Adult)   Pulse 80   Ht 1.778 m (5' 10\")   Wt 97.1 kg (214 lb)   SpO2 98%   BMI 30.71 kg/m²      Physical Exam   Constitutional: He appears well-developed and well-nourished.   Neck: No JVD present.   Cardiovascular: Normal rate and regular rhythm.    No murmur heard.  Pulmonary/Chest: Effort normal and breath sounds normal. He has no rales.   Abdominal: Soft. There is no tenderness.   Musculoskeletal: He exhibits no edema.     Lab Results   Component Value Date/Time    CHOLSTRLTOT 118 12/06/2018 12:16 PM    LDL 54 12/06/2018 12:16 PM    HDL 43 12/06/2018 12:16 PM    TRIGLYCERIDE 107 12/06/2018 12:16 PM       Lab Results   Component Value Date/Time    SODIUM 137 12/06/2018 12:16 PM    POTASSIUM 4.8 12/06/2018 12:16 PM    CHLORIDE 103 12/06/2018 12:16 PM    CO2 26 12/06/2018 12:16 PM    GLUCOSE 150 (H) 12/06/2018 12:16 PM    BUN 23 (H) 12/06/2018 12:16 PM    CREATININE 0.82 12/06/2018 12:16 PM     Lab Results   Component Value Date/Time    ALKPHOSPHAT 49 12/06/2018 12:16 PM    ASTSGOT 15 12/06/2018 12:16 PM    ALTSGPT 21 12/06/2018 12:16 PM    TBILIRUBIN 0.5 12/06/2018 12:16 PM      Lab Results   "   Component Value Date/Time    BNPBTYPENAT 327 (H) 02/14/2018 12:45 PM        Transthoracic  Echo Report  CONCLUSIONS  Mildly reduced left ventricular systolic function.  Left ventricular ejection fraction is visually estimated to be 48%.  Aortic sclerosis without stenosis.  Unable to estimate pulmonary artery pressure due to an inadequate   tricuspid regurgitant jet.    Exam Date:         06/01/2018    Assessment:     1. ACC/AHA stage C systolic heart failure (HCC)     2. Congestive heart failure, NYHA class 2 and ACC/AHA stage C (HCC)     3. Chronic anticoagulation         Medical Decision Making:  Today's Assessment / Status / Plan:     Mr. Small is clinically stable.  I would like to review his CTA report from his hospitalization in February.  Specifically I would like to look and see if he has any evidence of atherosclerotic vascular disease.  His lipid status appears to be under good control.  He is asymptomatic from a cardiovascular standpoint.  I would like him to increase his activity level.  He will follow-up in about 6 months with a repeat echo.      Andre Falk M.D.  [address]  [address]  [facility] [address]  VIA In Basket

## 2018-12-12 NOTE — TELEPHONE ENCOUNTER
[facility] called to obtain report of 2/14/2018 CT chest, spoke with Cher, faxed request to [phone] with receipt confirmation.

## 2018-12-12 NOTE — PROGRESS NOTES
Chief Complaint   Patient presents with   • CHF (Diastolic)     F/V: 6 MO/ LABS IN EPIC       Subjective:   Ryan Small is a 58 y.o. male who presents today for follow-up post admission for massive bilateral pulmonary emboli and recurrent cardiomyopathy with an ejection fraction of approximately 20% in 2/2018.  His ejection fraction improved to approximately 50%.    He denies any chest discomfort, dyspnea on exertion, PND or orthopnea.  He does occasionally note some dependent edema..  He has had no palpitations or lightheadedness.    Past Medical History:   Diagnosis Date   • CVA (cerebral vascular accident) ([facility])     left sided weakness   • DM II (diabetes mellitus, type II), controlled ([facility])    • TIA (transient ischemic attack)      Past Surgical History:   Procedure Laterality Date   • TONSILLECTOMY       History reviewed. No pertinent family history.  Social History     Social History   • Marital status:      Spouse name: N/A   • Number of children: N/A   • Years of education: N/A     Occupational History   • Not on file.     Social History Main Topics   • Smoking status: Former Smoker     Packs/day: 2.00     Years: 5.00   • Smokeless tobacco: Never Used   • Alcohol use Yes      Comment: occ, none since hospital discharge (02/2018)   • Drug use: No   • Sexual activity: Yes     Partners: Female     Other Topics Concern   • Not on file     Social History Narrative   • No narrative on file     No Known Allergies  Outpatient Encounter Prescriptions as of 12/12/2018   Medication Sig Dispense Refill   • dabigatran (PRADAXA) 150 MG Cap capsule Take 150 mg by mouth 2 Times a Day.     • spironolactone (ALDACTONE) 25 MG Tab Take 1 Tab by mouth every day. 30 Tab 11   • metformin (GLUCOPHAGE) 1000 MG tablet Take 1 Tab by mouth 2 times a day, with meals. 180 Tab 2   • metoprolol SR (TOPROL XL) 100 MG TABLET SR 24 HR Take 1 Tab by mouth every evening. 90 Tab 3   • furosemide (LASIX) 20 MG Tab Take 1 Tab by  "mouth 1 time daily as needed. (Patient taking differently: Take 20 mg by mouth every day.) 30 Tab 11   • rosuvastatin (CRESTOR) 20 MG Tab Take 1 Tab by mouth every evening. 30 Tab 11   • lisinopril (PRINIVIL) 10 MG Tab Take 1 Tab by mouth every day. 30 Tab 11   • apixaban (ELIQUIS) 5mg Tab Take 1 Tab by mouth 2 Times a Day. (Patient not taking: Reported on 12/12/2018) 60 Tab 5     No facility-administered encounter medications on file as of 12/12/2018.      ROS     Objective:   /68 (BP Location: Left arm, Patient Position: Sitting, BP Cuff Size: Adult)   Pulse 80   Ht 1.778 m (5' 10\")   Wt 97.1 kg (214 lb)   SpO2 98%   BMI 30.71 kg/m²     Physical Exam   Constitutional: He appears well-developed and well-nourished.   Neck: No JVD present.   Cardiovascular: Normal rate and regular rhythm.    No murmur heard.  Pulmonary/Chest: Effort normal and breath sounds normal. He has no rales.   Abdominal: Soft. There is no tenderness.   Musculoskeletal: He exhibits no edema.     Lab Results   Component Value Date/Time    CHOLSTRLTOT 118 12/06/2018 12:16 PM    LDL 54 12/06/2018 12:16 PM    HDL 43 12/06/2018 12:16 PM    TRIGLYCERIDE 107 12/06/2018 12:16 PM       Lab Results   Component Value Date/Time    SODIUM 137 12/06/2018 12:16 PM    POTASSIUM 4.8 12/06/2018 12:16 PM    CHLORIDE 103 12/06/2018 12:16 PM    CO2 26 12/06/2018 12:16 PM    GLUCOSE 150 (H) 12/06/2018 12:16 PM    BUN 23 (H) 12/06/2018 12:16 PM    CREATININE 0.82 12/06/2018 12:16 PM     Lab Results   Component Value Date/Time    ALKPHOSPHAT 49 12/06/2018 12:16 PM    ASTSGOT 15 12/06/2018 12:16 PM    ALTSGPT 21 12/06/2018 12:16 PM    TBILIRUBIN 0.5 12/06/2018 12:16 PM      Lab Results   Component Value Date/Time    BNPBTYPENAT 327 (H) 02/14/2018 12:45 PM        Transthoracic  Echo Report  CONCLUSIONS  Mildly reduced left ventricular systolic function.  Left ventricular ejection fraction is visually estimated to be 48%.  Aortic sclerosis without " stenosis.  Unable to estimate pulmonary artery pressure due to an inadequate   tricuspid regurgitant jet.    Exam Date:         06/01/2018    Assessment:     1. ACC/AHA stage C systolic heart failure (HCC)     2. Congestive heart failure, NYHA class 2 and ACC/AHA stage C (HCC)     3. Chronic anticoagulation         Medical Decision Making:  Today's Assessment / Status / Plan:     Mr. Small is clinically stable.  I would like to review his CTA report from his hospitalization in February.  Specifically I would like to look and see if he has any evidence of atherosclerotic vascular disease.  His lipid status appears to be under good control.  He is asymptomatic from a cardiovascular standpoint.  I would like him to increase his activity level.  He will follow-up in about 6 months with a repeat echo.

## 2018-12-12 NOTE — PROGRESS NOTES
Target end date:Indefinite     Indication: DVT, PE     Drug: Pradaxa 150 mg BID         Health Status Since Last Assessment   Patient denies any new relevant medical problems, ED visits or hospitalizations   Patient denies any embolic events (stroke/tia/systemic embolism)    Adherence with DOAC Therapy   Pt has never missed any doses in the average week    Bleeding Risk Assessment     Denies Epistaxis   Pt denies any excessive or unusual bleeding/hematomas.  Pt denies any GI bleeds or hematemesis.  Pt denies any concerning daily headache or sub dural hematoma symptoms.     Pt denies any hematuria or abnormal vaginal bleeding.   Latest Hemoglobin 13.4   ETOH overuse rarely drinks     Creatinine Clearance/Renal Function     Latest ClCr 133.4 mL/min     Drug Interactions   Platelets: 358   ASA/other antiplatelets none   NSAID unknown   Other drug interactions none   x Verified no anticonvulsant or azole therapy, education provided for future use.     Examination   Blood Pressure 125/51 mmHg   Symptomatic hypotension unknown   Significant gait impairment/imbalance/high risk for falls? unknown    Final Assessment and Recommendations:   Patient appears stable from the anticoagulation staindpoint.     Benefits of continued DOAC therapy outweigh risks for this patient   Recommend pt continue with current DOAC therapy Pradaxa 150 mg (with dose adjustment)   Pt currently does not have a prescription drug plan and he reports that he will not have one for  next year either.  Current plan is to use pradaxa samples to get through the holidays.  In the  beginning of next year PT will switch to warfarin.       Other Actions: Gave PT enough samples of pradaxa to last through 01/16/19.      Follow up:   Will follow up with patient in the clinic on 01/16/19 to switch from Pradaxa to Warfarin.         Rudy Chan, Pharmacy Intern    Tae Chavez, PharmD

## 2018-12-12 NOTE — LETTER
[facility]   [address]  [place] [address]  Phone: [phone]  Fax: [phone]              Ryan Small  1960    Encounter Date: 12/12/2018    Michel Gonzalez M.D.          PROGRESS NOTE:  Chief Complaint   Patient presents with   • CHF (Diastolic)     F/V: 6 MO/ LABS IN EPIC       Subjective:   Ryan Small is a 58 y.o. male who presents today for follow-up post a recent admission for massive bilateral pulmonary emboli and recurrent cardiomyopathy with an ejection fraction of approximately 20%.  His ejection fraction improved to approximately 50%.    He denies any chest discomfort, dyspnea on exertion, PND or orthopnea.  He does occasionally note some dependent edema..  He has had no palpitations or lightheadedness.    Past Medical History:   Diagnosis Date   • CVA (cerebral vascular accident) ([facility])     left sided weakness   • DM II (diabetes mellitus, type II), controlled ([facility])    • TIA (transient ischemic attack)      Past Surgical History:   Procedure Laterality Date   • TONSILLECTOMY       History reviewed. No pertinent family history.  Social History     Social History   • Marital status:      Spouse name: N/A   • Number of children: N/A   • Years of education: N/A     Occupational History   • Not on file.     Social History Main Topics   • Smoking status: Former Smoker     Packs/day: 2.00     Years: 5.00   • Smokeless tobacco: Never Used   • Alcohol use Yes      Comment: occ, none since hospital discharge (02/2018)   • Drug use: No   • Sexual activity: Yes     Partners: Female     Other Topics Concern   • Not on file     Social History Narrative   • No narrative on file     No Known Allergies  Outpatient Encounter Prescriptions as of 12/12/2018   Medication Sig Dispense Refill   • dabigatran (PRADAXA) 150 MG Cap capsule Take 150 mg by mouth 2 Times a Day.     • spironolactone (ALDACTONE) 25 MG Tab Take 1 Tab by mouth  "every day. 30 Tab 11   • metformin (GLUCOPHAGE) 1000 MG tablet Take 1 Tab by mouth 2 times a day, with meals. 180 Tab 2   • metoprolol SR (TOPROL XL) 100 MG TABLET SR 24 HR Take 1 Tab by mouth every evening. 90 Tab 3   • furosemide (LASIX) 20 MG Tab Take 1 Tab by mouth 1 time daily as needed. (Patient taking differently: Take 20 mg by mouth every day.) 30 Tab 11   • rosuvastatin (CRESTOR) 20 MG Tab Take 1 Tab by mouth every evening. 30 Tab 11   • lisinopril (PRINIVIL) 10 MG Tab Take 1 Tab by mouth every day. 30 Tab 11   • apixaban (ELIQUIS) 5mg Tab Take 1 Tab by mouth 2 Times a Day. (Patient not taking: Reported on 12/12/2018) 60 Tab 5     No facility-administered encounter medications on file as of 12/12/2018.      ROS     Objective:   /68 (BP Location: Left arm, Patient Position: Sitting, BP Cuff Size: Adult)   Pulse 80   Ht 1.778 m (5' 10\")   Wt 97.1 kg (214 lb)   SpO2 98%   BMI 30.71 kg/m²      Physical Exam   Constitutional: He appears well-developed and well-nourished.   Neck: No JVD present.   Cardiovascular: Normal rate and regular rhythm.    No murmur heard.  Pulmonary/Chest: Effort normal and breath sounds normal. He has no rales.   Abdominal: Soft. There is no tenderness.   Musculoskeletal: He exhibits no edema.     Lab Results   Component Value Date/Time    CHOLSTRLTOT 118 12/06/2018 12:16 PM    LDL 54 12/06/2018 12:16 PM    HDL 43 12/06/2018 12:16 PM    TRIGLYCERIDE 107 12/06/2018 12:16 PM       Lab Results   Component Value Date/Time    SODIUM 137 12/06/2018 12:16 PM    POTASSIUM 4.8 12/06/2018 12:16 PM    CHLORIDE 103 12/06/2018 12:16 PM    CO2 26 12/06/2018 12:16 PM    GLUCOSE 150 (H) 12/06/2018 12:16 PM    BUN 23 (H) 12/06/2018 12:16 PM    CREATININE 0.82 12/06/2018 12:16 PM     Lab Results   Component Value Date/Time    ALKPHOSPHAT 49 12/06/2018 12:16 PM    ASTSGOT 15 12/06/2018 12:16 PM    ALTSGPT 21 12/06/2018 12:16 PM    TBILIRUBIN 0.5 12/06/2018 12:16 PM      Lab Results   Component " Value Date/Time    BNPBTYPENAT 327 (H) 02/14/2018 12:45 PM        Transthoracic  Echo Report  CONCLUSIONS  Mildly reduced left ventricular systolic function.  Left ventricular ejection fraction is visually estimated to be 48%.  Aortic sclerosis without stenosis.  Unable to estimate pulmonary artery pressure due to an inadequate   tricuspid regurgitant jet.    Exam Date:         06/01/2018    Assessment:     1. ACC/AHA stage C systolic heart failure (HCC)     2. Congestive heart failure, NYHA class 2 and ACC/AHA stage C (HCC)     3. Chronic anticoagulation         Medical Decision Making:  Today's Assessment / Status / Plan:     Mr. Small is clinically stable.  I would like to review his CTA report from his hospitalization in February.  Specifically I would like to look and see if he has any evidence of atherosclerotic vascular disease.  His lipid status appears to be under good control.  He is asymptomatic from a cardiovascular standpoint.  I would like him to increase his activity level.  He will follow-up in about 6 months with a repeat echo.         Andre Falk M.D.  [address]  [address]  [facility] [address]  VIA In Basket

## 2018-12-19 ENCOUNTER — APPOINTMENT (OUTPATIENT)
Dept: ADMISSIONS | Facility: MEDICAL CENTER | Age: 58
End: 2018-12-19
Attending: INTERNAL MEDICINE
Payer: MEDICARE

## 2018-12-19 ENCOUNTER — OFFICE VISIT (OUTPATIENT)
Dept: MEDICAL GROUP | Facility: MEDICAL CENTER | Age: 58
End: 2018-12-19
Payer: MEDICARE

## 2018-12-19 VITALS
OXYGEN SATURATION: 99 % | SYSTOLIC BLOOD PRESSURE: 118 MMHG | HEART RATE: 65 BPM | TEMPERATURE: 98 F | HEIGHT: 70 IN | BODY MASS INDEX: 30.21 KG/M2 | WEIGHT: 211 LBS | DIASTOLIC BLOOD PRESSURE: 64 MMHG

## 2018-12-19 DIAGNOSIS — Z79.01 CHRONIC ANTICOAGULATION: ICD-10-CM

## 2018-12-19 DIAGNOSIS — Z86.711 HISTORY OF PULMONARY EMBOLISM: ICD-10-CM

## 2018-12-19 DIAGNOSIS — Z01.810 PRE-OPERATIVE CARDIOVASCULAR EXAMINATION: ICD-10-CM

## 2018-12-19 DIAGNOSIS — I82.5Y2 CHRONIC DEEP VEIN THROMBOSIS (DVT) OF PROXIMAL VEIN OF LEFT LOWER EXTREMITY (HCC): ICD-10-CM

## 2018-12-19 DIAGNOSIS — I50.9 CONGESTIVE HEART FAILURE, NYHA CLASS 2 AND ACC/AHA STAGE C (HCC): ICD-10-CM

## 2018-12-19 PROCEDURE — 99214 OFFICE O/P EST MOD 30 MIN: CPT | Performed by: FAMILY MEDICINE

## 2018-12-19 NOTE — PROGRESS NOTES
Subjective:   Chief Complaint/History of Present Illness:  Ryan Small is a 58 y.o. 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    We discussed that prediabetes/diabetes mellitus type 2 are medical conditions of lifestyle habits (less than optimal dietary choices, insufficient cardiovascular exercise), and that they can be controlled (in part or in whole) by these lifestyle changes.     Furthermore, we discussed that while it is good that patient would like to pursue lifestyle changes at present time (patient was able to get to normal A1c levels previously on lifestyle changes, alone), that we may need to add on other pharmacotherapy to help him adjust his glycemic control to his level of lifestyle changes as well as his eating habits.  Patient admits that he likes to eat simple carbohydrates and meat.    Patient is in agreement.     Please see assessment/plan as below for further details.    ROS is NEGATIVE for blurred vision, polydipsia, polyuria, diaphoresis, palpitations,  fatigue, irritability, flank pain, BLE paresthesias.    (HCC) Congestive heart failure, NYHA class 2 and ACC/AHA stage C  Chronic, stable, well-controlled, taking medication as directed.  Patient was recently switched over to Pradaxa at the end of November due to cost of Eliquis.  Thereafter, there has been discussion on whether to keep patient on this medication or switch him over to warfarin.  Warfarin may prove difficult, as patient does live in [place], and thus has difficulty with attending anticoagulation visits due to distance.  However, this will be discussed at next visit with his anticoagulation expert, scheduled to be in January 2019.    ROS is NEGATIVE for dizziness, generalized weakness/fatigue, cold sweats,  vision/hearing changes, jaw pain/paresthesias, BUE pain/paresthesias/numbness/weakness, chest pain/pressure, palpitations, dyspnea, nausea, RUQ abdominal pain, oliguria/anuria, BLE edema.     (HCC) Chronic deep vein thrombosis (DVT) of proximal vein of left lower extremity  Chronic, stable, well-controlled, taking medication as directed.     ROS is NEGATIVE for fevers, chills, generalized weakness/fatigue, dizziness, vertigo, lightheadedness, tachypnea, dyspnea, pain on deep inspiration, tachycardia, palpitations, bilateral lower extremity pain/paresthesias/pallor/poikilothermia/weakness/paralysis.      H/O pulmonary embolism  Please see notes from same date of service 12/19/2018 regarding chronic DVT.      Patient Active Problem List    Diagnosis Date Noted   • H/O pulmonary embolism 02/14/2018     Priority: High   • (HCC) Chronic deep vein thrombosis (DVT) of proximal vein of left lower extremity 02/15/2018     Priority: Medium   • (HCC) Congestive heart failure, NYHA class 2 and ACC/AHA stage C 12/12/2018   • Obesity (BMI 30-39.9) 09/17/2018   • ([facility]) Onychomycosis of multiple toenails with type 2 diabetes mellitus 08/15/2018   • Actinic keratosis of right cheek 08/15/2018   • (HCC)  "Nonproliferative retinopathy due to secondary diabetes mellitus 05/06/2018   • Intrinsic eczema 05/02/2018   • H/O CVA (cerebrovascular accident) 03/21/2018   • Chronic anticoagulation 03/21/2018   • First degree AV block 03/21/2018   • (HCC) 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     Current Medications:     Current Outpatient Prescriptions   Medication Sig Dispense Refill   • dabigatran (PRADAXA) 150 MG Cap capsule Take 150 mg by mouth 2 Times a Day.     • spironolactone (ALDACTONE) 25 MG Tab Take 1 Tab by mouth every day. 30 Tab 11   • metformin (GLUCOPHAGE) 1000 MG tablet Take 1 Tab by mouth 2 times a day, with meals. 180 Tab 2   • metoprolol SR (TOPROL XL) 100 MG TABLET SR 24 HR Take 1 Tab by mouth every evening. 90 Tab 3   • furosemide (LASIX) 20 MG Tab Take 1 Tab by mouth 1 time daily as needed. (Patient taking differently: Take 20 mg by mouth every day.) 30 Tab 11   • rosuvastatin (CRESTOR) 20 MG Tab Take 1 Tab by mouth every evening. 30 Tab 11   • lisinopril (PRINIVIL) 10 MG Tab Take 1 Tab by mouth every day. 30 Tab 11     No current facility-administered medications for this visit.         Social History:     Social History   Substance Use Topics   • Smoking status: Former Smoker     Packs/day: 2.00     Years: 5.00     Quit date: 1/1/2016   • Smokeless tobacco: Never Used   • Alcohol use Yes      Comment: 2 per week       ROS:     - NOTE: All other systems reviewed and are negative, except as in HPI.     Objective:   Physical Exam:    Vitals: Blood pressure 118/64, pulse 65, temperature 36.7 °C (98 °F), height 1.778 m (5' 10\"), weight 95.7 kg (211 lb), SpO2 99 %.   BMI: Body mass index is 30.28 kg/m².   General/Constitutional: Vitals as above, Well nourished, well developed male in no acute " distress   Head/Eyes: Head is grossly normal & atraumatic, bilateral conjunctivae clear and not injected, bilateral EOMI, bilateral PERRL   ENT: Bilateral external ears grossly normal in appearance, Hearing grossly intact, External nares normal in appearance and without discharge/bleeding   Respiratory: No respiratory distress, bilateral lungs are clear to ausculation in all lung fields (anterior/lateral/posterior), no wheezing/rhonchi/rales   Cardiovascular: Regular rate and rhythm without murmur/gallops/rubs, distal pulses are intact and equal bilaterally (radial, posterior tibial), no bilateral lower extremity edema   MSK: Gait grossly normal & not antalgic   Integumentary: No apparent rashes   Psych: Judgment grossly appropriate, no apparent depression/anxiety    Health Maintenance:     - 12/07/18 -- FIT POSITIVE --> Colonoscopy scheduled for tomorrow 12/20/18    Imaging/Labs:     - 12/11/18 -- A1c uncontrolled 9.2%, labs o/w WNL    Assessment and Plan:   1. 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    2. Congestive heart failure, NYHA class 2 and ACC/AHA stage C (HCC)  Chronic, stable, well-controlled.  Continue taking medication as directed.     3. Chronic deep vein thrombosis (DVT) of proximal vein of left lower extremity (HCC)  4. H/O pulmonary embolism  5. Chronic anticoagulation  Chronic, stable, well-controlled.  Continue taking medication as directed.  Patient may need to be switched over to warfarin, due to cost.      RTC: in 3months for follow-up, including urine microalbumin to creatinine ratio as well as point-of-care A1c.    PLEASE NOTE: This dictation was created using voice recognition software. I have made every  reasonable attempt to correct obvious errors, but I expect that there are errors of grammar and possibly content that I did not discover before finalizing the note.

## 2018-12-19 NOTE — ASSESSMENT & PLAN NOTE
Chronic, stable, well-controlled, taking medication as directed.     ROS is NEGATIVE for fevers, chills, generalized weakness/fatigue, dizziness, vertigo, lightheadedness, tachypnea, dyspnea, pain on deep inspiration, tachycardia, palpitations, bilateral lower extremity pain/paresthesias/pallor/poikilothermia/weakness/paralysis.

## 2018-12-19 NOTE — ASSESSMENT & PLAN NOTE
We discussed that patient is diagnosed with diabetes, uncontrolled, given that the A1c is:   Lab Results   Component Value Date/Time    HBA1C 9.2 (H) 12/06/2018 12:17 PM        Patient is currently taking (metformin) for glycemic control, and (rosuvastatin) for health maintenance related to diabetes mellitus.    We discussed that prediabetes/diabetes mellitus type 2 are medical conditions of lifestyle habits (less than optimal dietary choices, insufficient cardiovascular exercise), and that they can be controlled (in part or in whole) by these lifestyle changes.     Furthermore, we discussed that while it is good that patient would like to pursue lifestyle changes at present time (patient was able to get to normal A1c levels previously on lifestyle changes, alone), that we may need to add on other pharmacotherapy to help him adjust his glycemic control to his level of lifestyle changes as well as his eating habits.  Patient admits that he likes to eat simple carbohydrates and meat.    Patient is in agreement.     Please see assessment/plan as below for further details.    ROS is NEGATIVE for blurred vision, polydipsia, polyuria, diaphoresis, palpitations, fatigue, irritability, flank pain, BLE paresthesias.

## 2018-12-19 NOTE — ASSESSMENT & PLAN NOTE
Chronic, stable, well-controlled, taking medication as directed.  Patient was recently switched over to Pradaxa at the end of November due to cost of Eliquis.  Thereafter, there has been discussion on whether to keep patient on this medication or switch him over to warfarin.  Warfarin may prove difficult, as patient does live in [place], and thus has difficulty with attending anticoagulation visits due to distance.  However, this will be discussed at next visit with his anticoagulation expert, scheduled to be in January 2019.    ROS is NEGATIVE for dizziness, generalized weakness/fatigue, cold sweats,  vision/hearing changes, jaw pain/paresthesias, BUE pain/paresthesias/numbness/weakness, chest pain/pressure, palpitations, dyspnea, nausea, RUQ abdominal pain, oliguria/anuria, BLE edema.

## 2018-12-20 ENCOUNTER — HOSPITAL ENCOUNTER (OUTPATIENT)
Facility: MEDICAL CENTER | Age: 58
End: 2018-12-20
Attending: INTERNAL MEDICINE | Admitting: INTERNAL MEDICINE
Payer: MEDICARE

## 2018-12-20 VITALS
TEMPERATURE: 98.2 F | BODY MASS INDEX: 29.51 KG/M2 | HEIGHT: 70 IN | RESPIRATION RATE: 16 BRPM | DIASTOLIC BLOOD PRESSURE: 76 MMHG | OXYGEN SATURATION: 97 % | HEART RATE: 75 BPM | SYSTOLIC BLOOD PRESSURE: 149 MMHG | WEIGHT: 206.13 LBS

## 2018-12-20 LAB
GLUCOSE BLD-MCNC: 98 MG/DL (ref 65–99)
PATHOLOGY CONSULT NOTE: NORMAL

## 2018-12-20 PROCEDURE — 160002 HCHG RECOVERY MINUTES (STAT): Performed by: INTERNAL MEDICINE

## 2018-12-20 PROCEDURE — 503192 HCHG GOLDPROBE, 10FR: Performed by: INTERNAL MEDICINE

## 2018-12-20 PROCEDURE — 700111 HCHG RX REV CODE 636 W/ 250 OVERRIDE (IP)

## 2018-12-20 PROCEDURE — 160035 HCHG PACU - 1ST 60 MINS PHASE I: Performed by: INTERNAL MEDICINE

## 2018-12-20 PROCEDURE — 160025 RECOVERY II MINUTES (STATS): Performed by: INTERNAL MEDICINE

## 2018-12-20 PROCEDURE — 700101 HCHG RX REV CODE 250

## 2018-12-20 PROCEDURE — 502240 HCHG MISC OR SUPPLY RC 0272: Performed by: INTERNAL MEDICINE

## 2018-12-20 PROCEDURE — 88305 TISSUE EXAM BY PATHOLOGIST: CPT

## 2018-12-20 PROCEDURE — 160209 HCHG ENDO MINUTES - EA ADDL 1 MIN LEVEL 5: Performed by: INTERNAL MEDICINE

## 2018-12-20 PROCEDURE — 160009 HCHG ANES TIME/MIN: Performed by: INTERNAL MEDICINE

## 2018-12-20 PROCEDURE — 160046 HCHG PACU - 1ST 60 MINS PHASE II: Performed by: INTERNAL MEDICINE

## 2018-12-20 PROCEDURE — 503369 HCHG GOLDPROBE, 7 FR: Performed by: INTERNAL MEDICINE

## 2018-12-20 PROCEDURE — 82962 GLUCOSE BLOOD TEST: CPT

## 2018-12-20 PROCEDURE — 160048 HCHG OR STATISTICAL LEVEL 1-5: Performed by: INTERNAL MEDICINE

## 2018-12-20 PROCEDURE — 160204 HCHG ENDO MINUTES - 1ST 30 MINS LEVEL 5: Performed by: INTERNAL MEDICINE

## 2018-12-20 RX ORDER — SODIUM CHLORIDE, SODIUM LACTATE, POTASSIUM CHLORIDE, CALCIUM CHLORIDE 600; 310; 30; 20 MG/100ML; MG/100ML; MG/100ML; MG/100ML
INJECTION, SOLUTION INTRAVENOUS ONCE
Status: COMPLETED | OUTPATIENT
Start: 2018-12-20 | End: 2018-12-20

## 2018-12-20 RX ADMIN — SODIUM CHLORIDE, SODIUM LACTATE, POTASSIUM CHLORIDE, CALCIUM CHLORIDE: 600; 310; 30; 20 INJECTION, SOLUTION INTRAVENOUS at 12:09

## 2018-12-20 ASSESSMENT — PAIN SCALES - GENERAL
PAINLEVEL_OUTOF10: 0

## 2018-12-20 NOTE — DISCHARGE INSTRUCTIONS
ENDOSCOPY HOME CARE INSTRUCTIONS      COLONOSCOPY OR FLEXIBLE SIGMOIDOSCOPY  1. If you received a barium enema, take a mild laxative such as dulcolax, Kari's M.O., or Milk of Magnesia to clean out the barium.  2. Drink plenty of fluids. Eat a diet high in fiber (whole-grain breads, fresh fruit and vegetables).  3. You may notice a few drops of blood with your first bowel movement. If you develop any large amount of bleeding, black stools, a fever, or abdominal pain, call your doctor right away.  4. Call your doctor for test results in {SRG NUMBER OF DAYS:6908902} {SRG DAY WEEK MONTH:5816133}.  5. Don't drive or drink alcohol for 24 hours. The medication you received will make you too drowsy.  6. No heavy lifting, no Aspirin products or Aspirin for 5 days ***  7. Additional instructions: ***  8. Prescriptions: ***    Resume regular diet.   Resume Eliquis TOMORROW AM. Can resume all other meds today.   I'll contact him with pathology results.    You should call 911 if you develop problems with breathing or chest pain.  If any questions arise, call your doctor. If your doctor is not available, please feel free to call [phone].     You can also call the HEALTH HOTLINE open 24 hours/day, 7 days/week and speak to a nurse at [phone], or toll free [phone].    Depression / Suicide Risk    As you are discharged from this [facility] facility, it is important to learn how to keep safe from harming yourself.    Recognize the warning signs:  · Abrupt changes in personality, positive or negative- including increase in energy   · Giving away possessions  · Change in eating patterns- significant weight changes-  positive or negative  · Change in sleeping patterns- unable to sleep or sleeping all the time   · Unwillingness or inability to communicate  · Depression  · Unusual sadness, discouragement and loneliness  · Talk of wanting to die  · Neglect of personal appearance   · Rebelliousness- reckless  behavior  · Withdrawal from people/activities they love  · Confusion- inability to concentrate     If you or a loved one observes any of these behaviors or has concerns about self-harm, here's what you can do:  · Talk about it- your feelings and reasons for harming yourself  · Remove any means that you might use to hurt yourself (examples: pills, rope, extension cords, firearm)  · Get professional help from the community (Mental Health, Substance Abuse, psychological counseling)  · Do not be alone:Call your Safe Contact- someone whom you trust who will be there for you.  · Call your local CRISIS HOTLINE 418-4503 or 937-465-8476  · Call your local Children's Mobile Crisis Response Team [place] (623) 531-2365 or www.Notify Technology  · Call the toll free National Suicide Prevention Hotlines   · National Suicide Prevention Lifeline 056-911-RCUR (3239)  · National Hope Line Network 800-SUICIDE (340-1030)    I acknowledge receipt and understanding of these Home Care Instructions.

## 2018-12-20 NOTE — OR SURGEON
Immediate Post OP Note    PreOp Diagnosis: Outpatient screening colonoscopy.     PostOp Diagnosis:   1. 8mm sessile hepatic flexure polyp cold snared.  2. Redundant colon.     Procedure(s):  COLONOSCOPY - Wound Class: None    Surgeon(s):  Varun Thomas M.D.    Anesthesiologist/Type of Anesthesia:  Anesthesiologist: Leroy Milligan M.D./General    Surgical Staff:  Circulator: Panfilo Duarte R.N.  Endoscopy Technician: Mellisa Prince; Tess Bhakta    Specimens removed if any:  ID Type Source Tests Collected by Time Destination   A : hepatic flexure 8mm polyp at 80cm Tissue Colon PATHOLOGY SPECIMEN Varun Thomas M.D. 12/20/2018  2:53 PM        Estimated Blood Loss: none    Findings: see above.     Complications: none immediate  Plan:  1. DC to home when awake and alert.  2. I'll contact him with pathology results.  3. Resume Eliquis tomorrow am.         12/20/2018 3:04 PM Varun Thomas M.D.

## 2018-12-20 NOTE — OP REPORT
DATE OF SERVICE:  12/20/2018    PROCEDURE:  Outpatient screening colonoscopy.    INDICATION:  A 58-year-old male undergoing colon cancer screening.    ASA:  Class III.    SEDATION:  General anesthesia per Dr. Davalos.    FINDINGS:  1.  An 8-mm sessile polyp at the hepatic flexure, cold snared.  2.  Otherwise, normal exam to the cecum.  3.  Redundant colon.    DESCRIPTION OF PROCEDURE:  After informed consent and a time-out, he was   administered IV propofol sedation.  A rectal exam was done and was normal.    The Olympus flexible videocolonoscope was advanced into the rectum and   proximally to the cecum, which was identified by the appendiceal orifice and   the ileocecal valve.  The colon was redundant and he had to be rolled into a   supine position and before I was able to reach the cecum.  He also required   abdominal wall pressure.  The cecum was identified by the appendiceal orifice   and the ileocecal valve.  The scope was then withdrawn while the mucosa was   examined.  At the hepatic flexure at 80 cm, there was an 8-mm sessile polyp   removed via cold snare polypectomy.  The remainder of the colon was examined   and was normal.  Retroflex view of the rectum was normal.  He tolerated the   procedure well and went to recovery room in stable condition.    IMPRESSION:  Screening colonoscopy showing an 8-mm colon polyp, cold resected.    PLAN:  1.  Discharge to home when awake and alert.  2.  I will contact him with pathology results.  3.  Resume Eliquis tomorrow morning.       ____________________________________     MD XENIA LEON / NTS    DD:  12/20/2018 15:08:17  DT:  12/20/2018 15:51:22    D#:  [id number]  Job#:  [id number]    cc: Andre Falk MD

## 2018-12-21 NOTE — OR NURSING
"1559 patient to stage 2  Patient settled in recliner chair post short ambulation from [facility] - pt dressed with assist by CNA. Pt denies pain or nausea. \"I'm ready to get out of here\".   1629 D/Phi to care of family post uneventful stay in PACU 2.    "

## 2019-03-07 DIAGNOSIS — I10 ESSENTIAL HYPERTENSION: Primary | ICD-10-CM

## 2019-03-07 RX ORDER — LISINOPRIL 10 MG/1
TABLET ORAL
Qty: 90 TAB | Refills: 3 | Status: SHIPPED | OUTPATIENT
Start: 2019-03-07 | End: 2024-01-25

## 2019-03-20 ENCOUNTER — OFFICE VISIT (OUTPATIENT)
Dept: MEDICAL GROUP | Facility: MEDICAL CENTER | Age: 59
End: 2019-03-20
Payer: MEDICARE

## 2019-03-20 VITALS
OXYGEN SATURATION: 98 % | BODY MASS INDEX: 28.49 KG/M2 | SYSTOLIC BLOOD PRESSURE: 110 MMHG | WEIGHT: 199 LBS | TEMPERATURE: 97 F | HEIGHT: 70 IN | DIASTOLIC BLOOD PRESSURE: 60 MMHG | HEART RATE: 57 BPM

## 2019-03-20 DIAGNOSIS — E66.3 OVERWEIGHT (BMI 25.0-29.9): ICD-10-CM

## 2019-03-20 DIAGNOSIS — D12.6 TUBULAR ADENOMA OF COLON: ICD-10-CM

## 2019-03-20 DIAGNOSIS — E13.3299: ICD-10-CM

## 2019-03-20 PROBLEM — E11.3293 CONTROLLED TYPE 2 DIABETES MELLITUS WITH BOTH EYES AFFECTED BY MILD NONPROLIFERATIVE RETINOPATHY WITHOUT MACULAR EDEMA, WITHOUT LONG-TERM CURRENT USE OF INSULIN (HCC): Status: ACTIVE | Noted: 2018-02-15

## 2019-03-20 PROCEDURE — 99214 OFFICE O/P EST MOD 30 MIN: CPT | Performed by: FAMILY MEDICINE

## 2019-03-20 ASSESSMENT — PATIENT HEALTH QUESTIONNAIRE - PHQ9: CLINICAL INTERPRETATION OF PHQ2 SCORE: 0

## 2019-03-22 DIAGNOSIS — Z79.01 CURRENT USE OF LONG TERM ANTICOAGULATION: ICD-10-CM

## 2019-04-01 DIAGNOSIS — I50.9 HEART FAILURE, NYHA CLASS 2 (HCC): ICD-10-CM

## 2019-04-01 DIAGNOSIS — I50.20 ACC/AHA STAGE C SYSTOLIC HEART FAILURE (HCC): ICD-10-CM

## 2019-04-01 NOTE — PROGRESS NOTES
Subjective:   Chief Complaint/History of Present Illness:  Ryan Small is a 58 y.o. 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    We discussed that prediabetes/diabetes mellitus type 2 are medical conditions of lifestyle habits (less than optimal dietary choices, insufficient cardiovascular exercise), and that they can be controlled (in part or in whole) by these lifestyle changes.     Furthermore, we discussed that at present time it is better to pursue lifestyle changes rather than changing medications. Patient is in agreement.     ROS is NEGATIVE for blurred vision, polydipsia, polyuria, diaphoresis, palpitations, fatigue, irritability, flank pain, BLE paresthesias.    Overweight (BMI 25.0-29.9)  Chronic, uncontrolled, patient advised to pursue lifestyle changes, particularly cardiovascular exercise and increasing proportion of plant-based nutrition.    (HCC) Nonproliferative retinopathy due to secondary diabetes mellitus  Chronic, unknown control, patient instructed to follow-up with Dr. Segun Garay.  Additionally,  patient advised that he needs to obtain better control over his glycemic metabolism otherwise he really can have worsened complications none only his eyes but also possible renal and neuropathic complications from his diabetes.  Patient verbalized understanding.    Tubular adenoma of colon -- 12/2018  New problem, uncontrolled, patient had colonoscopy in December 2018 as result of his positive fit test.  Patient was found to have tubular adenoma, therefore he will have repeat colonoscopy in December 2023.  Patient verbalized understanding.      Patient Active Problem List    Diagnosis Date Noted   • H/O pulmonary embolism 02/14/2018     Priority: High   • (HCC) Chronic deep vein thrombosis (DVT) of proximal vein of left lower extremity 02/15/2018     Priority: Medium   • Tubular adenoma of colon -- 12/2018 03/20/2019   • (HCC) Congestive heart failure, NYHA class 2 and ACC/AHA stage C 12/12/2018   • Overweight (BMI 25.0-29.9) 09/17/2018   • (HCC) Onychomycosis of multiple toenails with type 2 diabetes mellitus 08/15/2018   • Actinic keratosis of right cheek 08/15/2018   • (HCC) Nonproliferative retinopathy due to secondary diabetes mellitus 05/06/2018   • Intrinsic eczema 05/02/2018   • H/O CVA (cerebrovascular accident) 03/21/2018   • Chronic anticoagulation 03/21/2018   • First degree AV block 03/21/2018   • (HCC) 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     Current Medications:     Current Outpatient Prescriptions   Medication Sig Dispense Refill   • lisinopril (PRINIVIL) 10 MG Tab TAKE 1 TABLET BY MOUTH ONCE DAILY 90 Tab 3   • dabigatran (PRADAXA) 150 MG Cap capsule Take 150 mg by mouth 2 Times a Day.     • spironolactone (ALDACTONE) 25 MG Tab Take 1 Tab by mouth every day. 30 Tab 11   • metformin  "(GLUCOPHAGE) 1000 MG tablet Take 1 Tab by mouth 2 times a day, with meals. 180 Tab 2   • metoprolol SR (TOPROL XL) 100 MG TABLET SR 24 HR Take 1 Tab by mouth every evening. 90 Tab 3   • furosemide (LASIX) 20 MG Tab Take 1 Tab by mouth 1 time daily as needed. (Patient taking differently: Take 20 mg by mouth every day.) 30 Tab 11   • rosuvastatin (CRESTOR) 20 MG Tab Take 1 Tab by mouth every evening. 30 Tab 11     No current facility-administered medications for this visit.         Social History:     Social History   Substance Use Topics   • Smoking status: Former Smoker     Packs/day: 2.00     Years: 5.00     Quit date: 1/1/2016   • Smokeless tobacco: Never Used   • Alcohol use Yes      Comment: 2 per week       ROS:     - NOTE: All other systems reviewed and are negative, except as in HPI.     Objective:   Physical Exam:    Vitals: Blood pressure 110/60, pulse (!) 57, temperature 36.1 °C (97 °F), height 1.778 m (5' 10\"), weight 90.3 kg (199 lb), SpO2 98 %.   BMI: Body mass index is 28.55 kg/m².   General/Constitutional: Vitals as above, Well nourished, well developed male in no acute distress   Head/Eyes: Head is grossly normal & atraumatic, bilateral conjunctivae clear and not injected, bilateral EOMI, bilateral PERRL   ENT: Bilateral external ears grossly normal in appearance, Hearing grossly intact, External nares normal in appearance and without discharge/bleeding   Respiratory: No respiratory distress, bilateral lungs are clear to ausculation in all lung fields (anterior/lateral/posterior), no wheezing/rhonchi/rales   Cardiovascular: Regular rate and rhythm without murmur/gallops/rubs, distal pulses are intact and equal bilaterally (radial, posterior tibial), no bilateral lower extremity edema   MSK: Gait grossly normal & not antalgic   Integumentary: No apparent rashes   Psych: Judgment grossly appropriate, no apparent depression/anxiety    Health Maintenance:    - 12/20/18 -- Colonoscopy with tubular " adenoma    - 12/07/18 -- FIT POSITIVE --> Colonoscopy     Imaging/Labs:     - Reviewed and interpreted as in HPI above    Assessment and Plan:   1. 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    2. (HCC) Nonproliferative retinopathy due to secondary diabetes mellitus  Chronic, unknown control, continue follow-up with Dr. Segun Denise   - REFERRAL TO OPHTHALMOLOGY    3. Overweight (BMI 25.0-29.9)  Chronic, uncontrolled, patient advised to pursue lifestyle changes, particularly cardiovascular exercise and increasing proportion of plant-based nutrition.    4. Tubular adenoma of colon -- 12/2018  Established problem, uncontrolled, repeat colonoscopy in 5 years from December 2018.        RTC: In 6 months for Medicare Annual Wellness Visit, diabetes management.    PLEASE NOTE: This dictation was created using voice recognition software. I have made every reasonable attempt to correct obvious errors, but I expect that there are errors of grammar and possibly content that I did not discover before finalizing the note.

## 2019-04-01 NOTE — ASSESSMENT & PLAN NOTE
Chronic, unknown control, patient instructed to follow-up with Dr. Segun Garay.  Additionally, patient advised that he needs to obtain better control over his glycemic metabolism otherwise he really can have worsened complications none only his eyes but also possible renal and neuropathic complications from his diabetes.  Patient verbalized understanding.

## 2019-04-01 NOTE — ASSESSMENT & PLAN NOTE
New problem, uncontrolled, patient had colonoscopy in December 2018 as result of his positive fit test.  Patient was found to have tubular adenoma, therefore he will have repeat colonoscopy in December 2023.  Patient verbalized understanding.

## 2019-04-01 NOTE — ASSESSMENT & PLAN NOTE
We discussed that patient is diagnosed with diabetes, uncontrolled, given that the A1c is:   Lab Results   Component Value Date/Time    HBA1C 9.2 (H) 12/06/2018 12:17 PM        Patient is currently taking (metformin) for glycemic control, and (lisinopril, rosuvastatin) for health maintenance related to diabetes mellitus.    We discussed that prediabetes/diabetes mellitus type 2 are medical conditions of lifestyle habits (less than optimal dietary choices, insufficient cardiovascular exercise), and that they can be controlled (in part or in whole) by these lifestyle changes.     Furthermore, we discussed that at present time it is better to pursue lifestyle changes rather than changing medications. Patient is in agreement.     ROS is NEGATIVE for blurred vision, polydipsia, polyuria, diaphoresis, palpitations, fatigue, irritability, flank pain, BLE paresthesias.

## 2019-04-05 RX ORDER — FUROSEMIDE 20 MG/1
20 TABLET ORAL
Qty: 90 TAB | Refills: 0 | Status: SHIPPED | OUTPATIENT
Start: 2019-04-05 | End: 2024-01-25

## 2019-06-19 ENCOUNTER — OFFICE VISIT (OUTPATIENT)
Dept: CARDIOLOGY | Facility: MEDICAL CENTER | Age: 59
End: 2019-06-19
Payer: MEDICARE

## 2019-06-19 ENCOUNTER — HOSPITAL ENCOUNTER (OUTPATIENT)
Dept: CARDIOLOGY | Facility: MEDICAL CENTER | Age: 59
End: 2019-06-19
Attending: INTERNAL MEDICINE
Payer: MEDICARE

## 2019-06-19 VITALS
SYSTOLIC BLOOD PRESSURE: 126 MMHG | HEART RATE: 72 BPM | WEIGHT: 190.8 LBS | OXYGEN SATURATION: 96 % | DIASTOLIC BLOOD PRESSURE: 74 MMHG | BODY MASS INDEX: 27.32 KG/M2 | HEIGHT: 70 IN

## 2019-06-19 DIAGNOSIS — R94.30 ABNORMAL CARDIAC FUNCTION TEST: ICD-10-CM

## 2019-06-19 DIAGNOSIS — Z91.89 OTHER SPECIFIED PERSONAL RISK FACTORS, NOT ELSEWHERE CLASSIFIED: ICD-10-CM

## 2019-06-19 DIAGNOSIS — I50.20 ACC/AHA STAGE C SYSTOLIC HEART FAILURE (HCC): ICD-10-CM

## 2019-06-19 DIAGNOSIS — I50.9 CONGESTIVE HEART FAILURE, NYHA CLASS 2 AND ACC/AHA STAGE C (HCC): ICD-10-CM

## 2019-06-19 LAB
LV EJECT FRACT  99904: 50
LV EJECT FRACT MOD 2C 99903: 41.8
LV EJECT FRACT MOD 4C 99902: 36.74
LV EJECT FRACT MOD BP 99901: 39.81

## 2019-06-19 PROCEDURE — 93306 TTE W/DOPPLER COMPLETE: CPT

## 2019-06-19 PROCEDURE — 99214 OFFICE O/P EST MOD 30 MIN: CPT | Performed by: INTERNAL MEDICINE

## 2019-06-19 PROCEDURE — 93306 TTE W/DOPPLER COMPLETE: CPT | Mod: 26 | Performed by: INTERNAL MEDICINE

## 2019-06-19 ASSESSMENT — MINNESOTA LIVING WITH HEART FAILURE QUESTIONNAIRE (MLHF)
DIFFICULTY SLEEPING WELL AT NIGHT: 0
MAKING YOU WORRY: 2
DIFFICULTY WORKING TO EARN A LIVING: 2
WALKING ABOUT OR CLIMBING STAIRS DIFFICULT: 2
SWELLING IN ANKLES OR LEGS: 0
TOTAL_SCORE: 30
EATING LESS FOODS YOU LIKE: 0
DIFFICULTY SOCIALIZING WITH FAMILY OR FRIENDS: 2
MAKING YOU SHORT OF BREATH: 3
DIFFICULTY TO CONCENTRATE OR REMEMBERING THINGS: 2
TIRED, FATIGUED OR LOW ON ENERGY: 0
LOSS OF SELF CONTROL IN YOUR LIFE: 1
MAKING YOU STAY IN A HOSPITAL: 0
HAVING TO SIT OR LIE DOWN DURING THE DAY: 2
GIVING YOU SIDE EFFECTS FROM TREATMENTS: 1
DIFFICULTY WITH RECREATIONAL PASTIMES, SPORTS, HOBBIES: 2
COSTING YOU MONEY FOR MEDICAL CARE: 5
DIFFICULTY GOING AWAY FROM HOME: 1
FEELING LIKE A BURDEN TO FAMILY AND FRIENDS: 3
WORKING AROUND THE HOUSE OR YARD DIFFICULT: 1
MAKING YOU FEEL DEPRESSED: 1
DIFFICULTY WITH SEXUAL ACTIVITIES: 0

## 2019-06-19 ASSESSMENT — 6 MINUTE WALK TEST (6MWT): TOTAL DISTANCE WALKED (METERS): 359

## 2019-06-19 NOTE — LETTER
[facility]   [address]  [place] [address]  Phone: [phone]  Fax: [phone]              Ryan Small  1960    Encounter Date: 6/19/2019    Michel Gonzalez M.D.          PROGRESS NOTE:  Chief Complaint   Patient presents with   • CHF (Systolic)       Subjective:   Ryan Small is a 58 y.o. male who presents today for follow-up post admission for massive bilateral pulmonary emboli and recurrent cardiomyopathy with an ejection fraction of approximately 20% in 2/2018.  His ejection fraction improved to approximately 50%.    He is exercising regularly without difficulty.  He is walking a mile and 1/2 to 2 miles on a daily basis without difficulty.  He denies any chest discomfort, dyspnea, edema, palpitations or lightheadedness.    Past Medical History:   Diagnosis Date   • Blood clotting disorder (HCC)     lung clot 2/2018   • Cold 12/19/2018 12/18/18   • Congestive heart failure (HCC)    • CVA (cerebral vascular accident) (HCC)     left sided weakness   • DM II (diabetes mellitus, type II), controlled (HCC)    • TIA (transient ischemic attack)      Past Surgical History:   Procedure Laterality Date   • COLONOSCOPY  12/20/2018    Procedure: COLONOSCOPY;  Surgeon: Varun Thomas M.D.;  Location: SURGERY [facility];  Service: Gastroenterology   • TONSILLECTOMY       History reviewed. No pertinent family history.  Social History     Social History   • Marital status:      Spouse name: N/A   • Number of children: N/A   • Years of education: N/A     Occupational History   • Not on file.     Social History Main Topics   • Smoking status: Former Smoker     Packs/day: 2.00     Years: 5.00     Quit date: 1/1/2016   • Smokeless tobacco: Never Used   • Alcohol use Yes      Comment: 2 per week   • Drug use: No   • Sexual activity: Yes     Partners: Female     Other Topics Concern   • Not on file     Social History Narrative   "  • No narrative on file     No Known Allergies  Outpatient Encounter Prescriptions as of 6/19/2019   Medication Sig Dispense Refill   • furosemide (LASIX) 20 MG Tab Take 1 Tab by mouth 1 time daily as needed. 90 Tab 0   • lisinopril (PRINIVIL) 10 MG Tab TAKE 1 TABLET BY MOUTH ONCE DAILY 90 Tab 3   • spironolactone (ALDACTONE) 25 MG Tab Take 1 Tab by mouth every day. 30 Tab 11   • metformin (GLUCOPHAGE) 1000 MG tablet Take 1 Tab by mouth 2 times a day, with meals. 180 Tab 2   • metoprolol SR (TOPROL XL) 100 MG TABLET SR 24 HR Take 1 Tab by mouth every evening. 90 Tab 3   • rosuvastatin (CRESTOR) 20 MG Tab Take 1 Tab by mouth every evening. 30 Tab 11   • dabigatran (PRADAXA) 150 MG Cap capsule Take 150 mg by mouth 2 Times a Day.       No facility-administered encounter medications on file as of 6/19/2019.      ROS       Objective:   /74 (BP Location: Left arm, Patient Position: Sitting, BP Cuff Size: Adult)   Pulse 72   Ht 1.778 m (5' 10\")   Wt 86.5 kg (190 lb 12.8 oz)   SpO2 96%   BMI 27.38 kg/m²      Physical Exam   Constitutional: He appears well-developed and well-nourished.   Neck: No JVD present.   Cardiovascular: Normal rate and regular rhythm.    No murmur heard.  Pulmonary/Chest: Effort normal and breath sounds normal. He has no rales.   Abdominal: Soft. There is no tenderness.   Musculoskeletal: He exhibits no edema.     Lab Results   Component Value Date/Time    CHOLSTRLTOT 118 12/06/2018 12:16 PM    LDL 54 12/06/2018 12:16 PM    HDL 43 12/06/2018 12:16 PM    TRIGLYCERIDE 107 12/06/2018 12:16 PM       Lab Results   Component Value Date/Time    SODIUM 137 12/06/2018 12:16 PM    POTASSIUM 4.8 12/06/2018 12:16 PM    CHLORIDE 103 12/06/2018 12:16 PM    CO2 26 12/06/2018 12:16 PM    GLUCOSE 150 (H) 12/06/2018 12:16 PM    BUN 23 (H) 12/06/2018 12:16 PM    CREATININE 0.82 12/06/2018 12:16 PM     Lab Results   Component Value Date/Time    ALKPHOSPHAT 49 12/06/2018 12:16 PM    ASTSGOT 15 12/06/2018 12:16 " PM    ALTSGPT 21 12/06/2018 12:16 PM    TBILIRUBIN 0.5 12/06/2018 12:16 PM      Lab Results   Component Value Date/Time    BNPBTYPENAT 327 (H) 02/14/2018 12:45 PM      Myocardial Perfusion   Report   NUCLEAR IMAGING INTERPRETATION   No reversible defects that would indicate ischemia.    Non-reversible defect noted.    Severely reduced left ventricular systolic function.      JUNAID NAVARRETE     MRN:    [id number]         Gender:    M     Exam Date: 02/17/2018 08:42    Transthoracic  Echo Report  CONCLUSIONS  Mildly reduced left ventricular systolic function.  Left ventricular ejection fraction is visually estimated to be 48%.  Aortic sclerosis without stenosis.  Unable to estimate pulmonary artery pressure due to an inadequate   tricuspid regurgitant jet.    Exam Date:         06/01/2018    Echocardiography Laboratory    CONCLUSIONS  The left ventricle was normal in size and thickness. Mildly reduced   left ventricular systolic function. Inferior and inferolateral   hypokinesis. Left ventricular ejection fraction is visually estimated   to be 50%. Grade I diastolic dysfunction.  Compared to the report of the study done 6/2018- there has been a   slight improvement in LVEF.     JUNAID NAVARRETE  Exam Date:         06/19/2019         Assessment:     1. (HCC) Congestive heart failure, NYHA class 2 and ACC/AHA stage C     2. Abnormal cardiac function test: Echocardiogram showing inferior wall motion abnormality         Medical Decision Making:  Today's Assessment / Status / Plan:     Mr. Navarrete is clinically stable.  His nuclear scan from February 2018 showed no perfusion abnormality.  However, 2 subsequent echocardiograms of shown an inferior and inferior lateral wall motion abnormality.  I feel he should be further evaluated with a myocardial perfusion scan.  His lipid status is been under good control.  He will follow-up in a few months after that study.      Andre aFlk M.D.  [address]  [address]  [place]  [address]  VIA In Basket

## 2019-06-19 NOTE — PROGRESS NOTES
Chief Complaint   Patient presents with   • CHF (Systolic)       Subjective:   Ryan Small is a 58 y.o. male who presents today for follow-up post admission for massive bilateral pulmonary emboli and recurrent cardiomyopathy with an ejection fraction of approximately 20% in 2/2018.  His ejection fraction improved to approximately 50%.    He is exercising regularly without difficulty.  He is walking a mile and 1/2 to 2 miles on a daily basis without difficulty.  He denies any chest discomfort, dyspnea, edema, palpitations or lightheadedness.    Past Medical History:   Diagnosis Date   • Blood clotting disorder (HCC)     lung clot 2/2018   • Cold 12/19/2018 12/18/18   • Congestive heart failure (HCC)    • CVA (cerebral vascular accident) (HCC)     left sided weakness   • DM II (diabetes mellitus, type II), controlled (HCC)    • TIA (transient ischemic attack)      Past Surgical History:   Procedure Laterality Date   • COLONOSCOPY  12/20/2018    Procedure: COLONOSCOPY;  Surgeon: Varun Thomas M.D.;  Location: SURGERY [facility];  Service: Gastroenterology   • TONSILLECTOMY       History reviewed. No pertinent family history.  Social History     Social History   • Marital status:      Spouse name: N/A   • Number of children: N/A   • Years of education: N/A     Occupational History   • Not on file.     Social History Main Topics   • Smoking status: Former Smoker     Packs/day: 2.00     Years: 5.00     Quit date: 1/1/2016   • Smokeless tobacco: Never Used   • Alcohol use Yes      Comment: 2 per week   • Drug use: No   • Sexual activity: Yes     Partners: Female     Other Topics Concern   • Not on file     Social History Narrative   • No narrative on file     No Known Allergies  Outpatient Encounter Prescriptions as of 6/19/2019   Medication Sig Dispense Refill   • furosemide (LASIX) 20 MG Tab Take 1 Tab by mouth 1 time daily as needed. 90 Tab 0   • lisinopril (PRINIVIL) 10 MG Tab TAKE 1 TABLET  "BY MOUTH ONCE DAILY 90 Tab 3   • spironolactone (ALDACTONE) 25 MG Tab Take 1 Tab by mouth every day. 30 Tab 11   • metformin (GLUCOPHAGE) 1000 MG tablet Take 1 Tab by mouth 2 times a day, with meals. 180 Tab 2   • metoprolol SR (TOPROL XL) 100 MG TABLET SR 24 HR Take 1 Tab by mouth every evening. 90 Tab 3   • rosuvastatin (CRESTOR) 20 MG Tab Take 1 Tab by mouth every evening. 30 Tab 11   • dabigatran (PRADAXA) 150 MG Cap capsule Take 150 mg by mouth 2 Times a Day.       No facility-administered encounter medications on file as of 6/19/2019.      ROS       Objective:   /74 (BP Location: Left arm, Patient Position: Sitting, BP Cuff Size: Adult)   Pulse 72   Ht 1.778 m (5' 10\")   Wt 86.5 kg (190 lb 12.8 oz)   SpO2 96%   BMI 27.38 kg/m²     Physical Exam   Constitutional: He appears well-developed and well-nourished.   Neck: No JVD present.   Cardiovascular: Normal rate and regular rhythm.    No murmur heard.  Pulmonary/Chest: Effort normal and breath sounds normal. He has no rales.   Abdominal: Soft. There is no tenderness.   Musculoskeletal: He exhibits no edema.     Lab Results   Component Value Date/Time    CHOLSTRLTOT 118 12/06/2018 12:16 PM    LDL 54 12/06/2018 12:16 PM    HDL 43 12/06/2018 12:16 PM    TRIGLYCERIDE 107 12/06/2018 12:16 PM       Lab Results   Component Value Date/Time    SODIUM 137 12/06/2018 12:16 PM    POTASSIUM 4.8 12/06/2018 12:16 PM    CHLORIDE 103 12/06/2018 12:16 PM    CO2 26 12/06/2018 12:16 PM    GLUCOSE 150 (H) 12/06/2018 12:16 PM    BUN 23 (H) 12/06/2018 12:16 PM    CREATININE 0.82 12/06/2018 12:16 PM     Lab Results   Component Value Date/Time    ALKPHOSPHAT 49 12/06/2018 12:16 PM    ASTSGOT 15 12/06/2018 12:16 PM    ALTSGPT 21 12/06/2018 12:16 PM    TBILIRUBIN 0.5 12/06/2018 12:16 PM      Lab Results   Component Value Date/Time    BNPBTYPENAT 327 (H) 02/14/2018 12:45 PM      Myocardial Perfusion   Report   NUCLEAR IMAGING INTERPRETATION   No reversible defects that would " indicate ischemia.    Non-reversible defect noted.    Severely reduced left ventricular systolic function.      JUNAID NAVARRETE     MRN:    [id number]         Gender:    M     Exam Date: 02/17/2018 08:42    Transthoracic  Echo Report  CONCLUSIONS  Mildly reduced left ventricular systolic function.  Left ventricular ejection fraction is visually estimated to be 48%.  Aortic sclerosis without stenosis.  Unable to estimate pulmonary artery pressure due to an inadequate   tricuspid regurgitant jet.    Exam Date:         06/01/2018    Echocardiography Laboratory    CONCLUSIONS  The left ventricle was normal in size and thickness. Mildly reduced   left ventricular systolic function. Inferior and inferolateral   hypokinesis. Left ventricular ejection fraction is visually estimated   to be 50%. Grade I diastolic dysfunction.  Compared to the report of the study done 6/2018- there has been a   slight improvement in LVEF.     JUNAID NAVARRETE  Exam Date:         06/19/2019         Assessment:     1. (HCC) Congestive heart failure, NYHA class 2 and ACC/AHA stage C     2. Abnormal cardiac function test: Echocardiogram showing inferior wall motion abnormality         Medical Decision Making:  Today's Assessment / Status / Plan:     Mr. Navarrete is clinically stable.  His nuclear scan from February 2018 showed no perfusion abnormality.  However, 2 subsequent echocardiograms of shown an inferior and inferior lateral wall motion abnormality.  I feel he should be reevaluated with a myocardial perfusion scan.  His lipid status is been under good control.  He will follow-up in a few months after that study.

## 2019-08-14 ENCOUNTER — ANTICOAGULATION VISIT (OUTPATIENT)
Dept: VASCULAR LAB | Facility: MEDICAL CENTER | Age: 59
End: 2019-08-14
Attending: INTERNAL MEDICINE
Payer: MEDICARE

## 2019-08-14 ENCOUNTER — HOSPITAL ENCOUNTER (OUTPATIENT)
Dept: LAB | Facility: MEDICAL CENTER | Age: 59
End: 2019-08-14
Attending: NURSE PRACTITIONER
Payer: MEDICARE

## 2019-08-14 ENCOUNTER — HOSPITAL ENCOUNTER (OUTPATIENT)
Dept: RADIOLOGY | Facility: MEDICAL CENTER | Age: 59
End: 2019-08-14
Attending: INTERNAL MEDICINE
Payer: MEDICARE

## 2019-08-14 VITALS — SYSTOLIC BLOOD PRESSURE: 138 MMHG | HEART RATE: 89 BPM | DIASTOLIC BLOOD PRESSURE: 84 MMHG

## 2019-08-14 DIAGNOSIS — I50.9 CONGESTIVE HEART FAILURE, NYHA CLASS 2 AND ACC/AHA STAGE C (HCC): ICD-10-CM

## 2019-08-14 DIAGNOSIS — R94.30 ABNORMAL CARDIAC FUNCTION TEST: ICD-10-CM

## 2019-08-14 DIAGNOSIS — I44.0 FIRST DEGREE AV BLOCK: ICD-10-CM

## 2019-08-14 DIAGNOSIS — Z91.89 OTHER SPECIFIED PERSONAL RISK FACTORS, NOT ELSEWHERE CLASSIFIED: ICD-10-CM

## 2019-08-14 DIAGNOSIS — I82.5Y2 CHRONIC DEEP VEIN THROMBOSIS (DVT) OF PROXIMAL VEIN OF LEFT LOWER EXTREMITY (HCC): ICD-10-CM

## 2019-08-14 DIAGNOSIS — Z86.711 HISTORY OF PULMONARY EMBOLISM: ICD-10-CM

## 2019-08-14 LAB
ANION GAP SERPL CALC-SCNC: 8 MMOL/L (ref 0–11.9)
BUN SERPL-MCNC: 20 MG/DL (ref 8–22)
CALCIUM SERPL-MCNC: 9.5 MG/DL (ref 8.5–10.5)
CHLORIDE SERPL-SCNC: 106 MMOL/L (ref 96–112)
CO2 SERPL-SCNC: 25 MMOL/L (ref 20–33)
CREAT SERPL-MCNC: 0.83 MG/DL (ref 0.5–1.4)
ERYTHROCYTE [DISTWIDTH] IN BLOOD BY AUTOMATED COUNT: 48.5 FL (ref 35.9–50)
GLUCOSE SERPL-MCNC: 200 MG/DL (ref 65–99)
HCT VFR BLD AUTO: 45.8 % (ref 42–52)
HGB BLD-MCNC: 14.6 G/DL (ref 14–18)
MCH RBC QN AUTO: 30.8 PG (ref 27–33)
MCHC RBC AUTO-ENTMCNC: 31.9 G/DL (ref 33.7–35.3)
MCV RBC AUTO: 96.6 FL (ref 81.4–97.8)
PLATELET # BLD AUTO: 301 K/UL (ref 164–446)
PMV BLD AUTO: 10.3 FL (ref 9–12.9)
POTASSIUM SERPL-SCNC: 4.9 MMOL/L (ref 3.6–5.5)
RBC # BLD AUTO: 4.74 M/UL (ref 4.7–6.1)
SODIUM SERPL-SCNC: 139 MMOL/L (ref 135–145)
WBC # BLD AUTO: 6.6 K/UL (ref 4.8–10.8)

## 2019-08-14 PROCEDURE — 85027 COMPLETE CBC AUTOMATED: CPT

## 2019-08-14 PROCEDURE — 93018 CV STRESS TEST I&R ONLY: CPT | Performed by: INTERNAL MEDICINE

## 2019-08-14 PROCEDURE — A9502 TC99M TETROFOSMIN: HCPCS

## 2019-08-14 PROCEDURE — 80048 BASIC METABOLIC PNL TOTAL CA: CPT

## 2019-08-14 PROCEDURE — 99212 OFFICE O/P EST SF 10 MIN: CPT | Mod: 25 | Performed by: NURSE PRACTITIONER

## 2019-08-14 PROCEDURE — 78452 HT MUSCLE IMAGE SPECT MULT: CPT | Mod: 26 | Performed by: INTERNAL MEDICINE

## 2019-08-14 PROCEDURE — 36415 COLL VENOUS BLD VENIPUNCTURE: CPT

## 2019-08-14 NOTE — PROGRESS NOTES
Diagnosis: DVT, PE hx, CVA hx 2015-16  Drug: Eliquis 5 mg BID  LOT: Indefinite  CHADSVASC: n/a  HAS-BLED: 0    Health Status Since Last Assessment  Switched from Pradaxa to Eliquis at the end of last year. Tolerating Eliquis without problems. Copay is expensive but he can affod.    Adherence with DOAC Therapy  0 missed dose(s)  BLEEDING RISK ASSESSMENT NB:    Bleeding Risk Assessment  Severe epistaxis - no   Hemoptysis - no  Excessive or unusual bruising / hematomas - no  GIB/melena/BRBPR/hematemesis - no  Hematuria - no   Abnormal vaginal bleeding - n/a  Concerning daily headache or subdural hematoma symptoms - no  Decreasing hemoglobin or new anemia - no  Falls, presyncope, syncope, or seizures - no  Platelets: 358 - needs recent labs  Latest hemoglobin:     Lab Results   Component Value Date/Time    WBC 9.2 06/01/2018 09:37 AM    RBC 4.37 (L) 06/01/2018 09:37 AM    HEMOGLOBIN 13.4 (L) 06/01/2018 09:37 AM    HEMATOCRIT 40.2 (L) 06/01/2018 09:37 AM    MCV 92.0 06/01/2018 09:37 AM    MCH 30.7 06/01/2018 09:37 AM    MCHC 33.3 (L) 06/01/2018 09:37 AM    MPV 10.1 06/01/2018 09:37 AM    NEUTSPOLYS 53.00 06/01/2018 09:37 AM    LYMPHOCYTES 29.90 06/01/2018 09:37 AM    MONOCYTES 9.30 06/01/2018 09:37 AM    EOSINOPHILS 6.00 06/01/2018 09:37 AM    BASOPHILS 1.00 06/01/2018 09:37 AM        HAS-BLED:  Hypertension (uncontrolled, >160 mmHg systolic) - no  Renal disease (dialysis, transplant, Cr >2.26 mg/dL or >200 µmol/L) - no  Liver disease (cirrhosis or bilirubin >2x normal with AST/ALT/AP >3x normal) - no  Stroke history -yes  Prior major bleeding or predisposition to bleeding - no  Labile INR Unstable/high INRs, time in therapeutic range <60% - no  Age >65 - no  Medication usage predisposing to bleeding (aspirin, clopidogrel, NSAIDs) - no  Alcohol use  ?8 drinks/week - no      Creatinine Clearance/Renal Function  Latest eGFR / creatinine:  Lab Results   Component Value Date/Time    SODIUM 137 12/06/2018 12:16 PM     POTASSIUM 4.8 12/06/2018 12:16 PM    CHLORIDE 103 12/06/2018 12:16 PM    CO2 26 12/06/2018 12:16 PM    GLUCOSE 150 (H) 12/06/2018 12:16 PM    BUN 23 (H) 12/06/2018 12:16 PM    CREATININE 0.82 12/06/2018 12:16 PM      • Is eGFR less than 50ml/min  - no  Needs labs    If YES, calculate CrCl (see back)  Any recent dehydrating illness or medications added/changed? i.e. Diuretics      Drug Interactions  ASA/antiplatelets - avoids  NSAID - avoids  Other drug interactions - Med rec updated, pradaxa removed, Eliquis added  (Review med list / OTCs;)  Current Outpatient Medications on File Prior to Visit   Medication Sig Dispense Refill   • furosemide (LASIX) 20 MG Tab Take 1 Tab by mouth 1 time daily as needed. 90 Tab 0   • lisinopril (PRINIVIL) 10 MG Tab TAKE 1 TABLET BY MOUTH ONCE DAILY 90 Tab 3   • dabigatran (PRADAXA) 150 MG Cap capsule Take 150 mg by mouth 2 Times a Day.     • spironolactone (ALDACTONE) 25 MG Tab Take 1 Tab by mouth every day. 30 Tab 11   • metformin (GLUCOPHAGE) 1000 MG tablet Take 1 Tab by mouth 2 times a day, with meals. 180 Tab 2   • metoprolol SR (TOPROL XL) 100 MG TABLET SR 24 HR Take 1 Tab by mouth every evening. 90 Tab 3   • rosuvastatin (CRESTOR) 20 MG Tab Take 1 Tab by mouth every evening. 30 Tab 11     No current facility-administered medications on file prior to visit.      Verified no anticonvulsant or azole therapy, education provided for future use.      Examination  Blood pressure:  138/84  • Elevated BP - no (sBP greater than 160 mmHg)  • Symptomatic hypotension - no  Significant gait impairment / imbalance / high risk for falls - no obvious risks    Final Assessment and Recommendations:  Patient appears stable from the anticoagulation standpoint  Benefits of continued DOAC therapy outweigh risks for this patient  Recommend continue current DOAC at same dose    Continue taking Eliquis 5 mg by mouth twice daily  Get labs done this week and again in 6 months    Go to the ER for any  signs/symptoms of prolonged bleeding lasting > 20 minutes without stop or for any shortness of breath or pain with breathing or pain/redness/swelling to any extremity or for any signs of a stroke.  Let all your providers know you take this medication  Avoid NSAIDs and aspirin  Don't stop this medication without permission from your doctor or our clinic   refills before you run out  Try not to miss doses  Let us know of any medication changes    Other Actions:   The rationale for continued DOAC therapy  The potential for minor, major or life-threatening bleeding  Dosing instructions, adherence, risks of non-adherence, handling missed doses  Avoiding OTC ASA & NSAIDs & minimizing EtOH to reduce bleeding risks  Dosing around upcoming procedure / surgery if applicable (see back)    Follow up:  Will follow up with patient in 6 months with labs. I will call pt when I receive labs from this week.     Wednesday, February 12, 2020 at 11:00 am.      Brittany MEDINA    Addendum: Pt had labs done after our visit. H/h stable, plts 301, cr cl 118 ml/min. To note, pt wasn't fasting (glucose 200). Spoke with pt to let him know. Will f/u with labs again in 6 months.

## 2019-09-04 ENCOUNTER — OFFICE VISIT (OUTPATIENT)
Dept: CARDIOLOGY | Facility: MEDICAL CENTER | Age: 59
End: 2019-09-04
Payer: MEDICARE

## 2019-09-04 VITALS
BODY MASS INDEX: 27.29 KG/M2 | WEIGHT: 190.6 LBS | HEIGHT: 70 IN | HEART RATE: 74 BPM | OXYGEN SATURATION: 97 % | SYSTOLIC BLOOD PRESSURE: 130 MMHG | DIASTOLIC BLOOD PRESSURE: 72 MMHG

## 2019-09-04 DIAGNOSIS — Z79.01 CHRONIC ANTICOAGULATION: ICD-10-CM

## 2019-09-04 DIAGNOSIS — I50.20 NYHA CLASS 1 HEART FAILURE WITH REDUCED EJECTION FRACTION (HCC): ICD-10-CM

## 2019-09-04 DIAGNOSIS — I50.20 ACC/AHA STAGE C SYSTOLIC HEART FAILURE (HCC): ICD-10-CM

## 2019-09-04 DIAGNOSIS — Z86.73 H/O: CVA (CEREBROVASCULAR ACCIDENT): ICD-10-CM

## 2019-09-04 PROCEDURE — 99214 OFFICE O/P EST MOD 30 MIN: CPT | Performed by: INTERNAL MEDICINE

## 2019-09-04 ASSESSMENT — MINNESOTA LIVING WITH HEART FAILURE QUESTIONNAIRE (MLHF)
DIFFICULTY SOCIALIZING WITH FAMILY OR FRIENDS: 0
EATING LESS FOODS YOU LIKE: 2
MAKING YOU FEEL DEPRESSED: 0
HAVING TO SIT OR LIE DOWN DURING THE DAY: 0
MAKING YOU WORRY: 0
FEELING LIKE A BURDEN TO FAMILY AND FRIENDS: 0
WALKING ABOUT OR CLIMBING STAIRS DIFFICULT: 1
DIFFICULTY GOING AWAY FROM HOME: 0
LOSS OF SELF CONTROL IN YOUR LIFE: 0
SWELLING IN ANKLES OR LEGS: 0
DIFFICULTY SLEEPING WELL AT NIGHT: 0
GIVING YOU SIDE EFFECTS FROM TREATMENTS: 0
DIFFICULTY WORKING TO EARN A LIVING: 0
MAKING YOU STAY IN A HOSPITAL: 0
TIRED, FATIGUED OR LOW ON ENERGY: 0
MAKING YOU SHORT OF BREATH: 0
COSTING YOU MONEY FOR MEDICAL CARE: 2
DIFFICULTY WITH SEXUAL ACTIVITIES: 5
TOTAL_SCORE: 12
DIFFICULTY WITH RECREATIONAL PASTIMES, SPORTS, HOBBIES: 0
DIFFICULTY TO CONCENTRATE OR REMEMBERING THINGS: 1
WORKING AROUND THE HOUSE OR YARD DIFFICULT: 1

## 2019-09-04 NOTE — PROGRESS NOTES
Chief Complaint   Patient presents with   • Congestive Heart Failure     F/v: 3 MO       Subjective:   Ryan Small is a 58 y.o. male who presents today for follow-up post admission for massive bilateral pulmonary emboli and recurrent cardiomyopathy with an ejection fraction of approximately 20% in 2/2018.  His ejection fraction improved to approximately 50%.    He is walking about a mile and a half daily for 30 minutes without any dyspnea.  He denies any PND, orthopnea, edema, palpitations or lightheadedness.  He has had no chest discomfort.    Past Medical History:   Diagnosis Date   • Blood clotting disorder (HCC)     lung clot 2/2018   • Cold 12/19/2018 12/18/18   • Congestive heart failure (HCC)    • CVA (cerebral vascular accident) (HCC)     left sided weakness   • DM II (diabetes mellitus, type II), controlled (HCC)    • TIA (transient ischemic attack)      Past Surgical History:   Procedure Laterality Date   • COLONOSCOPY  12/20/2018    Procedure: COLONOSCOPY;  Surgeon: Varun Thomas M.D.;  Location: SURGERY [facility];  Service: Gastroenterology   • TONSILLECTOMY       History reviewed. No pertinent family history.  Social History     Socioeconomic History   • Marital status:      Spouse name: Not on file   • Number of children: Not on file   • Years of education: Not on file   • Highest education level: Not on file   Occupational History   • Not on file   Social Needs   • Financial resource strain: Not on file   • Food insecurity:     Worry: Not on file     Inability: Not on file   • Transportation needs:     Medical: Not on file     Non-medical: Not on file   Tobacco Use   • Smoking status: Former Smoker     Packs/day: 2.00     Years: 5.00     Pack years: 10.00     Last attempt to quit: 1/1/2016     Years since quitting: 3.6   • Smokeless tobacco: Never Used   Substance and Sexual Activity   • Alcohol use: Yes     Comment: 2 per week   • Drug use: No   • Sexual activity: Yes     " Partners: Female   Lifestyle   • Physical activity:     Days per week: Not on file     Minutes per session: Not on file   • Stress: Not on file   Relationships   • Social connections:     Talks on phone: Not on file     Gets together: Not on file     Attends [employer] service: Not on file     Active member of club or organization: Not on file     Attends meetings of clubs or organizations: Not on file     Relationship status: Not on file   • Intimate partner violence:     Fear of current or ex partner: Not on file     Emotionally abused: Not on file     Physically abused: Not on file     Forced sexual activity: Not on file   Other Topics Concern   • Not on file   Social History Narrative   • Not on file     No Known Allergies  Outpatient Encounter Medications as of 9/4/2019   Medication Sig Dispense Refill   • apixaban (ELIQUIS) 5mg Tab Take 5 mg by mouth 2 Times a Day.     • furosemide (LASIX) 20 MG Tab Take 1 Tab by mouth 1 time daily as needed. 90 Tab 0   • lisinopril (PRINIVIL) 10 MG Tab TAKE 1 TABLET BY MOUTH ONCE DAILY 90 Tab 3   • spironolactone (ALDACTONE) 25 MG Tab Take 1 Tab by mouth every day. 30 Tab 11   • metformin (GLUCOPHAGE) 1000 MG tablet Take 1 Tab by mouth 2 times a day, with meals. 180 Tab 2   • metoprolol SR (TOPROL XL) 100 MG TABLET SR 24 HR Take 1 Tab by mouth every evening. 90 Tab 3   • rosuvastatin (CRESTOR) 20 MG Tab Take 1 Tab by mouth every evening. 30 Tab 11     No facility-administered encounter medications on file as of 9/4/2019.      ROS       Objective:   /72 (BP Location: Right arm, Patient Position: Sitting, BP Cuff Size: Adult)   Pulse 74   Ht 1.778 m (5' 10\")   Wt 86.5 kg (190 lb 9.6 oz)   SpO2 97%   BMI 27.35 kg/m²      Physical Exam   Constitutional: He appears well-developed and well-nourished.   Neck: No JVD present.   Cardiovascular: Normal rate and regular rhythm.   No murmur heard.  Pulmonary/Chest: Effort normal and breath sounds normal. He has no rales. "   Abdominal: Soft. There is no tenderness.   Musculoskeletal: He exhibits no edema.     Lab Results   Component Value Date/Time    CHOLSTRLTOT 118 12/06/2018 12:16 PM    LDL 54 12/06/2018 12:16 PM    HDL 43 12/06/2018 12:16 PM    TRIGLYCERIDE 107 12/06/2018 12:16 PM       Lab Results   Component Value Date/Time    SODIUM 139 08/14/2019 11:28 AM    POTASSIUM 4.9 08/14/2019 11:28 AM    CHLORIDE 106 08/14/2019 11:28 AM    CO2 25 08/14/2019 11:28 AM    GLUCOSE 200 (H) 08/14/2019 11:28 AM    BUN 20 08/14/2019 11:28 AM    CREATININE 0.83 08/14/2019 11:28 AM     Lab Results   Component Value Date/Time    ALKPHOSPHAT 49 12/06/2018 12:16 PM    ASTSGOT 15 12/06/2018 12:16 PM    ALTSGPT 21 12/06/2018 12:16 PM    TBILIRUBIN 0.5 12/06/2018 12:16 PM      Lab Results   Component Value Date/Time    BNPBTYPENAT 327 (H) 02/14/2018 12:45 PM      Myocardial Perfusion   Report   NUCLEAR IMAGING INTERPRETATION   No reversible defects that would indicate ischemia.    Non-reversible defect noted.    Severely reduced left ventricular systolic function.      JUNAID NAVARRETE     MRN:    [id number]         Gender:    M     Exam Date: 02/17/2018 08:42    Transthoracic  Echo Report  CONCLUSIONS  Mildly reduced left ventricular systolic function.  Left ventricular ejection fraction is visually estimated to be 48%.  Aortic sclerosis without stenosis.  Unable to estimate pulmonary artery pressure due to an inadequate   tricuspid regurgitant jet.    Exam Date:         06/01/2018    Echocardiography Laboratory    CONCLUSIONS  The left ventricle was normal in size and thickness. Mildly reduced   left ventricular systolic function. Inferior and inferolateral   hypokinesis. Left ventricular ejection fraction is visually estimated   to be 50%. Grade I diastolic dysfunction.  Compared to the report of the study done 6/2018- there has been a   slight improvement in LVEF.     JUNAID NAVARRETE  Exam Date:         06/19/2019     Myocardial Perfusion   Report from  August 2019:   NUCLEAR IMAGING INTERPRETATION   There is a large in size, moderate to high intensity fixed perfusion defect    in the basal to apical inferior and inferolateral walls c/w prior infarct. No      ischemia.   LV appears dilated by volume and volume index.   LV EF estimated to be 41% with hypokinesis of the inferior and inferolateral    walls.   TID absent.    Assessment:     1. ACC/AHA stage C systolic heart failure (HCC)     2. NYHA class 1 heart failure with reduced ejection fraction (HCC)     3. Chronic anticoagulation     4. H/O CVA (cerebrovascular accident)         Medical Decision Making:  Today's Assessment / Status / Plan:     Mr. Small is clinically stable.  He does appear to have had a prior myocardial infarction.  His ejection fraction has been relatively stable.  He has no history of prior myocardial infarction and may have had a paradoxical embolism at the time of this is massive pulmonary emboli.  We will further risk stratify him with a cardiac CT scan for calcium scoring.  He will follow-up in about a month.

## 2019-09-18 ENCOUNTER — APPOINTMENT (OUTPATIENT)
Dept: MEDICAL GROUP | Facility: MEDICAL CENTER | Age: 59
End: 2019-09-18
Payer: MEDICARE

## 2019-10-08 ENCOUNTER — HOSPITAL ENCOUNTER (OUTPATIENT)
Dept: RADIOLOGY | Facility: MEDICAL CENTER | Age: 59
End: 2019-10-08
Attending: INTERNAL MEDICINE
Payer: COMMERCIAL

## 2019-10-08 DIAGNOSIS — I50.20 ACC/AHA STAGE C SYSTOLIC HEART FAILURE (HCC): ICD-10-CM

## 2019-10-08 PROCEDURE — 4410556 CT-CARDIAC SCORING

## 2019-10-09 ENCOUNTER — TELEPHONE (OUTPATIENT)
Dept: CARDIOLOGY | Facility: MEDICAL CENTER | Age: 59
End: 2019-10-09

## 2019-10-09 ENCOUNTER — OFFICE VISIT (OUTPATIENT)
Dept: CARDIOLOGY | Facility: MEDICAL CENTER | Age: 59
End: 2019-10-09
Payer: MEDICARE

## 2019-10-09 VITALS
BODY MASS INDEX: 27.69 KG/M2 | SYSTOLIC BLOOD PRESSURE: 130 MMHG | WEIGHT: 193.4 LBS | DIASTOLIC BLOOD PRESSURE: 88 MMHG | HEART RATE: 70 BPM | OXYGEN SATURATION: 97 % | HEIGHT: 70 IN

## 2019-10-09 DIAGNOSIS — Z79.01 CHRONIC ANTICOAGULATION: ICD-10-CM

## 2019-10-09 DIAGNOSIS — R94.30 ABNORMAL CARDIAC FUNCTION TEST: ICD-10-CM

## 2019-10-09 DIAGNOSIS — I50.20 NYHA CLASS 1 HEART FAILURE WITH REDUCED EJECTION FRACTION (HCC): ICD-10-CM

## 2019-10-09 DIAGNOSIS — I50.20 ACC/AHA STAGE C SYSTOLIC HEART FAILURE (HCC): ICD-10-CM

## 2019-10-09 DIAGNOSIS — Z86.711 HISTORY OF PULMONARY EMBOLISM: ICD-10-CM

## 2019-10-09 PROCEDURE — 99214 OFFICE O/P EST MOD 30 MIN: CPT | Performed by: INTERNAL MEDICINE

## 2019-10-09 NOTE — TELEPHONE ENCOUNTER
Per patients request and due to scheduling for first available cath date into first week of ovember, Patient is scheduled on 11-6-19 for a C w/poss with Dr. Caballero. Patient was told to hold eliquis for 48hrs prior to procedure, hold metformin day of procedure and 48hrs after and to hold lasix am day of procedure.  Patient to check in at 6:00 for a 7:30 procedure. H&P was done on 10-9-19 by Dr. Gonzalez. Pre admit to call patient due to him living out of area.

## 2019-10-09 NOTE — PROGRESS NOTES
Chief Complaint   Patient presents with   • Congestive Heart Failure       Subjective:   Ryan Small is a 59 y.o. male who presents today for follow-up post admission for massive bilateral pulmonary emboli in February 2018.  At that time, he had a cardiomyopathy with an ejection fraction of approximately 20%.  His ejection fraction improved to approximately 50%.    He is walking about a mile and a half daily for 30 minutes without any dyspnea.  He denies any PND, orthopnea, edema, palpitations or lightheadedness.  He has had no chest discomfort.    Past Medical History:   Diagnosis Date   • Blood clotting disorder (HCC)     lung clot 2/2018   • Cold 12/19/2018 12/18/18   • Congestive heart failure (HCC)    • CVA (cerebral vascular accident) (HCC)     left sided weakness   • DM II (diabetes mellitus, type II), controlled (HCC)    • TIA (transient ischemic attack)      Past Surgical History:   Procedure Laterality Date   • COLONOSCOPY  12/20/2018    Procedure: COLONOSCOPY;  Surgeon: Varun Thomas M.D.;  Location: SURGERY [facility];  Service: Gastroenterology   • TONSILLECTOMY       History reviewed. No pertinent family history.  Social History     Socioeconomic History   • Marital status:      Spouse name: Not on file   • Number of children: Not on file   • Years of education: Not on file   • Highest education level: Not on file   Occupational History   • Not on file   Social Needs   • Financial resource strain: Not on file   • Food insecurity:     Worry: Not on file     Inability: Not on file   • Transportation needs:     Medical: Not on file     Non-medical: Not on file   Tobacco Use   • Smoking status: Former Smoker     Packs/day: 2.00     Years: 5.00     Pack years: 10.00     Last attempt to quit: 1/1/2016     Years since quitting: 3.7   • Smokeless tobacco: Never Used   Substance and Sexual Activity   • Alcohol use: Yes     Comment: 2 per week   • Drug use: No   • Sexual activity:  "Yes     Partners: Female   Lifestyle   • Physical activity:     Days per week: Not on file     Minutes per session: Not on file   • Stress: Not on file   Relationships   • Social connections:     Talks on phone: Not on file     Gets together: Not on file     Attends [employer] service: Not on file     Active member of club or organization: Not on file     Attends meetings of clubs or organizations: Not on file     Relationship status: Not on file   • Intimate partner violence:     Fear of current or ex partner: Not on file     Emotionally abused: Not on file     Physically abused: Not on file     Forced sexual activity: Not on file   Other Topics Concern   • Not on file   Social History Narrative   • Not on file     No Known Allergies  Outpatient Encounter Medications as of 10/9/2019   Medication Sig Dispense Refill   • apixaban (ELIQUIS) 5mg Tab Take 5 mg by mouth 2 Times a Day.     • furosemide (LASIX) 20 MG Tab Take 1 Tab by mouth 1 time daily as needed. 90 Tab 0   • lisinopril (PRINIVIL) 10 MG Tab TAKE 1 TABLET BY MOUTH ONCE DAILY 90 Tab 3   • spironolactone (ALDACTONE) 25 MG Tab Take 1 Tab by mouth every day. 30 Tab 11   • metformin (GLUCOPHAGE) 1000 MG tablet Take 1 Tab by mouth 2 times a day, with meals. 180 Tab 2   • metoprolol SR (TOPROL XL) 100 MG TABLET SR 24 HR Take 1 Tab by mouth every evening. 90 Tab 3   • rosuvastatin (CRESTOR) 20 MG Tab Take 1 Tab by mouth every evening. 30 Tab 11     No facility-administered encounter medications on file as of 10/9/2019.      ROS       Objective:   /88 (BP Location: Left arm, Patient Position: Sitting, BP Cuff Size: Adult)   Pulse 70   Ht 1.778 m (5' 10\")   Wt 87.7 kg (193 lb 6.4 oz)   SpO2 97%   BMI 27.75 kg/m²     Physical Exam   Constitutional: He appears well-developed and well-nourished.   Neck: No JVD present.   Cardiovascular: Normal rate and regular rhythm.   No murmur heard.  Pulmonary/Chest: Effort normal and breath sounds normal. He has no " rales.   Abdominal: Soft. There is no tenderness.   Musculoskeletal: He exhibits no edema.     Lab Results   Component Value Date/Time    CHOLSTRLTOT 118 12/06/2018 12:16 PM    LDL 54 12/06/2018 12:16 PM    HDL 43 12/06/2018 12:16 PM    TRIGLYCERIDE 107 12/06/2018 12:16 PM       Lab Results   Component Value Date/Time    SODIUM 139 08/14/2019 11:28 AM    POTASSIUM 4.9 08/14/2019 11:28 AM    CHLORIDE 106 08/14/2019 11:28 AM    CO2 25 08/14/2019 11:28 AM    GLUCOSE 200 (H) 08/14/2019 11:28 AM    BUN 20 08/14/2019 11:28 AM    CREATININE 0.83 08/14/2019 11:28 AM     Lab Results   Component Value Date/Time    ALKPHOSPHAT 49 12/06/2018 12:16 PM    ASTSGOT 15 12/06/2018 12:16 PM    ALTSGPT 21 12/06/2018 12:16 PM    TBILIRUBIN 0.5 12/06/2018 12:16 PM      Lab Results   Component Value Date/Time    BNPBTYPENAT 327 (H) 02/14/2018 12:45 PM      Myocardial Perfusion   Report   NUCLEAR IMAGING INTERPRETATION   No reversible defects that would indicate ischemia.    Non-reversible defect noted.    Severely reduced left ventricular systolic function.      JUNAID NAVARRETE     MRN:    [id number]         Gender:    M     Exam Date: 02/17/2018 08:42    Transthoracic  Echo Report  CONCLUSIONS  Mildly reduced left ventricular systolic function.  Left ventricular ejection fraction is visually estimated to be 48%.  Aortic sclerosis without stenosis.  Unable to estimate pulmonary artery pressure due to an inadequate   tricuspid regurgitant jet.    Exam Date:         06/01/2018    Echocardiography Laboratory    CONCLUSIONS  The left ventricle was normal in size and thickness. Mildly reduced   left ventricular systolic function. Inferior and inferolateral   hypokinesis. Left ventricular ejection fraction is visually estimated   to be 50%. Grade I diastolic dysfunction.  Compared to the report of the study done 6/2018- there has been a   slight improvement in LVEF.     JUNAID NAVARRETE  Exam Date:         06/19/2019     Myocardial Perfusion   Report  from August 2019:   NUCLEAR IMAGING INTERPRETATION   There is a large in size, moderate to high intensity fixed perfusion defect    in the basal to apical inferior and inferolateral walls c/w prior infarct. No      ischemia.   LV appears dilated by volume and volume index.   LV EF estimated to be 41% with hypokinesis of the inferior and inferolateral    walls.   TID absent.    Coronary calcium score from October 8, 2019:  Coronary calcification:  LMA - 0.0  LCX - 50.3  LAD - 249.3  RCA - 348.4  PDA - 0.0    Total Calcium Score: 648.0      Assessment:     1. ACC/AHA stage C systolic heart failure (HCC)     2. NYHA class 1 heart failure with reduced ejection fraction (HCC)     3. Abnormal cardiac function test: Echocardiogram showing inferior wall motion abnormality     4. Chronic anticoagulation     5. H/O pulmonary embolism         Medical Decision Making:  Today's Assessment / Status / Plan:     Mr. Small is clinically stable.  He has had a slight drop in his ejection fraction though it is significantly improved over the ejection fraction in February 2018.  Though his perfusion abnormality on his nuclear scan was fixed, he does have hypokinesis and not akinesis of his inferior and inferior lateral walls.  At this time, I feel we should proceed to cardiac catheterization with revascularization if necessary.  He will follow-up in about a month, after that procedure.

## 2019-10-11 ENCOUNTER — HOSPITAL ENCOUNTER (OUTPATIENT)
Facility: MEDICAL CENTER | Age: 59
End: 2019-10-11
Attending: INTERNAL MEDICINE | Admitting: INTERNAL MEDICINE
Payer: MEDICARE

## 2019-10-15 NOTE — TELEPHONE ENCOUNTER
Per patients request, patient has been rescheduled from 11-6-19-to 11-8-19 for a C w/poss with Dr. Enriquez. Patient to check in at 6:30 for an 8:30 procedure.

## 2019-10-21 SDOH — HEALTH STABILITY: MENTAL HEALTH: HOW OFTEN DO YOU HAVE A DRINK CONTAINING ALCOHOL?: 2-3 TIMES A WEEK

## 2019-11-06 ENCOUNTER — APPOINTMENT (OUTPATIENT)
Dept: CARDIOLOGY | Facility: MEDICAL CENTER | Age: 59
End: 2019-11-06
Attending: INTERNAL MEDICINE
Payer: MEDICARE

## 2019-11-08 ENCOUNTER — APPOINTMENT (OUTPATIENT)
Dept: CARDIOLOGY | Facility: MEDICAL CENTER | Age: 59
End: 2019-11-08
Attending: INTERNAL MEDICINE
Payer: MEDICARE

## 2020-03-12 DIAGNOSIS — Z79.01 CHRONIC ANTICOAGULATION: ICD-10-CM

## 2020-05-21 NOTE — PROGRESS NOTES
Anticoagulation Summary  As of 3/22/2018    INR goal:      TTR:   --   Today's INR:   1.3   Maintenance plan:   No maintenance plan   Next INR check:      Target end date:       Indications    DVT  lower extremity  proximal  acute  left (CMS-HCC) [I82.4Y2]  First degree AV block [I44.0]  History of pulmonary embolism [Z86.711]             Anticoagulation Episode Summary     INR check location:       Preferred lab:       Send INR reminders to:       Comments:         Anticoagulation Care Providers     Provider Role Specialty Phone number    Andre Falk M.D. Referring Family Medicine [phone]    [facility] Responsible  [phone]        Anticoagulation Patient Findings                Target end date:Indefinite     Indication: PE/ recurrent VTE     Drug: Eliquis 5 mg BID     CHADsVASC = n/a    Health Status Since Last Assessment   Patient denies any new relevant medical problems, ED visits or hospitalizations   Patient denies any embolic events (stroke/tia/systemic embolism)    Adherence with DOAC Therapy   Pt has not missed any doses in the average week    Bleeding Risk Assessment     Denies Epistaxis   Pt denies any excessive or unusual bleeding/hematomas.  Pt denies any GI bleeds or hematemesis.  Pt denies any concerning daily headache or sub dural hematoma symptoms.     Pt denies any hematuria or abnormal vaginal bleeding.   Latest Hemoglobin 13.1   ETOH overuse Denies     Creatinine Clearance/Renal Function     Latest ClCr >50 mL/min     Drug Interactions   Platelets: none   ASA/other antiplatelets none   NSAID none   Other drug interactions none   Verified no anticonvulsant or azole therapy, education provided for future use.     Examination   Blood Pressure See vitals.    Symptomatic hypotension Denies   Significant gait impairment/imbalance/high risk for falls? Some gait impairment when walking, but benefit outweighs the risk.     Patient recently admitted for bilateral PE.  Pt  Report received from huy Post. underwent thrombolysis and was started on anticoagulation.  Pt states he had a LLE DVT about 10 years ago and was treated on warfarin for 6-12 months.  It does not sound like this DVT was provoked.  He also developed a CVA, per pt it sounds like he was told it was a cardio embolic source from possibly cardiomyopathy.     Recent PE also presented with LLE DVT.  Pt denies s/s or any provoking factors.    Denies CP or SOB.  Did not confirm if pt has family hx of VTE.  Medications reconciled.  Discussed DOAC vs warfarin.   Eliquis is expensive but is not willing to try warfarin.  He is currently applying for assistance program.  Requested pt to contact the clinic if he does not obtain assistance.  Very resistant to warfarin therapy despite multiple options for him to obtain his INR.  Provided education and answered pt questions.    Final Assessment and Recommendations:   Patient appears stable from the anticoagulation staindpoint.     Benefits of continued DOAC therapy outweigh risks for this patient   Recommend pt continue with current DOAC therapy Eliquis 5 mg PO BID.    Follow up:   Will follow up with patient via clinic in 2 months.    Tae Chavez, PharmD   CC Dr Michael Bloch, Dr Andre Falk

## 2023-01-10 NOTE — PROGRESS NOTES
MS:    SR-ST. HR: . BP: 100-140.    .20/.10/.38    12 hour chart check   PAST SURGICAL HISTORY:  PFO (patent foramen ovale) closed in 2022

## 2024-01-25 ENCOUNTER — APPOINTMENT (OUTPATIENT)
Dept: RADIOLOGY | Facility: MEDICAL CENTER | Age: 64
DRG: 639 | End: 2024-01-25
Attending: STUDENT IN AN ORGANIZED HEALTH CARE EDUCATION/TRAINING PROGRAM
Payer: MEDICARE

## 2024-01-25 ENCOUNTER — HOSPITAL ENCOUNTER (INPATIENT)
Facility: MEDICAL CENTER | Age: 64
LOS: 1 days | DRG: 639 | End: 2024-01-26
Attending: STUDENT IN AN ORGANIZED HEALTH CARE EDUCATION/TRAINING PROGRAM | Admitting: STUDENT IN AN ORGANIZED HEALTH CARE EDUCATION/TRAINING PROGRAM
Payer: MEDICARE

## 2024-01-25 DIAGNOSIS — Z79.01 CHRONIC ANTICOAGULATION: ICD-10-CM

## 2024-01-25 DIAGNOSIS — R73.9 HYPERGLYCEMIA: ICD-10-CM

## 2024-01-25 DIAGNOSIS — M14.60 CHARCOT'S ARTHROPATHY: ICD-10-CM

## 2024-01-25 DIAGNOSIS — I77.79 CELIAC ARTERY DISSECTION (HCC): ICD-10-CM

## 2024-01-25 DIAGNOSIS — E11.621 DIABETIC ULCER OF TOE OF LEFT FOOT ASSOCIATED WITH TYPE 2 DIABETES MELLITUS, UNSPECIFIED ULCER STAGE (HCC): ICD-10-CM

## 2024-01-25 DIAGNOSIS — Z86.73 H/O: CVA (CEREBROVASCULAR ACCIDENT): ICD-10-CM

## 2024-01-25 DIAGNOSIS — M86.9 OSTEOMYELITIS OF TOE OF LEFT FOOT (HCC): ICD-10-CM

## 2024-01-25 DIAGNOSIS — E13.3299: ICD-10-CM

## 2024-01-25 DIAGNOSIS — L97.529 DIABETIC ULCER OF TOE OF LEFT FOOT ASSOCIATED WITH TYPE 2 DIABETES MELLITUS, UNSPECIFIED ULCER STAGE (HCC): ICD-10-CM

## 2024-01-25 DIAGNOSIS — Z86.711 HISTORY OF PULMONARY EMBOLISM: ICD-10-CM

## 2024-01-25 DIAGNOSIS — B35.1 ONYCHOMYCOSIS OF MULTIPLE TOENAILS WITH TYPE 2 DIABETES MELLITUS (HCC): ICD-10-CM

## 2024-01-25 DIAGNOSIS — E11.69 ONYCHOMYCOSIS OF MULTIPLE TOENAILS WITH TYPE 2 DIABETES MELLITUS (HCC): ICD-10-CM

## 2024-01-25 DIAGNOSIS — Z86.718 PERSONAL HISTORY OF DVT (DEEP VEIN THROMBOSIS): ICD-10-CM

## 2024-01-25 LAB
ALBUMIN SERPL BCP-MCNC: 4.7 G/DL (ref 3.2–4.9)
ALBUMIN/GLOB SERPL: 1.2 G/DL
ALP SERPL-CCNC: 78 U/L (ref 30–99)
ALT SERPL-CCNC: 18 U/L (ref 2–50)
ANION GAP SERPL CALC-SCNC: 15 MMOL/L (ref 7–16)
AST SERPL-CCNC: 22 U/L (ref 12–45)
BASOPHILS # BLD AUTO: 1.1 % (ref 0–1.8)
BASOPHILS # BLD: 0.08 K/UL (ref 0–0.12)
BILIRUB SERPL-MCNC: 0.3 MG/DL (ref 0.1–1.5)
BUN SERPL-MCNC: 13 MG/DL (ref 8–22)
CALCIUM ALBUM COR SERPL-MCNC: 9.3 MG/DL (ref 8.5–10.5)
CALCIUM SERPL-MCNC: 9.9 MG/DL (ref 8.5–10.5)
CHLORIDE SERPL-SCNC: 104 MMOL/L (ref 96–112)
CO2 SERPL-SCNC: 22 MMOL/L (ref 20–33)
CREAT SERPL-MCNC: 0.78 MG/DL (ref 0.5–1.4)
CRP SERPL HS-MCNC: <0.3 MG/DL (ref 0–0.75)
EOSINOPHIL # BLD AUTO: 0.47 K/UL (ref 0–0.51)
EOSINOPHIL NFR BLD: 6.2 % (ref 0–6.9)
ERYTHROCYTE [DISTWIDTH] IN BLOOD BY AUTOMATED COUNT: 44.8 FL (ref 35.9–50)
ERYTHROCYTE [SEDIMENTATION RATE] IN BLOOD BY WESTERGREN METHOD: 12 MM/HOUR (ref 0–20)
GFR SERPLBLD CREATININE-BSD FMLA CKD-EPI: 100 ML/MIN/1.73 M 2
GLOBULIN SER CALC-MCNC: 4 G/DL (ref 1.9–3.5)
GLUCOSE BLD STRIP.AUTO-MCNC: 132 MG/DL (ref 65–99)
GLUCOSE BLD STRIP.AUTO-MCNC: 134 MG/DL (ref 65–99)
GLUCOSE SERPL-MCNC: 142 MG/DL (ref 65–99)
HCT VFR BLD AUTO: 47.3 % (ref 42–52)
HGB BLD-MCNC: 16.3 G/DL (ref 14–18)
IMM GRANULOCYTES # BLD AUTO: 0.02 K/UL (ref 0–0.11)
IMM GRANULOCYTES NFR BLD AUTO: 0.3 % (ref 0–0.9)
LACTATE SERPL-SCNC: 0.7 MMOL/L (ref 0.5–2)
LYMPHOCYTES # BLD AUTO: 2.48 K/UL (ref 1–4.8)
LYMPHOCYTES NFR BLD: 32.7 % (ref 22–41)
MCH RBC QN AUTO: 31.4 PG (ref 27–33)
MCHC RBC AUTO-ENTMCNC: 34.5 G/DL (ref 32.3–36.5)
MCV RBC AUTO: 91.1 FL (ref 81.4–97.8)
MONOCYTES # BLD AUTO: 0.67 K/UL (ref 0–0.85)
MONOCYTES NFR BLD AUTO: 8.8 % (ref 0–13.4)
NEUTROPHILS # BLD AUTO: 3.86 K/UL (ref 1.82–7.42)
NEUTROPHILS NFR BLD: 50.9 % (ref 44–72)
NRBC # BLD AUTO: 0 K/UL
NRBC BLD-RTO: 0 /100 WBC (ref 0–0.2)
PLATELET # BLD AUTO: 361 K/UL (ref 164–446)
PMV BLD AUTO: 9.4 FL (ref 9–12.9)
POTASSIUM SERPL-SCNC: 4.3 MMOL/L (ref 3.6–5.5)
PROT SERPL-MCNC: 8.7 G/DL (ref 6–8.2)
RBC # BLD AUTO: 5.19 M/UL (ref 4.7–6.1)
SCCMEC + MECA PNL NOSE NAA+PROBE: NEGATIVE
SODIUM SERPL-SCNC: 141 MMOL/L (ref 135–145)
WBC # BLD AUTO: 7.6 K/UL (ref 4.8–10.8)

## 2024-01-25 PROCEDURE — 770001 HCHG ROOM/CARE - MED/SURG/GYN PRIV*

## 2024-01-25 PROCEDURE — 82962 GLUCOSE BLOOD TEST: CPT

## 2024-01-25 PROCEDURE — 700111 HCHG RX REV CODE 636 W/ 250 OVERRIDE (IP): Mod: JZ | Performed by: STUDENT IN AN ORGANIZED HEALTH CARE EDUCATION/TRAINING PROGRAM

## 2024-01-25 PROCEDURE — 86140 C-REACTIVE PROTEIN: CPT

## 2024-01-25 PROCEDURE — 80053 COMPREHEN METABOLIC PANEL: CPT

## 2024-01-25 PROCEDURE — 700105 HCHG RX REV CODE 258: Performed by: STUDENT IN AN ORGANIZED HEALTH CARE EDUCATION/TRAINING PROGRAM

## 2024-01-25 PROCEDURE — 99222 1ST HOSP IP/OBS MODERATE 55: CPT | Mod: AI,GC | Performed by: STUDENT IN AN ORGANIZED HEALTH CARE EDUCATION/TRAINING PROGRAM

## 2024-01-25 PROCEDURE — 700102 HCHG RX REV CODE 250 W/ 637 OVERRIDE(OP): Performed by: STUDENT IN AN ORGANIZED HEALTH CARE EDUCATION/TRAINING PROGRAM

## 2024-01-25 PROCEDURE — 85025 COMPLETE CBC W/AUTO DIFF WBC: CPT

## 2024-01-25 PROCEDURE — 36415 COLL VENOUS BLD VENIPUNCTURE: CPT

## 2024-01-25 PROCEDURE — 96365 THER/PROPH/DIAG IV INF INIT: CPT

## 2024-01-25 PROCEDURE — 85652 RBC SED RATE AUTOMATED: CPT

## 2024-01-25 PROCEDURE — 83605 ASSAY OF LACTIC ACID: CPT

## 2024-01-25 PROCEDURE — 99285 EMERGENCY DEPT VISIT HI MDM: CPT

## 2024-01-25 PROCEDURE — 73630 X-RAY EXAM OF FOOT: CPT | Mod: LT

## 2024-01-25 PROCEDURE — 87040 BLOOD CULTURE FOR BACTERIA: CPT | Mod: 91

## 2024-01-25 PROCEDURE — 96375 TX/PRO/DX INJ NEW DRUG ADDON: CPT

## 2024-01-25 PROCEDURE — 87641 MR-STAPH DNA AMP PROBE: CPT

## 2024-01-25 RX ORDER — BISACODYL 10 MG
10 SUPPOSITORY, RECTAL RECTAL
Status: DISCONTINUED | OUTPATIENT
Start: 2024-01-25 | End: 2024-01-26 | Stop reason: HOSPADM

## 2024-01-25 RX ORDER — POLYETHYLENE GLYCOL 3350 17 G/17G
1 POWDER, FOR SOLUTION ORAL
Status: DISCONTINUED | OUTPATIENT
Start: 2024-01-25 | End: 2024-01-26 | Stop reason: HOSPADM

## 2024-01-25 RX ORDER — AMOXICILLIN 250 MG
2 CAPSULE ORAL 2 TIMES DAILY
Status: DISCONTINUED | OUTPATIENT
Start: 2024-01-25 | End: 2024-01-26 | Stop reason: HOSPADM

## 2024-01-25 RX ORDER — DEXTROSE MONOHYDRATE 25 G/50ML
25 INJECTION, SOLUTION INTRAVENOUS
Status: DISCONTINUED | OUTPATIENT
Start: 2024-01-25 | End: 2024-01-26

## 2024-01-25 RX ORDER — ACETAMINOPHEN 325 MG/1
650 TABLET ORAL EVERY 6 HOURS PRN
Status: DISCONTINUED | OUTPATIENT
Start: 2024-01-25 | End: 2024-01-26 | Stop reason: HOSPADM

## 2024-01-25 RX ORDER — INSULIN LISPRO 100 [IU]/ML
1-6 INJECTION, SOLUTION INTRAVENOUS; SUBCUTANEOUS EVERY 6 HOURS
Status: DISCONTINUED | OUTPATIENT
Start: 2024-01-25 | End: 2024-01-26

## 2024-01-25 RX ORDER — SODIUM CHLORIDE, SODIUM LACTATE, POTASSIUM CHLORIDE, CALCIUM CHLORIDE 600; 310; 30; 20 MG/100ML; MG/100ML; MG/100ML; MG/100ML
1000 INJECTION, SOLUTION INTRAVENOUS CONTINUOUS
Status: DISCONTINUED | OUTPATIENT
Start: 2024-01-25 | End: 2024-01-26 | Stop reason: HOSPADM

## 2024-01-25 RX ADMIN — AMPICILLIN AND SULBACTAM 3 G: 1; 2 INJECTION, POWDER, FOR SOLUTION INTRAMUSCULAR; INTRAVENOUS at 18:45

## 2024-01-25 RX ADMIN — VANCOMYCIN HYDROCHLORIDE 2250 MG: 5 INJECTION, POWDER, LYOPHILIZED, FOR SOLUTION INTRAVENOUS at 20:03

## 2024-01-25 ASSESSMENT — COGNITIVE AND FUNCTIONAL STATUS - GENERAL
SUGGESTED CMS G CODE MODIFIER DAILY ACTIVITY: CH
DAILY ACTIVITIY SCORE: 24
MOBILITY SCORE: 24
SUGGESTED CMS G CODE MODIFIER MOBILITY: CH
MOBILITY SCORE: 24
SUGGESTED CMS G CODE MODIFIER MOBILITY: CH
DAILY ACTIVITIY SCORE: 24
SUGGESTED CMS G CODE MODIFIER DAILY ACTIVITY: CH

## 2024-01-25 ASSESSMENT — ENCOUNTER SYMPTOMS
HEADACHES: 0
NAUSEA: 0
DOUBLE VISION: 0
DIZZINESS: 0
DIARRHEA: 0
DEPRESSION: 0
SORE THROAT: 0
WHEEZING: 0
CHILLS: 0
ABDOMINAL PAIN: 0
FALLS: 0
PALPITATIONS: 0
NERVOUS/ANXIOUS: 0
VOMITING: 0
FEVER: 0
BLURRED VISION: 0
COUGH: 0
WEAKNESS: 0
SHORTNESS OF BREATH: 0
MYALGIAS: 0
CONSTIPATION: 0

## 2024-01-25 ASSESSMENT — LIFESTYLE VARIABLES
HAVE PEOPLE ANNOYED YOU BY CRITICIZING YOUR DRINKING: NO
DOES PATIENT WANT TO STOP DRINKING: NO
ALCOHOL_USE: NO
DOES PATIENT WANT TO TALK TO SOMEONE ABOUT QUITTING: NO
EVER HAD A DRINK FIRST THING IN THE MORNING TO STEADY YOUR NERVES TO GET RID OF A HANGOVER: NO
EVER FELT BAD OR GUILTY ABOUT YOUR DRINKING: NO

## 2024-01-25 ASSESSMENT — FIBROSIS 4 INDEX: FIB4 SCORE: 0.89

## 2024-01-25 ASSESSMENT — PATIENT HEALTH QUESTIONNAIRE - PHQ9
1. LITTLE INTEREST OR PLEASURE IN DOING THINGS: NOT AT ALL
SUM OF ALL RESPONSES TO PHQ9 QUESTIONS 1 AND 2: 0
2. FEELING DOWN, DEPRESSED, IRRITABLE, OR HOPELESS: NOT AT ALL

## 2024-01-26 ENCOUNTER — APPOINTMENT (OUTPATIENT)
Dept: RADIOLOGY | Facility: MEDICAL CENTER | Age: 64
DRG: 639 | End: 2024-01-26
Payer: MEDICARE

## 2024-01-26 ENCOUNTER — PHARMACY VISIT (OUTPATIENT)
Dept: PHARMACY | Facility: MEDICAL CENTER | Age: 64
End: 2024-01-26
Payer: COMMERCIAL

## 2024-01-26 VITALS
HEIGHT: 71 IN | SYSTOLIC BLOOD PRESSURE: 149 MMHG | TEMPERATURE: 97.9 F | HEART RATE: 69 BPM | OXYGEN SATURATION: 96 % | BODY MASS INDEX: 27.65 KG/M2 | RESPIRATION RATE: 18 BRPM | DIASTOLIC BLOOD PRESSURE: 77 MMHG | WEIGHT: 197.53 LBS

## 2024-01-26 LAB
ANION GAP SERPL CALC-SCNC: 8 MMOL/L (ref 7–16)
BASOPHILS # BLD AUTO: 1.1 % (ref 0–1.8)
BASOPHILS # BLD: 0.07 K/UL (ref 0–0.12)
BUN SERPL-MCNC: 11 MG/DL (ref 8–22)
CALCIUM SERPL-MCNC: 8.7 MG/DL (ref 8.5–10.5)
CHLORIDE SERPL-SCNC: 109 MMOL/L (ref 96–112)
CO2 SERPL-SCNC: 24 MMOL/L (ref 20–33)
CREAT SERPL-MCNC: 0.64 MG/DL (ref 0.5–1.4)
EOSINOPHIL # BLD AUTO: 0.45 K/UL (ref 0–0.51)
EOSINOPHIL NFR BLD: 6.8 % (ref 0–6.9)
ERYTHROCYTE [DISTWIDTH] IN BLOOD BY AUTOMATED COUNT: 44.5 FL (ref 35.9–50)
EST. AVERAGE GLUCOSE BLD GHB EST-MCNC: 169 MG/DL
GFR SERPLBLD CREATININE-BSD FMLA CKD-EPI: 107 ML/MIN/1.73 M 2
GLUCOSE BLD STRIP.AUTO-MCNC: 120 MG/DL (ref 65–99)
GLUCOSE SERPL-MCNC: 124 MG/DL (ref 65–99)
HBA1C MFR BLD: 7.5 % (ref 4–5.6)
HCT VFR BLD AUTO: 40.4 % (ref 42–52)
HGB BLD-MCNC: 13.7 G/DL (ref 14–18)
IMM GRANULOCYTES # BLD AUTO: 0.02 K/UL (ref 0–0.11)
IMM GRANULOCYTES NFR BLD AUTO: 0.3 % (ref 0–0.9)
LYMPHOCYTES # BLD AUTO: 2.22 K/UL (ref 1–4.8)
LYMPHOCYTES NFR BLD: 33.3 % (ref 22–41)
MCH RBC QN AUTO: 31 PG (ref 27–33)
MCHC RBC AUTO-ENTMCNC: 33.9 G/DL (ref 32.3–36.5)
MCV RBC AUTO: 91.4 FL (ref 81.4–97.8)
MONOCYTES # BLD AUTO: 0.83 K/UL (ref 0–0.85)
MONOCYTES NFR BLD AUTO: 12.5 % (ref 0–13.4)
NEUTROPHILS # BLD AUTO: 3.07 K/UL (ref 1.82–7.42)
NEUTROPHILS NFR BLD: 46 % (ref 44–72)
NRBC # BLD AUTO: 0 K/UL
NRBC BLD-RTO: 0 /100 WBC (ref 0–0.2)
PLATELET # BLD AUTO: 275 K/UL (ref 164–446)
PMV BLD AUTO: 9.2 FL (ref 9–12.9)
POTASSIUM SERPL-SCNC: 4 MMOL/L (ref 3.6–5.5)
RBC # BLD AUTO: 4.42 M/UL (ref 4.7–6.1)
SODIUM SERPL-SCNC: 141 MMOL/L (ref 135–145)
WBC # BLD AUTO: 6.7 K/UL (ref 4.8–10.8)

## 2024-01-26 PROCEDURE — 700102 HCHG RX REV CODE 250 W/ 637 OVERRIDE(OP)

## 2024-01-26 PROCEDURE — 82962 GLUCOSE BLOOD TEST: CPT

## 2024-01-26 PROCEDURE — 73720 MRI LWR EXTREMITY W/O&W/DYE: CPT | Mod: LT

## 2024-01-26 PROCEDURE — 700105 HCHG RX REV CODE 258: Performed by: STUDENT IN AN ORGANIZED HEALTH CARE EDUCATION/TRAINING PROGRAM

## 2024-01-26 PROCEDURE — 93926 LOWER EXTREMITY STUDY: CPT | Mod: LT

## 2024-01-26 PROCEDURE — 83036 HEMOGLOBIN GLYCOSYLATED A1C: CPT

## 2024-01-26 PROCEDURE — 80048 BASIC METABOLIC PNL TOTAL CA: CPT

## 2024-01-26 PROCEDURE — A9270 NON-COVERED ITEM OR SERVICE: HCPCS

## 2024-01-26 PROCEDURE — 99239 HOSP IP/OBS DSCHRG MGMT >30: CPT | Mod: GC | Performed by: INTERNAL MEDICINE

## 2024-01-26 PROCEDURE — A9579 GAD-BASE MR CONTRAST NOS,1ML: HCPCS | Performed by: STUDENT IN AN ORGANIZED HEALTH CARE EDUCATION/TRAINING PROGRAM

## 2024-01-26 PROCEDURE — 93922 UPR/L XTREMITY ART 2 LEVELS: CPT

## 2024-01-26 PROCEDURE — 700117 HCHG RX CONTRAST REV CODE 255: Performed by: STUDENT IN AN ORGANIZED HEALTH CARE EDUCATION/TRAINING PROGRAM

## 2024-01-26 PROCEDURE — 93922 UPR/L XTREMITY ART 2 LEVELS: CPT | Mod: 26 | Performed by: INTERNAL MEDICINE

## 2024-01-26 PROCEDURE — 85025 COMPLETE CBC W/AUTO DIFF WBC: CPT

## 2024-01-26 PROCEDURE — RXMED WILLOW AMBULATORY MEDICATION CHARGE

## 2024-01-26 PROCEDURE — 93926 LOWER EXTREMITY STUDY: CPT | Mod: 26,LT | Performed by: INTERNAL MEDICINE

## 2024-01-26 RX ORDER — DEXTROSE MONOHYDRATE 25 G/50ML
25 INJECTION, SOLUTION INTRAVENOUS
Status: DISCONTINUED | OUTPATIENT
Start: 2024-01-26 | End: 2024-01-26 | Stop reason: HOSPADM

## 2024-01-26 RX ORDER — AMOXICILLIN AND CLAVULANATE POTASSIUM 875; 125 MG/1; MG/1
1 TABLET, FILM COATED ORAL EVERY 12 HOURS
Qty: 10 TABLET | Refills: 0 | Status: ACTIVE | OUTPATIENT
Start: 2024-01-26 | End: 2024-01-31

## 2024-01-26 RX ORDER — AMOXICILLIN AND CLAVULANATE POTASSIUM 875; 125 MG/1; MG/1
1 TABLET, FILM COATED ORAL EVERY 12 HOURS
Status: DISCONTINUED | OUTPATIENT
Start: 2024-01-26 | End: 2024-01-26 | Stop reason: HOSPADM

## 2024-01-26 RX ORDER — ACETAMINOPHEN 325 MG/1
650 TABLET ORAL EVERY 6 HOURS PRN
Qty: 30 TABLET | Refills: 0 | COMMUNITY
Start: 2024-01-26 | End: 2024-03-06

## 2024-01-26 RX ORDER — INSULIN LISPRO 100 [IU]/ML
1-6 INJECTION, SOLUTION INTRAVENOUS; SUBCUTANEOUS
Status: DISCONTINUED | OUTPATIENT
Start: 2024-01-26 | End: 2024-01-26 | Stop reason: HOSPADM

## 2024-01-26 RX ADMIN — AMOXICILLIN AND CLAVULANATE POTASSIUM 1 TABLET: 875; 125 TABLET, FILM COATED ORAL at 10:55

## 2024-01-26 RX ADMIN — SODIUM CHLORIDE, POTASSIUM CHLORIDE, SODIUM LACTATE AND CALCIUM CHLORIDE 1000 ML: 600; 310; 30; 20 INJECTION, SOLUTION INTRAVENOUS at 01:05

## 2024-01-26 RX ADMIN — GADOTERIDOL 20 ML: 279.3 INJECTION, SOLUTION INTRAVENOUS at 00:43

## 2024-01-26 ASSESSMENT — LIFESTYLE VARIABLES
DOES PATIENT WANT TO STOP DRINKING: NO
DOES PATIENT WANT TO TALK TO SOMEONE ABOUT QUITTING: NO
ALCOHOL_USE: NO
TOTAL SCORE: 0
ON A TYPICAL DAY WHEN YOU DRINK ALCOHOL HOW MANY DRINKS DO YOU HAVE: 0
TOTAL SCORE: 0
TOTAL SCORE: 0
EVER HAD A DRINK FIRST THING IN THE MORNING TO STEADY YOUR NERVES TO GET RID OF A HANGOVER: NO
HAVE YOU EVER FELT YOU SHOULD CUT DOWN ON YOUR DRINKING: NO
CONSUMPTION TOTAL: NEGATIVE
HOW MANY TIMES IN THE PAST YEAR HAVE YOU HAD 5 OR MORE DRINKS IN A DAY: 0
HAVE PEOPLE ANNOYED YOU BY CRITICIZING YOUR DRINKING: NO
AVERAGE NUMBER OF DAYS PER WEEK YOU HAVE A DRINK CONTAINING ALCOHOL: 0
EVER FELT BAD OR GUILTY ABOUT YOUR DRINKING: NO

## 2024-01-26 ASSESSMENT — PAIN DESCRIPTION - PAIN TYPE: TYPE: ACUTE PAIN

## 2024-01-26 ASSESSMENT — FIBROSIS 4 INDEX: FIB4 SCORE: 0.89

## 2024-01-26 NOTE — ASSESSMENT & PLAN NOTE
Wound to distal aspect of second digit, with notable deformity  Xray concerning for possible osteomyelitis  S/p vancomycin and Unasyn in ED  -Admit to medicine  -MRI  -Wound care and limb preservation consult placed  -PT/OT  -Continue vancomycin and Unasyn at this time  -Fall precautions

## 2024-01-26 NOTE — PROGRESS NOTES
4 Eyes Skin Assessment Completed by Janet Suarez , RN and Amirah , RN.    Head WDL  Ears WDL  Nose WDL  Mouth WDL  Neck WDL  Breast/Chest WDL  Shoulder Blades WDL  Spine WDL  (R) Arm/Elbow/Hand WDL  (L) Arm/Elbow/Hand WDL  Abdomen WDL  Groin WDL  Scrotum/Coccyx/Buttocks Redness and Blanching  (R) Leg WDL  (L) Leg Swelling and Edema  (R) Heel/Foot/Toe WDL  (L) Heel/Foot/Toe WDL diabetic left 2nd toe, callus in the 5th left toe           Devices In Places EBONI's      Interventions In Place Pillows and Barrier Cream    Possible Skin Injury No    Pictures Uploaded Into Epic Yes  Wound Consult Placed Yes  RN Wound Prevention Protocol Ordered No

## 2024-01-26 NOTE — H&P
"[facility] Internal Medicine History & Physical Note    Date of Service  1/25/2024    [facility] Team: CHRISTINA   Attending: Júnior Díaz M.d.  Senior Resident: Dr. Panda  Contact Number: [phone]    Primary Care Physician  Pcp Pt States None    Consultants  None    Code Status  DNAR/DNI    Chief Complaint  Chief Complaint   Patient presents with    Digit Pain     Left foot 2nd toe        History of Presenting Illness (HPI):   Ryan Small is a 63 y.o. male who presented 1/25/2024 with wound to left second toe. Past medical history is significant for type 2 diabetes melltius, s/p amputation of left third toe due to  ?history of osteomyelitis (around 2 years ago), history of stroke (2016) with residual left sided deficits, and history of DVT/PE (on Eliquis). He presents today with wife, Kimberly. Patient reports intermittently occurring sore on his left 2nd toe which he relates to friction injury from wearing work shoes. Previously saw a podiatrist at [facility][1] but has not followed up, now with deformity of left second toe after amputation of the third digit. He denies fever, chills, pain in toe, notable pus/drainage or foul smelling odor. He cleaned his foot in a basin and was concerned for infection which prompted him to come to the hospital.   In the ED, he was afebrile, hemodynamically stable and saturating well on ambient air. Labs without leukocytosis, ESR/CRP and lactic acid normal. Blood cultures obtained. Xray of left foot showed \"soft tissue swelling and emphysema\" of the distal second digit, suggestive of possible osteomyelitis. He was treated with vancomycin and Unasyn in the ED. Patient will be admitted for further evaluation and management.     I discussed the plan of care with patient, family, and attending physician, Dr. Díaz .    Review of Systems  Review of Systems   Constitutional:  Negative for chills, fever and malaise/fatigue.   HENT:  Negative for congestion, hearing loss and sore " throat.    Eyes:  Negative for blurred vision and double vision.   Respiratory:  Negative for cough, shortness of breath and wheezing.    Cardiovascular:  Negative for chest pain, palpitations and leg swelling.   Gastrointestinal:  Negative for abdominal pain, constipation, diarrhea, nausea and vomiting.   Genitourinary:  Negative for dysuria.   Musculoskeletal:  Negative for falls and myalgias.   Skin:  Negative for rash.   Neurological:  Negative for dizziness, weakness and headaches.   Psychiatric/Behavioral:  Negative for depression. The patient is not nervous/anxious.        Past Medical History   has a past medical history of Blood clotting disorder ([facility]), Cold (12/19/2018), Congestive heart failure ([facility]), CVA (cerebral vascular accident) ([facility]) (2015), DM II (diabetes mellitus, type II), controlled ([facility]), and TIA (transient ischemic attack).    Surgical History   has a past surgical history that includes tonsillectomy and colonoscopy (12/20/2018).     Family History  family history is not on file.   Family history reviewed with patient.     Social History  Tobacco: Denies  Alcohol: Occasional  Recreational drugs (illegal or prescription): Denies  Employment: Owns restaurant  Living Situation: In [place] with wife  Recent Travel: Denies  Primary Care Provider: Reviewed Dr Cortez at [facility]  Other (stressors, spirituality, exposures): Stressors from work    Allergies  No Known Allergies    Medications  Prior to Admission Medications   Prescriptions Last Dose Informant Patient Reported? Taking?   apixaban (ELIQUIS) 5mg Tab 1/25/2024 at AM Patient Yes No   Sig: Take 5 mg by mouth 2 Times a Day.   metformin (GLUCOPHAGE) 1000 MG tablet 1/25/2024 at AM Patient No No   Sig: Take 1 Tab by mouth 2 times a day, with meals.      Facility-Administered Medications: None       Physical Exam  Temp:  [36.4 °C (97.5 °F)] 36.4 °C (97.5 °F)  Pulse:  [66-99] 66  Resp:  [16-32] 18  BP: (136-164)/(64-94) 153/83  SpO2:  [92  %-95 %] 95 %  Blood Pressure: (!) 160/94   Temperature: 36.4 °C (97.5 °F)   Pulse: 71   Respiration: (!) 32   Pulse Oximetry: 95 %       Physical Exam  Constitutional:       General: He is not in acute distress.     Appearance: Normal appearance. He is not ill-appearing, toxic-appearing or diaphoretic.   HENT:      Head: Normocephalic and atraumatic.      Right Ear: External ear normal.      Left Ear: External ear normal.      Nose: Nose normal. No rhinorrhea.      Mouth/Throat:      Mouth: Mucous membranes are moist.      Pharynx: Oropharynx is clear.   Eyes:      General: No scleral icterus.        Right eye: No discharge.         Left eye: No discharge.      Extraocular Movements: Extraocular movements intact.   Cardiovascular:      Rate and Rhythm: Normal rate and regular rhythm.      Pulses: Normal pulses.      Heart sounds: No murmur heard.  Pulmonary:      Effort: Pulmonary effort is normal. No respiratory distress.      Breath sounds: Normal breath sounds. No wheezing or rhonchi.   Abdominal:      General: There is no distension.      Palpations: Abdomen is soft.      Tenderness: There is no abdominal tenderness. There is no guarding or rebound.   Musculoskeletal:         General: Deformity (of left second digit) present.      Cervical back: Normal range of motion and neck supple.      Comments: Outgrowth noted on left second digit with callus/nail noted on distal aspect. No erythema or drainage, no foul smell   Skin:     General: Skin is warm and dry.   Neurological:      General: No focal deficit present.      Mental Status: He is alert and oriented to person, place, and time. Mental status is at baseline.   Psychiatric:         Mood and Affect: Mood normal.         Behavior: Behavior normal.         Thought Content: Thought content normal.         Judgment: Judgment normal.         Laboratory:  Recent Labs     01/25/24  1820   WBC 7.6   RBC 5.19   HEMOGLOBIN 16.3   HEMATOCRIT 47.3   MCV 91.1   MCH 31.4  "  MCHC 34.5   RDW 44.8   PLATELETCT 361   MPV 9.4     Recent Labs     01/25/24  1820   SODIUM 141   POTASSIUM 4.3   CHLORIDE 104   CO2 22   GLUCOSE 142*   BUN 13   CREATININE 0.78   CALCIUM 9.9     Recent Labs     01/25/24  1820   ALTSGPT 18   ASTSGOT 22   ALKPHOSPHAT 78   TBILIRUBIN 0.3   GLUCOSE 142*         No results for input(s): \"NTPROBNP\" in the last 72 hours.      No results for input(s): \"TROPONINT\" in the last 72 hours.    Imaging:  DX-FOOT-COMPLETE 3+ LEFT   Final Result      1.  Deformity and truncation distal phalanx second digit adjacent to soft tissue emphysema and swelling is noted. Osteomyelitis is a possibility.      2.  Post amputation of third digit.      MR-FOOT-WITH LEFT    (Results Pending)       Xray of L foot reviewed.    Assessment/Plan:  Problem Representation:   I anticipate this patient will require at least two midnights for appropriate medical management, necessitating inpatient admission because possible osteomyelitis, in need of evaluation by surgery    Patient will need a Med/Surg bed on MEDICAL service. The need is secondary to IV antibiotics for possible osteomyelitis, pending evaluation by surgery.    * Osteomyelitis of toe of left foot (HCC)- (present on admission)  Assessment & Plan  Wound to distal aspect of second digit, with notable deformity  Xray concerning for possible osteomyelitis  S/p vancomycin and Unasyn in ED  -Admit to medicine  -MRI with contrast  -Wound care and limb preservation consult placed  -PT/OT  -Fall precautions    H/O pulmonary embolism- (present on admission)  Assessment & Plan  Patient reports history of DVT/PE, previously stopped anticoagulation with recurrence of clots. Denies family history of clotting disorder. Chart review shows h/o celiac artery dissection with associated thrombosis (2014), was supposed to see hematology but unsure if this is done. Not sure if hypercoagulable workup was done.  On Eliquis 5mg twice daily, last dose morning of " 1/25/24  -Continue home Eliquis for now, pending MRI    Type 2 diabetes mellitus (HCC)- (present on admission)  Assessment & Plan  No A1c on file, patient reports last A1c was in October 2023. Only on metformin at home  -Hold home metformin  -ISS  -Hypoglycemia protocol    H/O CVA (cerebrovascular accident)- (present on admission)  Assessment & Plan  In 2016, with left sided residual deficits  -Fall precautions        VTE prophylaxis: SCDs/TEDs

## 2024-01-26 NOTE — CARE PLAN
The patient is Stable - Low risk of patient condition declining or worsening         Progress made toward(s) clinical / shift goals:        Problem: Knowledge Deficit - Standard  Goal: Patient and family/care givers will demonstrate understanding of plan of care, disease process/condition, diagnostic tests and medications  Outcome: Progressing  Note: .Patient updated regarding plan of care and current treatment. All questions answered. Pt voiced understanding.           Problem: Nutrition  Goal: Patient's nutritional and fluid intake will be adequate or improve  Outcome: Progressing  Note: Patient on NPO with sips of water with medication, education provided. On IV fluids continuously. Blood sugar Q 6 while on NPO, within Normal range.

## 2024-01-26 NOTE — ED PROVIDER NOTES
ED Provider Note    CHIEF COMPLAINT  Chief Complaint   Patient presents with    Digit Pain     Left foot 2nd toe        EXTERNAL RECORDS REVIEWED  Outpatient Notes patient was seen in 2019 for congestive heart failure    HPI/ROS  LIMITATION TO HISTORY   Select: : None  OUTSIDE HISTORIAN(S):  None    Ryan Small is a 63 y.o. male with history of type 2 diabetes on metformin, history of PE on Eliquis who presents for evaluation of wound on the distal aspect of the left second toe.  The patient has had the left third toe amputated during a hospitalization and he is unsure if he had osteomyelitis at that time.  This was done at [facility].  Since he had the toe amputated, he has had some deformity of the left second toe and the nail is now on the distal aspect of the toe and has grown all odd.  He did not want to follow-up with podiatry with [facility] therefore he never got revision surgery.  His sock always gets caught on the callus/nail portion of his toe.  He typically just washes his foot in the shower but 2 days ago he noticed that there is pieces of his sock on the callus portion so he decided to clean his foot in a washable.  When he was examining his foot, he noticed a foul odor coming from the area.  He did not see any pus.  He is not having any pain in the toe.  He does not have any fever, chills, nausea, vomiting, chest pain, shortness of breath.  He lives in [place] therefore he was in town today and decided come to the emergency room to see if he get a referral to another orthopedic specialist to have this managed.  He also wanted make sure that there is no infection.    PAST MEDICAL HISTORY   has a past medical history of Blood clotting disorder ([facility]), Cold (12/19/2018), Congestive heart failure ([facility]), CVA (cerebral vascular accident) ([facility]) (2015), DM II (diabetes mellitus, type II), controlled ([facility]), and TIA (transient ischemic attack).    SURGICAL HISTORY   has a past  "surgical history that includes tonsillectomy and colonoscopy (2018).    FAMILY HISTORY  No family history on file.    SOCIAL HISTORY  Social History     Tobacco Use    Smoking status: Former     Current packs/day: 0.00     Average packs/day: 2.0 packs/day for 5.0 years (10.0 ttl pk-yrs)     Types: Cigarettes     Start date: 2011     Quit date: 2016     Years since quittin.0    Smokeless tobacco: Never   Vaping Use    Vaping Use: Never used   Substance and Sexual Activity    Alcohol use: Yes     Comment: 2 per week    Drug use: No    Sexual activity: Yes     Partners: Female       CURRENT MEDICATIONS  Home Medications       Reviewed by Santy Brewer R.N. (Registered Nurse) on 24 at 1620  Med List Status: Partial     Medication Last Dose Status   apixaban (ELIQUIS) 5mg Tab  Active   furosemide (LASIX) 20 MG Tab  Active   lisinopril (PRINIVIL) 10 MG Tab  Active   metformin (GLUCOPHAGE) 1000 MG tablet  Active   metoprolol SR (TOPROL XL) 100 MG TABLET SR 24 HR  Active   rosuvastatin (CRESTOR) 20 MG Tab  Active   spironolactone (ALDACTONE) 25 MG Tab  Active                    ALLERGIES  No Known Allergies    PHYSICAL EXAM  VITAL SIGNS: BP (!) 164/80   Pulse 99   Temp 36.4 °C (97.5 °F) (Temporal)   Resp 16   Ht 1.803 m (5' 11\")   Wt 89 kg (196 lb 3.4 oz)   SpO2 94%   BMI 27.37 kg/m²      Constitutional: Well developed, Well nourished, No acute distress, Non-toxic appearance.   HEENT: Normocephalic, Atraumatic,  external ears normal, pharynx pink,  Mucous  Membranes moist, No rhinorrhea or mucosal edema  Eyes: PERRL, EOMI, Conjunctiva normal, No discharge.   Neck: Normal range of motion, No tenderness, Supple, No stridor.   Cardiovascular: Regular Rate and Rhythm, No murmurs,  rubs, or gallops.   Thorax & Lungs: Lungs clear to auscultation bilaterally, No respiratory distress, No wheezes, rhales or rhonchi, No chest wall tenderness.   Abdomen: Soft, non tender, non distended,  No pulsatile " masses., no rebound guarding or peritoneal signs.   Skin: Warm, Dry, on the distal aspect of the left second toe and there is a overlying callus/nail that I am able to lift up but did not fully pull off, no foul-smelling, no purulent drainage, no significant erythema surrounding this, good capillary refill  Extremities: Equal, intact distal pulses, DP 2+ on left foot, postsurgical deformity of the left second toe where there has been outgrowth from the toe into the space where the third toe used to lie, no tenderness to palpation to the toe  Neurologic: Alert & oriented No focal deficits noted. Reports still has sensation to left foot  Psychiatric: Affect normal, Judgment normal, Mood normal.      DIAGNOSTIC STUDIES / PROCEDURES    LABS  Results for orders placed or performed during the hospital encounter of 01/25/24   Lactic Acid   Result Value Ref Range    Lactic Acid 0.7 0.5 - 2.0 mmol/L   CBC with Differential   Result Value Ref Range    WBC 7.6 4.8 - 10.8 K/uL    RBC 5.19 4.70 - 6.10 M/uL    Hemoglobin 16.3 14.0 - 18.0 g/dL    Hematocrit 47.3 42.0 - 52.0 %    MCV 91.1 81.4 - 97.8 fL    MCH 31.4 27.0 - 33.0 pg    MCHC 34.5 32.3 - 36.5 g/dL    RDW 44.8 35.9 - 50.0 fL    Platelet Count 361 164 - 446 K/uL    MPV 9.4 9.0 - 12.9 fL    Neutrophils-Polys 50.90 44.00 - 72.00 %    Lymphocytes 32.70 22.00 - 41.00 %    Monocytes 8.80 0.00 - 13.40 %    Eosinophils 6.20 0.00 - 6.90 %    Basophils 1.10 0.00 - 1.80 %    Immature Granulocytes 0.30 0.00 - 0.90 %    Nucleated RBC 0.00 0.00 - 0.20 /100 WBC    Neutrophils (Absolute) 3.86 1.82 - 7.42 K/uL    Lymphs (Absolute) 2.48 1.00 - 4.80 K/uL    Monos (Absolute) 0.67 0.00 - 0.85 K/uL    Eos (Absolute) 0.47 0.00 - 0.51 K/uL    Baso (Absolute) 0.08 0.00 - 0.12 K/uL    Immature Granulocytes (abs) 0.02 0.00 - 0.11 K/uL    NRBC (Absolute) 0.00 K/uL   Complete Metabolic Panel   Result Value Ref Range    Sodium 141 135 - 145 mmol/L    Potassium 4.3 3.6 - 5.5 mmol/L    Chloride 104 96  - 112 mmol/L    Co2 22 20 - 33 mmol/L    Anion Gap 15.0 7.0 - 16.0    Glucose 142 (H) 65 - 99 mg/dL    Bun 13 8 - 22 mg/dL    Creatinine 0.78 0.50 - 1.40 mg/dL    Calcium 9.9 8.5 - 10.5 mg/dL    Correct Calcium 9.3 8.5 - 10.5 mg/dL    AST(SGOT) 22 12 - 45 U/L    ALT(SGPT) 18 2 - 50 U/L    Alkaline Phosphatase 78 30 - 99 U/L    Total Bilirubin 0.3 0.1 - 1.5 mg/dL    Albumin 4.7 3.2 - 4.9 g/dL    Total Protein 8.7 (H) 6.0 - 8.2 g/dL    Globulin 4.0 (H) 1.9 - 3.5 g/dL    A-G Ratio 1.2 g/dL   C Reactive Protein Quantitative (Non-Cardiac)   Result Value Ref Range    Stat C-Reactive Protein <0.30 0.00 - 0.75 mg/dL   ESTIMATED GFR   Result Value Ref Range    GFR (CKD-EPI) 100 >60 mL/min/1.73 m 2         RADIOLOGY  I have independently interpreted the diagnostic imaging associated with this visit and am waiting the final reading from the radiologist.   My preliminary interpretation is as follows: no acute fracture  Radiologist interpretation:   DX-FOOT-COMPLETE 3+ LEFT   Final Result      1.  Deformity and truncation distal phalanx second digit adjacent to soft tissue emphysema and swelling is noted. Osteomyelitis is a possibility.      2.  Post amputation of third digit.            COURSE & MEDICAL DECISION MAKING    ED Observation Status? No     7:34 PM I discussed with R Internal Medicine resident who agreed to admit the patient to the hospital.     7:37 PM I discussed with patient and his wife regarding plan for admission and they are agreeable without further questions.     INITIAL ASSESSMENT, COURSE AND PLAN  Care Narrative:   This is a 63-year-old male who has history of type 2 diabetes and has had amputation of the left third toe due to osteomyelitis who is presenting for evaluation of wound to the left second toe and foul odor from the wound that has been ongoing for the past 2 days.  On arrival his vital signs are stable.  He is nontoxic in appearance.  The left foot is neurovascular intact.  He has palpable  pulses.  He has no history of peripheral vascular disease.  The left second toe does have a callus/toenail/wound to the distal aspect and there is no purulent discharge noted.  An x-ray was obtained and there is swelling and soft tissue emphysema with some concern for osteomyelitis on the x-ray.  He has no streaking of erythema up his leg and on exam does not appear to have necrotizing fasciitis.  Labs are obtained and are very reassuring without leukocytosis or elevation in his inflammatory markers.  Given his previous history of osteomyelitis requiring amputation and x-ray findings, he will be admitted to the hospital for MRI to rule out osteomyelitis.  He was given a dose of Unasyn and vancomycin here in the ER.  Discussed results with patient and his wife and plan for admission.  I did discuss with [facility] internal medicine service who agreed to admit the patient to the hospital.  Patient and his wife are agreeable to admission plan with no further questions.        ADDITIONAL PROBLEM LIST  None  DISPOSITION AND DISCUSSIONS  I have discussed management of the patient with the following physicians and IRLANDA's:    [facility] Internal Medicine resident    Discussion of management with other [place] [facility] or appropriate source(s): None     Escalation of care considered, and ultimately not performed:None    Barriers to care at this time, including but not limited to:  None .     Decision tools and prescription drugs considered including, but not limited to: Antibiotics unasyn and Vancomycin .   Patient admitted to [facility] Internal Medicine in guarded condition    FINAL DIAGNOSIS  1. Diabetic ulcer of toe of left foot associated with type 2 diabetes mellitus, unspecified ulcer stage (HCC)    2. Hyperglycemia           Electronically signed by: Barb Stoddard M.D., 1/25/2024 5:20 PM

## 2024-01-26 NOTE — ASSESSMENT & PLAN NOTE
No A1c on file, patient reports last A1c was in October 2023. Only on metformin at home  -Hold home metformin  -ISS  -Hypoglycemia protocol

## 2024-01-26 NOTE — DISCHARGE SUMMARY
[facility] Discharge Summary    Attending: Tyshawn Longoria M.d.  Senior Resident: Dr. Alfred   Intern:  Dr. Carranza  Contact Number: [phone]    CHIEF COMPLAINT ON ADMISSION  Chief Complaint   Patient presents with    Digit Pain     Left foot 2nd toe        Reason for Admission  Left 2nd toe diabetic foot infection with cellulitis     Admission Date  1/25/2024    CODE STATUS  DNAR/DNI    HPI & HOSPITAL COURSE  This is a 63 y.o. 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  Previously was following with podiatrist, however has been lost to follow-up.  Noted to have deformities of bilateral feet with Charcot arthropathy like findings.  Mild surrounding erythema noted at second distal phalange consistent with diabetic foot infection ESR and CRP were WNL, x-ray results were possibly concerning for osteomyelitis with subcutaneous emphysema noted.  MRI was therefore obtained, however without any acute abnormality effectively ruling out osteomyelitis.  DP and PT pulses mildly weak, therefore CLEMENTINA with reflex arterial ultrasound ordered, right foot unremarkable with left foot demonstrating mildly reduced toe brachial index. left arterial ultrasound showed diffuse plaque and multiphasic flow throughout common femoral, superficial femoral, and popliteal arteries without hemodynamically significant stenosis.  Also of note, segmental occlusion of proximal posterior tibial and peroneal arteries were seen with reconstitution at the proximal-mid levels of both arteries.  No need for inpatient vascular invention, patient discharged in stable condition with offloading shoes as well as short course of Augmentin.  Also of note, A1c was obtained  and patient, at 7.5% therefore no adjustment to antihyperglycemic's were done.  Referrals for wound care clinic, podiatry, as well as hematology/oncology (for recurrent clots) placed.      Therefore, he is discharged in good and stable condition to home with close outpatient follow-up.    The patient recovered much more quickly than anticipated on admission.    Discharge Date  1/26/24    Physical Exam on Day of Discharge  Physical Exam  Constitutional:       General: He is not in acute distress.     Appearance: Normal appearance. He is not ill-appearing, toxic-appearing or diaphoretic.   HENT:      Head: Normocephalic and atraumatic.      Right Ear: External ear normal.      Left Ear: External ear normal.      Nose: Nose normal. No rhinorrhea.      Mouth/Throat:      Mouth: Mucous membranes are moist.      Pharynx: Oropharynx is clear.   Eyes:      General: No scleral icterus.        Right eye: No discharge.         Left eye: No discharge.      Extraocular Movements: Extraocular movements intact.   Cardiovascular:      Rate and Rhythm: Normal rate and regular rhythm.      Pulses: Normal pulses.      Heart sounds: No murmur heard.  Pulmonary:      Effort: Pulmonary effort is normal. No respiratory distress.      Breath sounds: Normal breath sounds. No wheezing or rhonchi.   Abdominal:      General: There is no distension.      Palpations: Abdomen is soft.      Tenderness: There is no abdominal tenderness. There is no guarding or rebound.   Musculoskeletal:         General: Deformity (of left second digit) present.      Cervical back: Normal range of motion and neck supple.      Comments: Outgrowth noted on left second digit with callus/nail noted on distal aspect. Mild erythema without drainage, no foul smell   Skin:     General: Skin is warm and dry.   Neurological:      General: No focal deficit present.      Mental Status: He is alert and oriented to person, place, and time. Mental status is at baseline.    Psychiatric:         Mood and Affect: Mood normal.         Behavior: Behavior normal.         Thought Content: Thought content normal.         Judgment: Judgment normal.     FOLLOW UP ITEMS POST DISCHARGE  -Follow up with PCP  -Referrals placed for podiatry, wound care, and hematology (given hx of multiple clots)  -Advised to take medications as prescribed including abx    DISCHARGE DIAGNOSES  Principal Problem:    Osteomyelitis of toe of left foot (HCC) (POA: Yes)  Active Problems:    H/O pulmonary embolism (POA: Yes)    Type 2 diabetes mellitus (HCC) (POA: Yes)    H/O CVA (cerebrovascular accident) (POA: Yes)  Resolved Problems:    * No resolved hospital problems. *      FOLLOW UP  No future appointments.  No follow-up provider specified.    MEDICATIONS ON DISCHARGE     Medication List        START taking these medications        Instructions   acetaminophen 325 MG Tabs  Commonly known as: Tylenol   Take 2 Tablets by mouth every 6 hours as needed for Mild Pain.  Dose: 650 mg     amoxicillin-clavulanate 875-125 MG Tabs  Commonly known as: Augmentin   Take 1 Tablet by mouth every 12 hours for 5 days.  Dose: 1 Tablet            CONTINUE taking these medications        Instructions   Eliquis 5mg Tabs  Generic drug: apixaban   Take 5 mg by mouth 2 Times a Day.  Dose: 5 mg     metformin 1000 MG tablet  Commonly known as: Glucophage   Take 1 Tab by mouth 2 times a day, with meals.  Dose: 1,000 mg              Allergies  No Known Allergies    DIET  Orders Placed This Encounter   Procedures    Diet Order Diet: Consistent CHO (Diabetic)     Standing Status:   Standing     Number of Occurrences:   1     Order Specific Question:   Diet:     Answer:   Consistent CHO (Diabetic) [4]       ACTIVITY  As tolerated.  Weight bearing as tolerated    CONSULTATIONS  None    PROCEDURES  None    LABORATORY  Lab Results   Component Value Date    SODIUM 141 01/26/2024    POTASSIUM 4.0 01/26/2024    CHLORIDE 109 01/26/2024    CO2 24  01/26/2024    GLUCOSE 124 (H) 01/26/2024    BUN 11 01/26/2024    CREATININE 0.64 01/26/2024        Lab Results   Component Value Date    WBC 6.7 01/26/2024    HEMOGLOBIN 13.7 (L) 01/26/2024    HEMATOCRIT 40.4 (L) 01/26/2024    PLATELETCT 275 01/26/2024        Total time of the discharge process exceeds 45 minutes.

## 2024-01-26 NOTE — ED TRIAGE NOTES
Pt amb to triage w/ wife.  Chief Complaint   Patient presents with    Digit Pain     Left foot 2nd toe      Hx of DMII w/ previous amputations. Pt concerned about a progressing infection.

## 2024-01-26 NOTE — ASSESSMENT & PLAN NOTE
Patient reports history of DVT/PE, previously stopped anticoagulation with recurrence of clots. Denies family history of clotting disorder. Chart review shows h/o celiac artery dissection with associated thrombosis (2014), was supposed to see hematology but unsure if this is done. Not sure if hypercoagulable workup was done.  On Eliquis 5mg twice daily, last dose morning of 1/25/24  -Continue home Eliquis for now, pending MRI

## 2024-01-26 NOTE — ASSESSMENT & PLAN NOTE
Patient reports history of DVT/PE, previously stopped anticoagulation with recurrence of clots. Denies family history of clotting disorder. Chart review shows h/o celiac artery dissection with associated thrombosis (2014), was supposed to see hematology but unsure if this is done. Not sure if hypercoagulable workup was done.  On Eliquis 5mg twice daily, last dose morning of 1/25/24  -Hold Eliquis for now, for possible procedure

## 2024-01-26 NOTE — DISCHARGE INSTRUCTIONS
-Please take medications as prescribed, including your antibiotic course  -Please follow up with your primary care physician, referral was also placed for podiatry  -A referral for a hematologist was also placed given your history of repeated clots  -We have provided you offloading shoes to help alleviate pressure off your feet  -We have placed a referral for wound care clinic as well

## 2024-01-26 NOTE — ASSESSMENT & PLAN NOTE
Wound to distal aspect of second digit, with notable deformity  Xray concerning for possible osteomyelitis  S/p vancomycin and Unasyn in ED  -Admit to medicine  -MRI with contrast  -Wound care and limb preservation consult placed  -PT/OT  -Fall precautions

## 2024-01-28 ENCOUNTER — TELEPHONE (OUTPATIENT)
Dept: HOSPITALIST | Facility: MEDICAL CENTER | Age: 64
End: 2024-01-28
Payer: MEDICARE

## 2024-01-28 DIAGNOSIS — I73.9 PERIPHERAL VASCULAR DISEASE (HCC): ICD-10-CM

## 2024-01-30 LAB
BACTERIA BLD CULT: NORMAL
BACTERIA BLD CULT: NORMAL
SIGNIFICANT IND 70042: NORMAL
SIGNIFICANT IND 70042: NORMAL
SITE SITE: NORMAL
SITE SITE: NORMAL
SOURCE SOURCE: NORMAL
SOURCE SOURCE: NORMAL

## 2024-03-03 SDOH — ECONOMIC STABILITY: INCOME INSECURITY: IN THE LAST 12 MONTHS, WAS THERE A TIME WHEN YOU WERE NOT ABLE TO PAY THE MORTGAGE OR RENT ON TIME?: NO

## 2024-03-03 SDOH — ECONOMIC STABILITY: INCOME INSECURITY: HOW HARD IS IT FOR YOU TO PAY FOR THE VERY BASICS LIKE FOOD, HOUSING, MEDICAL CARE, AND HEATING?: NOT VERY HARD

## 2024-03-03 SDOH — HEALTH STABILITY: MENTAL HEALTH
STRESS IS WHEN SOMEONE FEELS TENSE, NERVOUS, ANXIOUS, OR CAN'T SLEEP AT NIGHT BECAUSE THEIR MIND IS TROUBLED. HOW STRESSED ARE YOU?: ONLY A LITTLE

## 2024-03-03 SDOH — ECONOMIC STABILITY: FOOD INSECURITY: WITHIN THE PAST 12 MONTHS, YOU WORRIED THAT YOUR FOOD WOULD RUN OUT BEFORE YOU GOT MONEY TO BUY MORE.: NEVER TRUE

## 2024-03-03 SDOH — ECONOMIC STABILITY: FOOD INSECURITY: WITHIN THE PAST 12 MONTHS, THE FOOD YOU BOUGHT JUST DIDN'T LAST AND YOU DIDN'T HAVE MONEY TO GET MORE.: NEVER TRUE

## 2024-03-03 SDOH — ECONOMIC STABILITY: TRANSPORTATION INSECURITY
IN THE PAST 12 MONTHS, HAS THE LACK OF TRANSPORTATION KEPT YOU FROM MEDICAL APPOINTMENTS OR FROM GETTING MEDICATIONS?: NO

## 2024-03-03 SDOH — ECONOMIC STABILITY: HOUSING INSECURITY
IN THE LAST 12 MONTHS, WAS THERE A TIME WHEN YOU DID NOT HAVE A STEADY PLACE TO SLEEP OR SLEPT IN A SHELTER (INCLUDING NOW)?: NO

## 2024-03-03 SDOH — HEALTH STABILITY: PHYSICAL HEALTH: ON AVERAGE, HOW MANY MINUTES DO YOU ENGAGE IN EXERCISE AT THIS LEVEL?: 10 MIN

## 2024-03-03 SDOH — ECONOMIC STABILITY: HOUSING INSECURITY: IN THE LAST 12 MONTHS, HOW MANY PLACES HAVE YOU LIVED?: 1

## 2024-03-03 SDOH — HEALTH STABILITY: PHYSICAL HEALTH: ON AVERAGE, HOW MANY DAYS PER WEEK DO YOU ENGAGE IN MODERATE TO STRENUOUS EXERCISE (LIKE A BRISK WALK)?: 1 DAY

## 2024-03-03 SDOH — ECONOMIC STABILITY: TRANSPORTATION INSECURITY
IN THE PAST 12 MONTHS, HAS LACK OF TRANSPORTATION KEPT YOU FROM MEETINGS, WORK, OR FROM GETTING THINGS NEEDED FOR DAILY LIVING?: NO

## 2024-03-03 SDOH — ECONOMIC STABILITY: TRANSPORTATION INSECURITY
IN THE PAST 12 MONTHS, HAS LACK OF RELIABLE TRANSPORTATION KEPT YOU FROM MEDICAL APPOINTMENTS, MEETINGS, WORK OR FROM GETTING THINGS NEEDED FOR DAILY LIVING?: NO

## 2024-03-03 ASSESSMENT — LIFESTYLE VARIABLES
SKIP TO QUESTIONS 9-10: 1
HOW OFTEN DO YOU HAVE SIX OR MORE DRINKS ON ONE OCCASION: NEVER
HOW OFTEN DO YOU HAVE A DRINK CONTAINING ALCOHOL: 2-4 TIMES A MONTH
HOW MANY STANDARD DRINKS CONTAINING ALCOHOL DO YOU HAVE ON A TYPICAL DAY: 1 OR 2
AUDIT-C TOTAL SCORE: 2

## 2024-03-03 ASSESSMENT — SOCIAL DETERMINANTS OF HEALTH (SDOH)
WITHIN THE PAST 12 MONTHS, YOU WORRIED THAT YOUR FOOD WOULD RUN OUT BEFORE YOU GOT THE MONEY TO BUY MORE: NEVER TRUE
HOW OFTEN DO YOU HAVE SIX OR MORE DRINKS ON ONE OCCASION: NEVER
DO YOU BELONG TO ANY CLUBS OR ORGANIZATIONS SUCH AS CHURCH GROUPS UNIONS, FRATERNAL OR ATHLETIC GROUPS, OR SCHOOL GROUPS?: YES
IN A TYPICAL WEEK, HOW MANY TIMES DO YOU TALK ON THE PHONE WITH FAMILY, FRIENDS, OR NEIGHBORS?: MORE THAN THREE TIMES A WEEK
HOW OFTEN DO YOU ATTEND CHURCH OR RELIGIOUS SERVICES?: MORE THAN 4 TIMES PER YEAR
IN A TYPICAL WEEK, HOW MANY TIMES DO YOU TALK ON THE PHONE WITH FAMILY, FRIENDS, OR NEIGHBORS?: MORE THAN THREE TIMES A WEEK
DO YOU BELONG TO ANY CLUBS OR ORGANIZATIONS SUCH AS CHURCH GROUPS UNIONS, FRATERNAL OR ATHLETIC GROUPS, OR SCHOOL GROUPS?: YES
HOW HARD IS IT FOR YOU TO PAY FOR THE VERY BASICS LIKE FOOD, HOUSING, MEDICAL CARE, AND HEATING?: NOT VERY HARD
HOW OFTEN DO YOU HAVE A DRINK CONTAINING ALCOHOL: 2-4 TIMES A MONTH
HOW OFTEN DO YOU ATTEND CHURCH OR RELIGIOUS SERVICES?: MORE THAN 4 TIMES PER YEAR
HOW OFTEN DO YOU ATTENT MEETINGS OF THE CLUB OR ORGANIZATION YOU BELONG TO?: MORE THAN 4 TIMES PER YEAR
HOW OFTEN DO YOU GET TOGETHER WITH FRIENDS OR RELATIVES?: ONCE A WEEK
HOW MANY DRINKS CONTAINING ALCOHOL DO YOU HAVE ON A TYPICAL DAY WHEN YOU ARE DRINKING: 1 OR 2
HOW OFTEN DO YOU ATTENT MEETINGS OF THE CLUB OR ORGANIZATION YOU BELONG TO?: MORE THAN 4 TIMES PER YEAR
HOW OFTEN DO YOU GET TOGETHER WITH FRIENDS OR RELATIVES?: ONCE A WEEK

## 2024-03-06 ENCOUNTER — OFFICE VISIT (OUTPATIENT)
Dept: MEDICAL GROUP | Age: 64
End: 2024-03-06
Payer: MEDICARE

## 2024-03-06 VITALS
SYSTOLIC BLOOD PRESSURE: 120 MMHG | BODY MASS INDEX: 28.35 KG/M2 | DIASTOLIC BLOOD PRESSURE: 60 MMHG | OXYGEN SATURATION: 96 % | HEART RATE: 89 BPM | TEMPERATURE: 98.5 F | WEIGHT: 198 LBS | HEIGHT: 70 IN

## 2024-03-06 DIAGNOSIS — E78.2 MIXED DYSLIPIDEMIA: ICD-10-CM

## 2024-03-06 DIAGNOSIS — E11.69 TYPE 2 DIABETES MELLITUS WITH OTHER SPECIFIED COMPLICATION, WITHOUT LONG-TERM CURRENT USE OF INSULIN (HCC): ICD-10-CM

## 2024-03-06 DIAGNOSIS — Z86.711 HISTORY OF PULMONARY EMBOLISM: ICD-10-CM

## 2024-03-06 DIAGNOSIS — Z12.11 COLON CANCER SCREENING: ICD-10-CM

## 2024-03-06 DIAGNOSIS — Z86.73 H/O: CVA (CEREBROVASCULAR ACCIDENT): ICD-10-CM

## 2024-03-06 DIAGNOSIS — B35.1 ONYCHOMYCOSIS: ICD-10-CM

## 2024-03-06 PROCEDURE — 99204 OFFICE O/P NEW MOD 45 MIN: CPT | Performed by: STUDENT IN AN ORGANIZED HEALTH CARE EDUCATION/TRAINING PROGRAM

## 2024-03-06 PROCEDURE — 3078F DIAST BP <80 MM HG: CPT | Performed by: STUDENT IN AN ORGANIZED HEALTH CARE EDUCATION/TRAINING PROGRAM

## 2024-03-06 PROCEDURE — 92250 FUNDUS PHOTOGRAPHY W/I&R: CPT | Mod: TC | Performed by: STUDENT IN AN ORGANIZED HEALTH CARE EDUCATION/TRAINING PROGRAM

## 2024-03-06 PROCEDURE — 3074F SYST BP LT 130 MM HG: CPT | Performed by: STUDENT IN AN ORGANIZED HEALTH CARE EDUCATION/TRAINING PROGRAM

## 2024-03-06 ASSESSMENT — ENCOUNTER SYMPTOMS
BRUISES/BLEEDS EASILY: 0
WEAKNESS: 0
BLURRED VISION: 0
FEVER: 0
DIZZINESS: 0
SENSORY CHANGE: 1
WHEEZING: 0
HEARTBURN: 0
DOUBLE VISION: 0
DEPRESSION: 0
BLOOD IN STOOL: 0
HEADACHES: 0
CHILLS: 0
PALPITATIONS: 0
SHORTNESS OF BREATH: 0
COUGH: 0
BACK PAIN: 0

## 2024-03-06 ASSESSMENT — FIBROSIS 4 INDEX: FIB4 SCORE: 1.17

## 2024-03-06 NOTE — PROGRESS NOTES
Subjective:     CC: Establish care, new patient   Diagnoses of Type 2 diabetes mellitus with other specified complication, without long-term current use of insulin ([facility]) and Colon cancer screening were pertinent to this visit.    HISTORY OF THE PRESENT ILLNESS: Patient is a 63 y.o. male. This pleasant patient is here today to establish care and discuss chronic medical conditions, immunizations, appropriate screening.  She has a past medical history relevant for CVA, PE,  DVT, mixed dyslipidemia, and diabetes mellitus with peripheral nerve damage and amputation of third left toe.  Patient was seen recently in the emergency department at [facility] for concerns of osteomyelitis.  ESR CRP were normal and also MRI.  Patient is currently taking apixaban, and metformin 1 g twice a day.  He is not taking statin, apparently it was recommended by previous  PCP he declined.  Today patient states feeling well, he is compliant with his medications and does not have any complaints.  Recent A1c last month it was 7.5%.  Patient declined overdue immunizations such as influenza, pneumonia, Tdap.    Health Maintenance: Completed    ROS:   Review of Systems   Constitutional:  Negative for chills, fever and malaise/fatigue.   HENT:  Negative for nosebleeds and tinnitus.    Eyes:  Negative for blurred vision and double vision.   Respiratory:  Negative for cough, shortness of breath and wheezing.    Cardiovascular:  Negative for chest pain, palpitations and leg swelling.   Gastrointestinal:  Negative for blood in stool, heartburn and melena.   Genitourinary:  Negative for dysuria and urgency.   Musculoskeletal:  Negative for back pain and joint pain.   Skin:  Negative for rash.   Neurological:  Positive for sensory change. Negative for dizziness, weakness and headaches.   Endo/Heme/Allergies:  Does not bruise/bleed easily.   Psychiatric/Behavioral:  Negative for depression and suicidal ideas.          Objective:       Exam: BP  "120/60 (BP Location: Right arm, Patient Position: Sitting, BP Cuff Size: Large adult)   Pulse 89   Temp 36.9 °C (98.5 °F) (Temporal)   Ht 1.778 m (5' 10\")   Wt 89.8 kg (198 lb)   SpO2 96%  Body mass index is 28.41 kg/m².    Physical Exam  Vitals reviewed.   Constitutional:       General: He is not in acute distress.     Appearance: He is not ill-appearing.   HENT:      Head: Normocephalic and atraumatic.      Right Ear: Tympanic membrane and ear canal normal.      Left Ear: Tympanic membrane and ear canal normal.      Nose: No congestion.      Mouth/Throat:      Mouth: Mucous membranes are moist.      Pharynx: No oropharyngeal exudate or posterior oropharyngeal erythema.   Eyes:      General: No scleral icterus.     Extraocular Movements: Extraocular movements intact.      Pupils: Pupils are equal, round, and reactive to light.   Cardiovascular:      Rate and Rhythm: Normal rate and regular rhythm.      Pulses: Normal pulses.      Heart sounds: Normal heart sounds. No murmur heard.  Pulmonary:      Effort: Pulmonary effort is normal. No respiratory distress.      Breath sounds: Normal breath sounds. No wheezing.   Abdominal:      General: Bowel sounds are normal. There is no distension.      Palpations: Abdomen is soft.      Tenderness: There is no abdominal tenderness. There is no guarding or rebound.   Musculoskeletal:         General: Deformity present.      Cervical back: Normal range of motion and neck supple. No rigidity.      Right lower leg: No edema.      Left lower leg: No edema.      Comments: Left third toe amputated, calluses in left small toe.  Onychomycosis movement in all toenails.  Monofilament testing with a 10 gram force: sensation intact: decreased bilaterally  Visual Inspection: Feet without maceration, ulcers, fissures.  Pedal pulses: intact bilaterally    Skin:     General: Skin is warm.      Capillary Refill: Capillary refill takes less than 2 seconds.      Findings: No bruising or " erythema.   Neurological:      General: No focal deficit present.      Mental Status: He is alert.      Cranial Nerves: No cranial nerve deficit.      Sensory: No sensory deficit.   Psychiatric:         Mood and Affect: Mood normal.         Behavior: Behavior normal.       Labs: Reviewed    Assessment & Plan:   63 y.o. male with the following -    1. Type 2 diabetes mellitus with other specified complication, without long-term current use of insulin (HCC)  - Chronic, stable  - A1c: 7.5% last month   - With complications, charcot foot,neuropathy  - Not on statin   - Declined therapy   - Continue metformin 1 gr BID  - POCT exam today  - Mixroalb/creat ratio ordered and CMP and lipid profile     - Monofilament testing with a 10 gram force: sensation intact: decreased bilaterally  Visual Inspection: Feet without maceration, ulcers, fissures.  Pedal pulses: intact bilaterally     - POCT Retinal Eye Exam  - Diabetic Monofilament LE Exam  - Hemoglobin A1c; Future  - Lipid Profile; Future  - Microalbumin Creat Ratio Urine - Lab Collect; Future  - VITAMIN B12; Future    2. H/O CVA (cerebrovascular accident)  - On anticoagulations     3. H/O pulmonary embolism  - In 2019  - Prior DVT  - On Anticoagulation     4. Onychomycosis  - Chronic, severe  - Recommended treatment due to Hx of diabetes and complications such as amputation and charcot foot  - Patient will think about it  - His podiatrist also recommended treatment in the past    5. Mixed dyslipidemia  - Chronic, no taking statin  - Patient refused therapy offered by his previous PCP  - I recommended therapy   - Patient would like labs first and will decide     6. Colon cancer screening  - Due for screening   - Referral to GI for Colonoscopy       Return in about 3 months (around 6/6/2024) for Labs, DM, Hyperchol.    Please note that this dictation was created using voice recognition software. I have made every reasonable attempt to correct obvious errors, but I expect  that there are errors of grammar and possibly content that I did not discover before finalizing the note.

## 2024-03-12 LAB — RETINAL SCREEN: NEGATIVE

## 2024-04-01 ENCOUNTER — PATIENT MESSAGE (OUTPATIENT)
Dept: MEDICAL GROUP | Age: 64
End: 2024-04-01
Payer: MEDICARE

## 2024-04-01 DIAGNOSIS — E11.69 TYPE 2 DIABETES MELLITUS WITH OTHER SPECIFIED COMPLICATION, WITHOUT LONG-TERM CURRENT USE OF INSULIN (HCC): ICD-10-CM

## 2024-05-03 NOTE — TELEPHONE ENCOUNTER
VOICEMAIL  1. Caller Name:   Ryan Small                        Call Back Number: [phone]    2. Message: Needs a refill on Eliquis, send to [facility] in [facility]    3. Patient approves office to leave a detailed voicemail/MyChart message: N\A

## 2024-06-05 ENCOUNTER — HOSPITAL ENCOUNTER (OUTPATIENT)
Dept: LAB | Facility: MEDICAL CENTER | Age: 64
End: 2024-06-05
Attending: STUDENT IN AN ORGANIZED HEALTH CARE EDUCATION/TRAINING PROGRAM
Payer: MEDICARE

## 2024-06-05 DIAGNOSIS — E11.69 TYPE 2 DIABETES MELLITUS WITH OTHER SPECIFIED COMPLICATION, WITHOUT LONG-TERM CURRENT USE OF INSULIN (HCC): ICD-10-CM

## 2024-06-05 LAB
CHOLEST SERPL-MCNC: 236 MG/DL (ref 100–199)
CREAT UR-MCNC: 16.49 MG/DL
EST. AVERAGE GLUCOSE BLD GHB EST-MCNC: 174 MG/DL
HBA1C MFR BLD: 7.7 % (ref 4–5.6)
HDLC SERPL-MCNC: 43 MG/DL
LDLC SERPL CALC-MCNC: 170 MG/DL
MICROALBUMIN UR-MCNC: <1.2 MG/DL
MICROALBUMIN/CREAT UR: NORMAL MG/G (ref 0–30)
TRIGL SERPL-MCNC: 113 MG/DL (ref 0–149)

## 2024-06-05 PROCEDURE — 83036 HEMOGLOBIN GLYCOSYLATED A1C: CPT | Mod: GA

## 2024-06-05 PROCEDURE — 80061 LIPID PANEL: CPT

## 2024-06-05 PROCEDURE — 82570 ASSAY OF URINE CREATININE: CPT

## 2024-06-05 PROCEDURE — 82607 VITAMIN B-12: CPT

## 2024-06-05 PROCEDURE — 36415 COLL VENOUS BLD VENIPUNCTURE: CPT

## 2024-06-05 PROCEDURE — 82043 UR ALBUMIN QUANTITATIVE: CPT

## 2024-06-06 ENCOUNTER — OFFICE VISIT (OUTPATIENT)
Dept: MEDICAL GROUP | Age: 64
End: 2024-06-06
Payer: MEDICARE

## 2024-06-06 VITALS
OXYGEN SATURATION: 100 % | WEIGHT: 200 LBS | DIASTOLIC BLOOD PRESSURE: 62 MMHG | SYSTOLIC BLOOD PRESSURE: 120 MMHG | BODY MASS INDEX: 28.63 KG/M2 | HEART RATE: 68 BPM | TEMPERATURE: 98 F | HEIGHT: 70 IN

## 2024-06-06 DIAGNOSIS — Z86.73 H/O: CVA (CEREBROVASCULAR ACCIDENT): ICD-10-CM

## 2024-06-06 DIAGNOSIS — E11.9 TYPE 2 DIABETES MELLITUS WITHOUT COMPLICATION, WITHOUT LONG-TERM CURRENT USE OF INSULIN (HCC): ICD-10-CM

## 2024-06-06 DIAGNOSIS — E78.2 MIXED DYSLIPIDEMIA: ICD-10-CM

## 2024-06-06 DIAGNOSIS — I50.20 NYHA CLASS 1 HEART FAILURE WITH REDUCED EJECTION FRACTION (HCC): ICD-10-CM

## 2024-06-06 LAB — VIT B12 SERPL-MCNC: 338 PG/ML (ref 211–911)

## 2024-06-06 PROCEDURE — 3078F DIAST BP <80 MM HG: CPT | Performed by: STUDENT IN AN ORGANIZED HEALTH CARE EDUCATION/TRAINING PROGRAM

## 2024-06-06 PROCEDURE — 99214 OFFICE O/P EST MOD 30 MIN: CPT | Performed by: STUDENT IN AN ORGANIZED HEALTH CARE EDUCATION/TRAINING PROGRAM

## 2024-06-06 PROCEDURE — 3074F SYST BP LT 130 MM HG: CPT | Performed by: STUDENT IN AN ORGANIZED HEALTH CARE EDUCATION/TRAINING PROGRAM

## 2024-06-06 ASSESSMENT — ENCOUNTER SYMPTOMS
BRUISES/BLEEDS EASILY: 0
ORTHOPNEA: 0
DOUBLE VISION: 0
CHILLS: 0
DIZZINESS: 0
PALPITATIONS: 0
HEARTBURN: 0
FEVER: 0
WEAKNESS: 0
COUGH: 0
ABDOMINAL PAIN: 0
BLOOD IN STOOL: 0
HEADACHES: 0
BLURRED VISION: 0
BACK PAIN: 0
WHEEZING: 0
SHORTNESS OF BREATH: 0
DEPRESSION: 0

## 2024-06-06 ASSESSMENT — FIBROSIS 4 INDEX: FIB4 SCORE: 1.17

## 2024-06-06 NOTE — PROGRESS NOTES
Subjective:     CC: Lab review    HPI:   History of Present Illness  The patient presents for lab review and evaluation of multiple medical concerns. He is accompanied by his wife.    The patient has reviewed his laboratory results and perceives them to be satisfactory. However, he acknowledges a slight elevation in his A1c levels. Apart from this, he reports feeling well.    The patient is not currently on any cholesterol-lowering medication. He acknowledges a lack of regular exercise following his improved cholesterol levels, attributing this to his busy schedule post-assisted. He has made dietary modifications, including the consumption of restaurant food, and plans to eliminate this from his diet. He anticipates an improvement in his cholesterol levels at his next appointment without the need for medication. He plans to resume gym workouts.    The accompanying adult female reports that the patient has been under the care of a gastroenterologist at [facility], who believes the patient has a cardiac condition and requires clearance from a cardiologist prior to scheduling a colonoscopy. The patient had a colonoscopy scheduled in 2 weeks, which was subsequently cancelled. The accompanying adult female confirms that the patient has discontinued his Eliquis medication. The gastroenterologist's notes from the early 2000s indicate a diagnosis of congestive heart failure. The patient has not consulted a cardiologist for an extended period, with the last consultation occurring in 2019 following the assisted of his previous cardiologist, Dr. Gonzalez.     Current A1c is 7.7%, lipid profile showed elevated total cholesterol 236, significant elevated , and normal triglycerides and HDL.  Patient has not been on optimal medical therapy for several years.  His only medications currently are Eliquis and metformin.  Patient should be on statin and heart failure medications.  However he has not had an echo since 2019.  He  "also never returned back to see a cardiologist.  I suggested to Mr. Small that we should obtain another echo and he also needs to reestablish care with cardiologist to restart heart failure medications.    ROS:  Review of Systems   Constitutional:  Negative for chills, fever and malaise/fatigue.   HENT:  Negative for nosebleeds and tinnitus.    Eyes:  Negative for blurred vision and double vision.   Respiratory:  Negative for cough, shortness of breath and wheezing.    Cardiovascular:  Negative for chest pain, palpitations, orthopnea and leg swelling.   Gastrointestinal:  Negative for abdominal pain, blood in stool, heartburn and melena.   Genitourinary:  Negative for dysuria and urgency.   Musculoskeletal:  Negative for back pain and joint pain.   Skin:  Negative for rash.   Neurological:  Negative for dizziness, weakness and headaches.   Endo/Heme/Allergies:  Does not bruise/bleed easily.   Psychiatric/Behavioral:  Negative for depression and suicidal ideas.        Objective:     Exam:  /62 (BP Location: Left arm, Patient Position: Sitting, BP Cuff Size: Adult)   Pulse 68   Temp 36.7 °C (98 °F) (Temporal)   Ht 1.778 m (5' 10\")   Wt 90.7 kg (200 lb)   SpO2 100%   BMI 28.70 kg/m²  Body mass index is 28.7 kg/m².    Physical Exam  Vitals reviewed.   Constitutional:       General: He is not in acute distress.  HENT:      Head: Normocephalic and atraumatic.      Nose: No congestion.      Mouth/Throat:      Mouth: Mucous membranes are moist.      Pharynx: No oropharyngeal exudate or posterior oropharyngeal erythema.   Eyes:      General: No scleral icterus.     Extraocular Movements: Extraocular movements intact.      Pupils: Pupils are equal, round, and reactive to light.   Cardiovascular:      Rate and Rhythm: Normal rate and regular rhythm.      Pulses: Normal pulses.      Heart sounds: Normal heart sounds. No murmur heard.  Pulmonary:      Effort: Pulmonary effort is normal. No respiratory distress.      " Breath sounds: Normal breath sounds. No wheezing.   Abdominal:      General: Bowel sounds are normal. There is no distension.      Palpations: Abdomen is soft.      Tenderness: There is no abdominal tenderness. There is no guarding or rebound.   Musculoskeletal:      Cervical back: Normal range of motion and neck supple.      Right lower leg: No edema.      Left lower leg: No edema.   Lymphadenopathy:      Cervical: No cervical adenopathy.   Skin:     General: Skin is warm.      Capillary Refill: Capillary refill takes less than 2 seconds.      Findings: No bruising or erythema.   Neurological:      General: No focal deficit present.      Mental Status: He is alert.      Cranial Nerves: No cranial nerve deficit.      Sensory: No sensory deficit.   Psychiatric:         Mood and Affect: Mood normal.         Behavior: Behavior normal.       Labs: Reviewed    Assessment & Plan:     63 y.o. male with the following -     1. NYHA class 1 heart failure with reduced ejection fraction (HCC)  -Chronic, symptomatically stable  -However patient has not seen a cardiologist for the past 5 years  -Patient also is not taking any medications for heart failure  -In the past he used to see Dr. Cuadra, but he retired and he never reestablish care with another cardiologist.  -Patient is new to me and I recommended him to obtain another echo. Reestablishing care with cardiologist for proper assessment and reinitiation of heart failure therapy  -Patient otherwise states that he feels fine and he is not concerned about taking more medication that he is on or running.  -He stated that he would prefer not to take more drugs, however he is open to see another cardiologist    - EC-ECHOCARDIOGRAM COMPLETE W/O CONT; Future  - REFERRAL TO CARDIOLOGY    2. Type 2 diabetes mellitus without complication, without long-term current use of insulin (HCC)  -Chronic, stable  -A1c is 7.7%, slightly worse than 4 months ago  -He is currently taking metformin  at 1000 mg twice daily  -Patient is not on statin.  He has declined therapy    - HEMOGLOBIN A1C; Future    3. Mixed dyslipidemia  -Chronic, not treated  -Currently profile showed elevated total cholesterol and significantly elevated   -Patient has a high ASCVD score, however he declines therapy  -Today I encourage Mr. Davis to consider therapy given multiple high risk factors such as diabetes, heart disease, history of CVA.    - Lipid Profile; Future    4. H/O CVA (cerebrovascular accident)  -No sequela  -Patient has been on apixaban 5 mg twice daily  -However he is not taking statin  -Patient has refused therapy with statin  -Today again I recommended him to start therapy with statin, but again he refused  -His LDL is 170, he has history of diabetes and heart failure  -He is at very high risk for adverse events and he is aware of it      Return in about 4 months (around 10/6/2024) for Labs, DM, Hyperchol.    Please note that this dictation was created using voice recognition software. I have made every reasonable attempt to correct obvious errors, but I expect that there are errors of grammar and possibly content that I did not discover before finalizing the note.

## 2024-06-06 NOTE — PATIENT INSTRUCTIONS
- Continue home meds  - Think about starting again Statin therapy  - Schedule ECHO  - Follow up on Referral to cardiology  - Schedule aye exam   - Follow up with me in 4 months   - Repeat labs prior next visit with me

## 2024-06-12 ENCOUNTER — TELEPHONE (OUTPATIENT)
Dept: MEDICAL GROUP | Age: 64
End: 2024-06-12
Payer: MEDICARE

## 2024-06-12 DIAGNOSIS — I50.20 NYHA CLASS 1 HEART FAILURE WITH REDUCED EJECTION FRACTION (HCC): ICD-10-CM

## 2024-06-12 NOTE — TELEPHONE ENCOUNTER
Caller Name: Kimberly Small (spouse of Ryan)  Call Back Number: [phone]    How would the patient prefer to be contacted with a response: Phone call OK to leave a detailed message    Kimberly called regarding referral to cardiologist. Ryan currently has a placed referral by  to [facility].    Kimberly on behalf of Ryan is requesting a new referral to Cardiology with Dr Samson Shearer MD instead of previous placed referral.

## 2024-06-26 ENCOUNTER — TELEPHONE (OUTPATIENT)
Dept: HEALTH INFORMATION MANAGEMENT | Facility: OTHER | Age: 64
End: 2024-06-26
Payer: MEDICARE

## 2024-07-02 RX ORDER — APIXABAN 5 MG/1
5 TABLET, FILM COATED ORAL 2 TIMES DAILY
Qty: 60 TABLET | Refills: 0 | Status: SHIPPED | OUTPATIENT
Start: 2024-07-02

## 2024-08-06 RX ORDER — APIXABAN 5 MG/1
5 TABLET, FILM COATED ORAL 2 TIMES DAILY
Qty: 60 TABLET | Refills: 0 | Status: SHIPPED | OUTPATIENT
Start: 2024-08-06

## 2024-09-04 RX ORDER — APIXABAN 5 MG/1
5 TABLET, FILM COATED ORAL 2 TIMES DAILY
Qty: 60 TABLET | Refills: 0 | Status: SHIPPED | OUTPATIENT
Start: 2024-09-04

## 2024-10-02 RX ORDER — APIXABAN 5 MG/1
5 TABLET, FILM COATED ORAL 2 TIMES DAILY
Qty: 60 TABLET | Refills: 0 | Status: SHIPPED | OUTPATIENT
Start: 2024-10-02

## 2024-10-16 ENCOUNTER — APPOINTMENT (OUTPATIENT)
Dept: MEDICAL GROUP | Age: 64
End: 2024-10-16
Payer: MEDICARE

## 2024-10-23 ENCOUNTER — APPOINTMENT (OUTPATIENT)
Dept: MEDICAL GROUP | Age: 64
End: 2024-10-23
Payer: MEDICARE

## 2024-11-05 RX ORDER — APIXABAN 5 MG/1
5 TABLET, FILM COATED ORAL 2 TIMES DAILY
Qty: 60 TABLET | Refills: 0 | Status: SHIPPED | OUTPATIENT
Start: 2024-11-05

## 2024-11-18 DIAGNOSIS — E11.3311: ICD-10-CM

## 2025-01-06 RX ORDER — APIXABAN 5 MG/1
5 TABLET, FILM COATED ORAL 2 TIMES DAILY
Qty: 60 TABLET | Refills: 0 | Status: SHIPPED | OUTPATIENT
Start: 2025-01-06

## 2025-02-03 RX ORDER — APIXABAN 5 MG/1
5 TABLET, FILM COATED ORAL 2 TIMES DAILY
Qty: 60 TABLET | Refills: 0 | Status: SHIPPED | OUTPATIENT
Start: 2025-02-03

## 2025-02-28 NOTE — ED NOTES
Med Rec complete per patient   Allergies reviewed  Antibiotics in the past 30 days:no  Anticoagulant in past 14 days:yes  Anticoagulant:Eliquis 5 mg Last dose:01/25/2024  Pharmacy patient utilizes:[facility] in [place]        
PIV placed. Labs drawn and sent to lab. Pt attached to monitors.  
Pt comfort check. Given warm blanket updated on poc. vss  
Pt transported to floor with transporter. All belongings and chart transported with pt.   
Report off to blanquita reyes  
Report to floor RN.   
Xray at bedside.  
No.

## 2025-03-05 RX ORDER — APIXABAN 5 MG/1
5 TABLET, FILM COATED ORAL 2 TIMES DAILY
Qty: 60 TABLET | Refills: 0 | OUTPATIENT
Start: 2025-03-05

## (undated) DEVICE — CANISTER SUCTION RIGID RED 1500CC (40EA/CA)

## (undated) DEVICE — BASIN EMESIS DISP. - (250/CA)

## (undated) DEVICE — TUBE CONNECTING SUCTION - CLEAR PLASTIC STERILE 72 IN (50EA/CA)

## (undated) DEVICE — SPONGE GAUZE NON-STERILE 4X4 - (2000/CA 10PK/CA)

## (undated) DEVICE — DEVICE HEMOSTATIC CLIPPING RESOLUTION 360 DEGREES (20EA/BX)

## (undated) DEVICE — CAPTIVATOR II-25MM ROUND STIFF

## (undated) DEVICE — CATHERTER CLEAR SINGLE USE INJECTION THERAPY NEEDLE 25GA X 4MM  2.3MM X 240CM (5EA/BX)

## (undated) DEVICE — CANNULA W/ SUPPLY TUBING O2 - (50/CA)

## (undated) DEVICE — LACTATED RINGERS INJ 1000 ML - (14EA/CA 60CA/PF)

## (undated) DEVICE — GLOVE, LITE (PAIR)

## (undated) DEVICE — KIT  I.V. START (100EA/CA)

## (undated) DEVICE — SENSOR SPO2 ADULT LNCS ADTX (20/BX) ORDER ITEM #19593

## (undated) DEVICE — GOLD PROBE 7FR (5/BX)

## (undated) DEVICE — NEPTUNE 4 PORT MANIFOLD - (20/PK)

## (undated) DEVICE — PAD PREP 24 X 48 CUFFED - (100/CA)

## (undated) DEVICE — ELECTRODE 850 FOAM ADHESIVE - HYDROGEL RADIOTRNSPRNT (50/PK)

## (undated) DEVICE — GOWN SURGEONS X-LARGE - DISP. (30/CA)

## (undated) DEVICE — GOLD PROBE 10FR (5/BX)

## (undated) DEVICE — ELECTRODE DUAL RETURN W/ CORD - (50/PK)

## (undated) DEVICE — MASK ANESTHESIA ADULT  - (100/CA)

## (undated) DEVICE — TUBING CLEARLINK DUO-VENT - C-FLO (48EA/CA)

## (undated) DEVICE — CAPTIVATOR II-10MM ROUND STIFF

## (undated) DEVICE — SYRINGE DISP. 50CC LS - (40/BX)

## (undated) DEVICE — SPEEDBAND SUPERVIEW SUPER 7

## (undated) DEVICE — CAPTIVATOR II-15MM ROUND STIFF

## (undated) DEVICE — SET EXTENSION WITH 2 PORTS (48EA/CA) ***PART #2C8610 IS A SUBSTITUTE*****

## (undated) DEVICE — SNARECAPTIVATOR II - 33MM ROUND STIFF